# Patient Record
Sex: MALE | Race: WHITE | Employment: OTHER | URBAN - METROPOLITAN AREA
[De-identification: names, ages, dates, MRNs, and addresses within clinical notes are randomized per-mention and may not be internally consistent; named-entity substitution may affect disease eponyms.]

---

## 2017-03-30 ENCOUNTER — OFFICE VISIT (OUTPATIENT)
Dept: SLEEP MEDICINE | Age: 67
End: 2017-03-30

## 2017-03-30 VITALS
SYSTOLIC BLOOD PRESSURE: 147 MMHG | HEIGHT: 75 IN | WEIGHT: 221 LBS | BODY MASS INDEX: 27.48 KG/M2 | OXYGEN SATURATION: 97 % | HEART RATE: 56 BPM | DIASTOLIC BLOOD PRESSURE: 74 MMHG

## 2017-03-30 DIAGNOSIS — G47.33 OSA (OBSTRUCTIVE SLEEP APNEA): Primary | ICD-10-CM

## 2017-03-30 NOTE — PROGRESS NOTES
*Coby Guido is an 75 y.o. male who has been diagnosed as having obstructive sleep apnea. Initial Apnea/Hypopnea index was 13 which indicates mild apnea. He opted to have the disorder treated with an oral appliance. He was referred to Dr. Lenora Proctor for a mandibular advancement device which is stated as being used 100 percent of the time. He has failed CPAP and Bi-Level PAP in the past.    Polysomnogram was performed and the results of the study were explained to the patient. Please refer to interpretation report for further details. Apnea/Hypopnea index of 9 which indicates mild apnea. He has tried the slumber bump but does not use is as often, he has been advised to sleep on his back by his chiropractor. Recent oximetry indicative of desaturation >4 at 13 per hour. He has been advised by Dr. Justine Zamudio to get his deviated septum fixed. He denies severe snoring, snorting, periods of not breathing, tossing and turning or excessive daytime sleepiness. Coby Guido does not wake up frequently at night. He does not work shifts: Candance Huger Isidro Penna indicates he does not get too little sleep at night. Sleep quality has improved. Sleep duration has increased as has level of alertness during the day. [place] Sleepiness Score: 4 which reflects. Patient's usual sleep schedule is as follows:     Bedtime: 10 pm  Sleep Onset: 15-20 minutes  Frequency of nocturnal awakenings: 1-2 per night;   Duration: <5 minutes  Rise time: 6-7 am  Nappin    Sleep Review of Systems: notable for no difficulty falling asleep; infrequent awakenings at night;  regular dreaming noted; no nightmares ; no early morning headaches; no memory problems; no concentration issues; patient denies of being drowsy while driving.       Objective:     Visit Vitals    /74    Pulse (!) 56    Ht 6' 3\" (1.905 m)    Wt 221 lb (100.2 kg)    SpO2 97%    BMI 27.62 kg/m2         General:   Not in acute distress   Eyes: Anicteric sclerae, no obvious strabismus   Nose:  No Nasal septum deviation    Oropharynx:   Class 4 oropharyngeal outlet, thick tongue base, enlarged and boggy uvula, low-lying soft palate, narrow tonsilo-pharyngeal pilars   Tonsils:   tonsils are absent   Neck:    ; midline trachea   Chest/Lungs:  Equal lung expansion, clear on auscultation    CVS:  Normal rate, regular rhythm; no JVD   Skin:  Warm to touch; no obvious rashes   Neuro:  No focal deficits ; no obvious tremor    Psych:  Normal affect,  normal countenance;          Assessment:       ICD-10-CM ICD-9-CM    1. JOAN (obstructive sleep apnea) G47.33 327.23 SLEEP LAB (PAP TITRATION)         Plan:     . * He was provided information on sleep apnea including coresponding risk factors and the importance of proper treatment. * Treatment options if indicated were reviewed today. Patient agrees to a trial of PAP therapy if indicated. * Counseling was provided regarding proper sleep hygiene (including effect of light on sleep), paradoxical intention, stimulus control, sleep environment safety and safe driving. * Effect of sleep disturbance on weight was reviewed. We have recommended a dedicated weight loss through appropriate diet and an exercise regiment as significant weight reduction has been shown to reduce severity of obstructive sleep apnea. * Patient was asked to contact our office at any time for further questions regarding their sleep symptoms. Office visit exceeded 25 minutes with counseling and direction of care taking up more than 50% of the allotted time. Thank you for allowing to participate in your patient's medical care.

## 2018-04-13 ENCOUNTER — OFFICE VISIT (OUTPATIENT)
Dept: SLEEP MEDICINE | Age: 68
End: 2018-04-13

## 2018-04-13 VITALS
HEIGHT: 75 IN | HEART RATE: 57 BPM | BODY MASS INDEX: 26.36 KG/M2 | WEIGHT: 212 LBS | OXYGEN SATURATION: 97 % | SYSTOLIC BLOOD PRESSURE: 125 MMHG | DIASTOLIC BLOOD PRESSURE: 68 MMHG

## 2018-04-13 DIAGNOSIS — F39 MOOD DISORDER (HCC): ICD-10-CM

## 2018-04-13 DIAGNOSIS — E07.9 THYROID DISORDER: ICD-10-CM

## 2018-04-13 DIAGNOSIS — G47.33 OSA (OBSTRUCTIVE SLEEP APNEA): Primary | ICD-10-CM

## 2018-04-13 NOTE — PATIENT INSTRUCTIONS
[address] [place]. [place], [address]  Tel.  [phone]  Fax. [address] [facility] [address]  [place], [address] [facility]  Tel.  [phone]  Fax. [phone] [address] [facility] [address]  Tel.  [phone]  Fax. [phone]     Sleep Apnea: After Your Visit  Your Care Instructions  Sleep apnea occurs when you frequently stop breathing for 10 seconds or longer during sleep. It can be mild to severe, based on the number of times per hour that you stop breathing or have slowed breathing. Blocked or narrowed airways in your nose, mouth, or throat can cause sleep apnea. Your airway can become blocked when your throat muscles and tongue relax during sleep. Sleep apnea is common, occurring in 1 out of 20 individuals. Individuals having any of the following characteristics should be evaluated and treated right away due to high risk and detrimental consequences from untreated sleep apnea:  1. Obesity  2. Congestive Heart failure  3. Atrial Fibrillation  4. Uncontrolled Hypertension  5. Type II Diabetes  6. Night-time Arrhythmias  7. Stroke  8. Pulmonary Hypertension  9. High-risk Driving Populations (pilots, truck drivers, etc.)  10. Patients Considering Weight-loss Surgery    How do you know you have sleep apnea? You probably have sleep apnea if you answer 'yes' to 3 or more of the following questions:  S - Have you been told that you Snore? T - Are you often Tired during the day? O - Has anyone Observed you stop breathing while sleeping? P- Do you have (or are being treated for) high blood Pressure? B - Are you obese (Body Mass Index > 35)? A - Is your Age 48years old or older? N - Is your Neck size greater than 16 inches? G - Are you male Gender? A sleep physician can prescribe a breathing device that prevents tissues in the throat from blocking your airway.  Or your doctor may recommend using a dental device (oral breathing device) to help keep your airway open. In some cases, surgery may be needed to remove enlarged tissues in the throat. Follow-up care is a key part of your treatment and safety. Be sure to make and go to all appointments, and call your doctor if you are having problems. It's also a good idea to know your test results and keep a list of the medicines you take. How can you care for yourself at home? · Lose weight, if needed. It may reduce the number of times you stop breathing or have slowed breathing. · Go to bed at the same time every night. · Sleep on your side. It may stop mild apnea. If you tend to roll onto your back, sew a pocket in the back of your pajama top. Put a tennis ball into the pocket, and stitch the pocket shut. This will help keep you from sleeping on your back. · Avoid alcohol and medicines such as sleeping pills and sedatives before bed. · Do not smoke. Smoking can make sleep apnea worse. If you need help quitting, talk to your doctor about stop-smoking programs and medicines. These can increase your chances of quitting for good. · Prop up the head of your bed 4 to 6 inches by putting bricks under the legs of the bed. · Treat breathing problems, such as a stuffy nose, caused by a cold or allergies. · Use a continuous positive airway pressure (CPAP) breathing machine if lifestyle changes do not help your apnea and your doctor recommends it. The machine keeps your airway from closing when you sleep. · If CPAP does not help you, ask your doctor whether you should try other breathing machines. A bilevel positive airway pressure machine has two types of air pressureâone for breathing in and one for breathing out. Another device raises or lowers air pressure as needed while you breathe. · If your nose feels dry or bleeds when using one of these machines, talk with your doctor about increasing moisture in the air. A humidifier may help.   · If your nose is runny or stuffy from using a breathing machine, talk with your doctor about using decongestants or a corticosteroid nasal spray. When should you call for help? Watch closely for changes in your health, and be sure to contact your doctor if:  · You still have sleep apnea even though you have made lifestyle changes. · You are thinking of trying a device such as CPAP. · You are having problems using a CPAP or similar machine. Where can you learn more? Go to clickTRUE. Enter K717 in the search box to learn more about \"Sleep Apnea: After Your Visit. \"   © 2511-4286 Healthwise, Incorporated. Care instructions adapted under license by [employer] (which disclaims liability or warranty for this information). This care instruction is for use with your licensed healthcare professional. If you have questions about a medical condition or this instruction, always ask your healthcare professional. [facility] any warranty or liability for your use of this information. PROPER SLEEP HYGIENE    What to avoid  · Do not have drinks with caffeine, such as coffee or black tea, for 8 hours before bed. · Do not smoke or use other types of tobacco near bedtime. Nicotine is a stimulant and can keep you awake. · Avoid drinking alcohol late in the evening, because it can cause you to wake in the middle of the night. · Do not eat a big meal close to bedtime. If you are hungry, eat a light snack. · Do not drink a lot of water close to bedtime, because the need to urinate may wake you up during the night. · Do not read or watch TV in bed. Use the bed only for sleeping and sexual activity. What to try  · Go to bed at the same time every night, and wake up at the same time every morning. Do not take naps during the day. · Keep your bedroom quiet, dark, and cool. · Get regular exercise, but not within 3 to 4 hours of your bedtime. .  · Sleep on a comfortable pillow and mattress.   · If watching the clock makes you anxious, turn it facing away from you so you cannot see the time. · If you worry when you lie down, start a worry book. Well before bedtime, write down your worries, and then set the book and your concerns aside. · Try meditation or other relaxation techniques before you go to bed. · If you cannot fall asleep, get up and go to another room until you feel sleepy. Do something relaxing. Repeat your bedtime routine before you go to bed again. · Make your house quiet and calm about an hour before bedtime. Turn down the lights, turn off the TV, log off the computer, and turn down the volume on music. This can help you relax after a busy day. Drowsy Driving  The [address] [place] [address] Road Traffic Safety Administration cites drowsiness as a causing factor in more than 969,858 police reported crashes annually, resulting in 76,000 injuries and 1,500 deaths. Other surveys suggest 55% of people polled have driven while drowsy in the past year, 23% had fallen asleep but not crashed, 3% crashed, and 2% had and accident due to drowsy driving. Who is at risk? Young Drivers: One study of drowsy driving accidents states that 55% of the drivers were under 25 years. Of those, 75% were male. Shift Workers and Travelers: People who work overnight or travel across time zones frequently are at higher risk of experiencing Circadian Rhythm Disorders. They are trying to work and function when their body is programed to sleep. Sleep Deprived: Lack of sleep has a serious impact on your ability to pay attention or focus on a task. Consistently getting less than the average of 8 hours your body needs creates partial or cumulative sleep deprivation. Untreated Sleep Disorders: Sleep Apnea, Narcolepsy, R.L.S., and other sleep disorders (untreated) prevent a person from getting enough restful sleep. This leads to excessive daytime sleepiness and increases the risk for drowsy driving accidents by up to 7 times.   Medications / Alcohol: Even over the counter medications can cause drowsiness. Medications that impair a drivers attention should have a warning label. Alcohol naturally makes you sleepy and on its own can cause accidents. Combined with excessive drowsiness its effects are amplified. Signs of Drowsy Driving:   * You don't remember driving the last few miles   * You may drift out of your graham   * You are unable to focus and your thoughts wander   * You may yawn more often than normal   * You have difficulty keeping your eyes open / nodding off   * Missing traffic signs, speeding, or tailgating  Prevention-   Good sleep hygiene, lifestyle and behavioral choices have the most impact on drowsy driving. There is no substitute for sleep and the average person requires 8 hours nightly. If you find yourself driving drowsy, stop and sleep. Consider the sleep hygiene tips provided during your visit as well. Medication Refill Policy: Refills for all medications require 1 week advance notice. Please have your pharmacy fax a refill request. We are unable to fax, or call in \"controled substance\" medications and you will need to pick these prescriptions up from our office. PERORA Activation    Thank you for requesting access to PERORA. Please follow the instructions below to securely access and download your online medical record. PERORA allows you to send messages to your doctor, view your test results, renew your prescriptions, schedule appointments, and more. How Do I Sign Up? 1. In your internet browser, go to [email or web address]. 2. Click on the First Time User? Click Here link in the Sign In box. You will see the New Member Sign Up page. 3. Enter your PERORA Access Code exactly as it appears below. You will not need to use this code after youve completed the sign-up process. If you do not sign up before the expiration date, you must request a new code. PERORA Access Code:  Activation code not generated  Current PERORA Status: Active (This is the date your Quotefish access code will )    4. Enter the last four digits of your Social Security Number (xxxx) and Date of Birth (mm/dd/yyyy) as indicated and click Submit. You will be taken to the next sign-up page. 5. Create a Quotefish ID. This will be your Quotefish login ID and cannot be changed, so think of one that is secure and easy to remember. 6. Create a Quotefish password. You can change your password at any time. 7. Enter your Password Reset Question and Answer. This can be used at a later time if you forget your password. 8. Enter your e-mail address. You will receive e-mail notification when new information is available in [address] 9. Click Sign Up. You can now view and download portions of your medical record. 10. Click the Download Summary menu link to download a portable copy of your medical information. Additional Information    If you have questions, please call [phone]. Remember, Quotefish is NOT to be used for urgent needs. For medical emergencies, dial 911.

## 2018-04-13 NOTE — PROGRESS NOTES
Carter Alvarez is an 75 y.o. male who has been diagnosed as having obstructive sleep apnea. Initial Apnea/Hypopnea index was 13 which indicates mild apnea. He opted to have the disorder treated with an oral appliance. He was referred to Dr. Alonzo Landau for a mandibular advancement device which is stated as being used 100 percent of the time. He has failed CPAP and Bi-Level PAP in the past.     Polysomnogram was performed again with oral appliance per patient. Please refer to interpretation report for further details. Apnea/Hypopnea index of 9 which indicates mild apnea. He has tried the slumber bump but does not use is as often, he has been advised to sleep on his back by his chiropractor. Recent oximetry () indicative of desaturation >4 at 13 per hour. He was been advised to see Dr. Che Banda to get his deviated septum fixed but opted not to have the surgery.     He sleeps by himself and denies waking up snoring, snorting, periods of not breathing, tossing and turning or excessive daytime sleepiness. Carter Alvarez does not wake up frequently at night. He does not work shifts: Babak Cui indicates he does not get too little sleep at night. Sleep quality has improved. Sleep duration has increased as has level of alertness during the day. He denies snoring, snorting, choking, periods of not breathing, tossing and turning, excessive daytime sleepiness. Carter Alvarez does not wake up frequently at night. He does not work shifts: Babak Cui indicates he does not get too little sleep at night. Sleep quality has improved. Sleep duration has increased as has level of alertness during the day.       [place] Sleepiness Score: 3    Patient's usual sleep schedule is as follows:     Bedtime: ** pm  Sleep Onset: 15 minutes  Frequency of nocturnal awakenings: 0-1 per night;   Duration: 5 minutes  Rise time: 7 am  Napping: after lunch    Sleep Review of Systems: notable for no difficulty falling asleep; infrequent awakenings at night;  regular dreaming noted; no nightmares ; no early morning headaches; no memory problems; no concentration issues; patient denies of being drowsy while driving. Objective:     Visit Vitals    /68 (BP 1 Location: Left arm, BP Patient Position: Sitting)    Pulse (!) 57    Ht 6' 3\" (1.905 m)    Wt 212 lb (96.2 kg)    SpO2 97%    BMI 26.5 kg/m2         General:   Not in acute distress   Eyes:  Anicteric sclerae, no obvious strabismus   Nose:  No Nasal septum deviation    Oropharynx:   Class 4 oropharyngeal outlet, thick tongue base, enlarged and boggy uvula, low-lying soft palate, narrow tonsilo-pharyngeal pilars   Tonsils:   tonsils are absent   Neck:    midline trachea   Chest/Lungs:  Equal lung expansion, clear on auscultation    CVS:  Normal rate, regular rhythm; no JVD   Skin:  Warm to touch; no obvious rashes   Neuro:  No focal deficits ; no obvious tremor    Psych:  Normal affect,  normal countenance;          Assessment:       ICD-10-CM ICD-9-CM    1. JOAN (obstructive sleep apnea) G47.33 327.23    2. Mood disorder (HCC) F39 296.90    3. BMI 26.0-26.9,adult Z68.26 V85.22    4. Thyroid disorder E07.9 246.9          Plan:       * Home sleep testing was ordered today to objectively assess for sleep disordered breathing on current therapy. * It was explained to the patient that JOAN is an occult disorder it would be important to retest due to presence of residual apnea / hypoxemia on previous testing and to consider re-titration of oral appliance if apnea is present. * Counseling was provided regarding safe driving and proper sleep hygiene. * Counseling was provided regarding the importance of regular therapy and follow-up with dentist as per their recommendation and with us in 1 year or as needed. * Patient was asked to contact our office at any time for further questions regarding their sleep symptoms.     Office visit exceeded 25 minutes with counseling and direction of care taking up more than 50% of the allotted time. Thank you for allowing to participate in your patient's medical care. Frederick Palencia MD, FAASM  Electronically signed.  04/13/18

## 2018-05-22 ENCOUNTER — HOSPITAL ENCOUNTER (OUTPATIENT)
Age: 68
Setting detail: OUTPATIENT SURGERY
Discharge: HOME | End: 2018-05-22
Attending: INTERNAL MEDICINE | Admitting: INTERNAL MEDICINE
Payer: MEDICARE

## 2018-05-22 ENCOUNTER — ANESTHESIA (OUTPATIENT)
Dept: ENDOSCOPY | Age: 68
End: 2018-05-22
Payer: MEDICARE

## 2018-05-22 ENCOUNTER — ANESTHESIA EVENT (OUTPATIENT)
Dept: ENDOSCOPY | Age: 68
End: 2018-05-22
Payer: MEDICARE

## 2018-05-22 VITALS
OXYGEN SATURATION: 100 % | BODY MASS INDEX: 25.12 KG/M2 | HEIGHT: 75 IN | WEIGHT: 202 LBS | TEMPERATURE: 97.8 F | RESPIRATION RATE: 18 BRPM | DIASTOLIC BLOOD PRESSURE: 66 MMHG | SYSTOLIC BLOOD PRESSURE: 130 MMHG | HEART RATE: 48 BPM

## 2018-05-22 PROCEDURE — 77030027957 HC TBNG IRR ENDOGTR BUSS -B: Performed by: INTERNAL MEDICINE

## 2018-05-22 PROCEDURE — 74011250636 HC RX REV CODE- 250/636

## 2018-05-22 PROCEDURE — 77030013992 HC SNR POLYP ENDOSC BSC -B: Performed by: INTERNAL MEDICINE

## 2018-05-22 PROCEDURE — 76060000031 HC ANESTHESIA FIRST 0.5 HR: Performed by: INTERNAL MEDICINE

## 2018-05-22 PROCEDURE — 76040000019: Performed by: INTERNAL MEDICINE

## 2018-05-22 PROCEDURE — 77030009426 HC FCPS BIOP ENDOSC BSC -B: Performed by: INTERNAL MEDICINE

## 2018-05-22 PROCEDURE — 88304 TISSUE EXAM BY PATHOLOGIST: CPT | Performed by: INTERNAL MEDICINE

## 2018-05-22 RX ORDER — ATROPINE SULFATE 0.1 MG/ML
0.5 INJECTION INTRAVENOUS
Status: DISCONTINUED | OUTPATIENT
Start: 2018-05-22 | End: 2018-05-22 | Stop reason: HOSPADM

## 2018-05-22 RX ORDER — DEXTROMETHORPHAN/PSEUDOEPHED 2.5-7.5/.8
1.2 DROPS ORAL
Status: DISCONTINUED | OUTPATIENT
Start: 2018-05-22 | End: 2018-05-22 | Stop reason: HOSPADM

## 2018-05-22 RX ORDER — SODIUM CHLORIDE 0.9 % (FLUSH) 0.9 %
5-10 SYRINGE (ML) INJECTION AS NEEDED
Status: DISCONTINUED | OUTPATIENT
Start: 2018-05-22 | End: 2018-05-22 | Stop reason: HOSPADM

## 2018-05-22 RX ORDER — EPINEPHRINE 0.1 MG/ML
1 INJECTION INTRACARDIAC; INTRAVENOUS
Status: DISCONTINUED | OUTPATIENT
Start: 2018-05-22 | End: 2018-05-22 | Stop reason: HOSPADM

## 2018-05-22 RX ORDER — SODIUM CHLORIDE 9 MG/ML
150 INJECTION, SOLUTION INTRAVENOUS CONTINUOUS
Status: DISCONTINUED | OUTPATIENT
Start: 2018-05-22 | End: 2018-05-22 | Stop reason: HOSPADM

## 2018-05-22 RX ORDER — PROPOFOL 10 MG/ML
INJECTION, EMULSION INTRAVENOUS AS NEEDED
Status: DISCONTINUED | OUTPATIENT
Start: 2018-05-22 | End: 2018-05-22 | Stop reason: HOSPADM

## 2018-05-22 RX ORDER — SODIUM CHLORIDE 9 MG/ML
INJECTION, SOLUTION INTRAVENOUS
Status: DISCONTINUED | OUTPATIENT
Start: 2018-05-22 | End: 2018-05-22 | Stop reason: HOSPADM

## 2018-05-22 RX ORDER — MIDAZOLAM HYDROCHLORIDE 1 MG/ML
.25-5 INJECTION, SOLUTION INTRAMUSCULAR; INTRAVENOUS
Status: DISCONTINUED | OUTPATIENT
Start: 2018-05-22 | End: 2018-05-22 | Stop reason: HOSPADM

## 2018-05-22 RX ORDER — SODIUM CHLORIDE 0.9 % (FLUSH) 0.9 %
5-10 SYRINGE (ML) INJECTION EVERY 8 HOURS
Status: DISCONTINUED | OUTPATIENT
Start: 2018-05-22 | End: 2018-05-22 | Stop reason: HOSPADM

## 2018-05-22 RX ADMIN — PROPOFOL 50 MG: 10 INJECTION, EMULSION INTRAVENOUS at 11:03

## 2018-05-22 RX ADMIN — PROPOFOL 30 MG: 10 INJECTION, EMULSION INTRAVENOUS at 10:50

## 2018-05-22 RX ADMIN — SODIUM CHLORIDE: 9 INJECTION, SOLUTION INTRAVENOUS at 10:34

## 2018-05-22 RX ADMIN — PROPOFOL 30 MG: 10 INJECTION, EMULSION INTRAVENOUS at 10:51

## 2018-05-22 RX ADMIN — PROPOFOL 50 MG: 10 INJECTION, EMULSION INTRAVENOUS at 10:55

## 2018-05-22 RX ADMIN — PROPOFOL 50 MG: 10 INJECTION, EMULSION INTRAVENOUS at 11:00

## 2018-05-22 RX ADMIN — PROPOFOL 50 MG: 10 INJECTION, EMULSION INTRAVENOUS at 11:10

## 2018-05-22 RX ADMIN — PROPOFOL 50 MG: 10 INJECTION, EMULSION INTRAVENOUS at 11:08

## 2018-05-22 NOTE — PROCEDURES
Anne Martinez 91Christian Mckinney M.D.  [address] [facility], [address] [place] [address]  [phone]               Colonoscopy Procedure Note    NAME: Rebeka Willett  :  1950  MRN:  [id number]    Indications:   Personal history of colon polyps (screening only)     : Marianne Fields MD    Referring Provider:  Lonny Sullivan MD    Medicines:  MAC anesthesia      Procedure Details:  After informed consent was obtained with all risks and benefits of the procedure explained and preprocedure exam completed, the patient was placed in the left lateral decubitus position. Universal protocol for patient identification was performed and documented in the nursing notes. Throughout the procedure, the patient's blood pressure was monitored at least every five minutes; pulse, and oxygen saturations were monitored continuously. All vital signs were documented in the nursing notes. A digital rectal exam was performed and was normal.  The Olympus videocolonoscope  was inserted in the rectum and carefully advanced to the cecum, which was identified by the ileocecal valve and appendiceal orifice. The colonoscope was slowly withdrawn with careful evaluation between folds. Retroflexion in the rectum was performed; findings and interventions are described below. Procedure start time, extent reached time/cecum time, and procedure end time are documented in the nursing notes. The quality of preparation was good.        Findings:   Rectum: normal  Sigmoid:     - Diverticulosis  Descending Colon: 2  Sessile polyp(s), the largest 5 mm in size; s/p cold forceps polypectomy  Transverse Colon: 1  Sessile polyp(s), the largest 7 mm in size; s/p cold snare polypectomy  Ascending Colon: 1  Sessile polyp(s), the largest 3 mm in size; s/p cold forceps polypectomy  Cecum: 1  Sessile polyp(s), the largest 4 mm in size; s/p cold snare polypectomy    Interventions:    5 complete polypectomy were performed using cold snare /forceps and the polyps were  retrieved    Specimens:     ID Type Source Tests Collected by Time Destination   1 : Cecum Polyp- Cold Snare Technique Preservative   Sky Hebert MD 5/22/2018 1053 Pathology   2 : Ascending Colon Polyp- Cold Forceps Technique Preservative   Sky Hebert MD 5/22/2018 1055 Pathology   3 : Transverse Colon Polyp- Cold Snare Technique Preservative   Sky Hebert MD 5/22/2018 1057 Pathology   4 : Descending Colon Polyps- Biopsy Forceps Preservative   Sky Hebert MD 5/22/2018 1058 Pathology       EBL:  None. Complications:   No immediate complications     Impression:  -See post-procedure diagnoses. Recommendations:   -Repeat colonoscopy in 3 years. If < 10 years, reason:  above average risk patient    Resume normal medication(s). Signed by:  Sky Hebert MD          5/22/2018  11:09 AM

## 2018-05-22 NOTE — IP AVS SNAPSHOT
1111 [facility] [address] [place] [address] 
[phone] Patient: Roseline Nunez MRN: [id number] NOV:[id number] About your hospitalization You were admitted on:  May 22, 2018 You last received care in the:  [facility] ENDOSCOPY You were discharged on:  May 22, 2018 Why you were hospitalized Your primary diagnosis was:  Not on File Follow-up Information None Discharge Orders None A check yasmin indicates which time of day the medication should be taken. My Medications CONTINUE taking these medications Instructions Each Dose to Equal  
 Morning Noon Evening Bedtime  
 acetaminophen 500 mg tablet Commonly known as:  TYLENOL Your last dose was: Your next dose is: Take  by mouth every six (6) hours as needed for Pain. aspirin delayed-release 81 mg tablet Your last dose was: Your next dose is: Take 81 mg by mouth daily. 81 mg Cholecalciferol (Vitamin D3) 2,000 unit Cap capsule Commonly known as:  VITAMIN D3 Your last dose was: Your next dose is: Take 2,000 Units by mouth daily. 2000 Units  
    
   
   
   
  
 eszopiclone 1 mg tablet Commonly known as:  Luis Enrique Bless Your last dose was: Your next dose is: Take  by mouth nightly as needed for Sleep. FISH OIL 1,000 mg Cap Generic drug:  omega-3 fatty acids-vitamin e Your last dose was: Your next dose is: Take  by mouth. 1200mg twice daily. fluocinolone acetonide oil 0.01 % Drop Your last dose was: Your next dose is:    
   
   
 by Otic route. hydrocortisone 2.5 % topical cream  
Commonly known as:  HYTONE Your last dose was: Your next dose is: Apply  to affected area two (2) times a day. use thin layer  
     
   
   
   
  
 indomethacin 25 mg capsule Commonly known as:  INDOCIN Your last dose was: Your next dose is:    
   
   
 1-2 pills every 6 hours as needed for gout , limit 6 per day . LaMICtal 150 mg tablet Generic drug:  lamoTRIgine Your last dose was: Your next dose is: Take 150 mg by mouth two (2) times a day. 150 mg  
    
   
   
   
  
 levothyroxine 150 mcg tablet Commonly known as:  SYNTHROID Your last dose was: Your next dose is: TAKE 1 TABLET DAILY  
     
   
   
   
  
 lithium carbonate  mg CR tablet Commonly known as:  ESKALITH CR Your last dose was: Your next dose is: Take 450 mg by mouth two (2) times a day. 450 mg  
    
   
   
   
  
 * OTHER Your last dose was: Your next dose is:    
   
   
 Daytime liquid and night time liquid prn runny/stuffy nose * OTHER Your last dose was: Your next dose is: Alderil? One qhs prn  
     
   
   
   
  
 oxymetazoline 0.05 % Mist  
   
Your last dose was: Your next dose is:    
   
   
 1 spray by Both Nostrils route as needed. 1 Spray  
    
   
   
   
  
 raNITIdine 75 mg tablet Commonly known as:  ZANTAC Your last dose was: Your next dose is: Take 75 mg by mouth as needed. 75 mg SEROquel 100 mg tablet Generic drug:  QUEtiapine Your last dose was: Your next dose is: Take 200 mg by mouth nightly. 200 mg  
    
   
   
   
  
 sildenafil (antihypertensive) 20 mg tablet Commonly known as:  REVATIO Your last dose was: Your next dose is:    
   
   
 1 to 3 pills , 1 hour before relations . Use instead of Cialis  
     
   
   
   
  
 simvastatin 10 mg tablet Commonly known as:  ZOCOR Your last dose was: Your next dose is: Take 1 Tab by mouth nightly. 10 mg  
    
   
   
   
  
 * Notice: This list has 2 medication(s) that are the same as other medications prescribed for you. Read the directions carefully, and ask your doctor or other care provider to review them with you. Discharge Instructions [address]  Danyel Ramos. Kristopher Bernal M.D. 
1 [facility], [address] [place] [address] 
[phone] COLONOSCOPY DISCHARGE INSTRUCTIONS [facility] 
[id number] 
1950 DISCOMFORT: 
Redness at IV site- apply warm compress to area; if redness or soreness persist- contact your physician There may be a slight amount of blood passed from the rectum Gaseous discomfort- walking, belching will help relieve any discomfort You may not operate a vehicle for 12 hours You may not engage in an occupation involving machinery or appliances for the  rest of today You may not drink alcoholic beverages for at least 12 hours Avoid making any critical decisions for at least 24 hours DIET: 
 You may resume your normal diet, but some patients find that heavy or large  meals may lead to indigestion or vomiting. I suggest a light meal as first food  intake. I recommend a whole food, plant-based diet for your overall health. ACTIVITY: 
You may resume your normal daily activities. It is recommended that you spend the remainder of the day resting - avoid any strenuous activity. CALL DAPHNE Concepcion ANY SIGN OF: Increasing pain, nausea, vomiting Abdominal distension (swelling) Significant bleeding (oral or rectal) Fever Pain in chest area Shortness of breath Additional Instructions: 
 Call Dr. Kristopher Bernal if any questions or problems at [phone] You should receive the biopsy results by phone or mail within 3 weeks, if not, call  my office for the results Should have a repeat colonoscopy in 3 years. Colonoscopy showed 5 small polyps removed, diverticulosis. Introducing [facility]! Dear Conor Altamirano: 
Thank you for requesting a [employer] account. Our records indicate that you already have an active [employer] account. You can access your account anytime at [email or web address] Did you know that you can access your hospital and ER discharge instructions at any time in [employer]? You can also review all of your test results from your hospital stay or ER visit. Additional Information If you have questions, please visit the Frequently Asked Questions section of the [employer] website at [email or web address] Remember, [employer] is NOT to be used for urgent needs. For medical emergencies, dial 911. Now available from your iPhone and Android! Introducing Agustin Clement As a Ivelisse Joyce patient, I wanted to make you aware of our electronic visit tool called Agustin Johnnykenziemarguerite. Ivelisse Joyce 24/7 allows you to connect within minutes with a medical provider 24 hours a day, seven days a week via a mobile device or tablet or logging into a secure website from your computer. You can access Agustin Racquel from anywhere in the [place]. A virtual visit might be right for you when you have a simple condition and feel like you just dont want to get out of bed, or cant get away from work for an appointment, when your regular Humecarmina Joyce provider is not available (evenings, weekends or holidays), or when youre out of town and need minor care. Electronic visits cost only $49 and if the Hume Joyce 24/7 provider determines a prescription is needed to treat your condition, one can be electronically transmitted to a nearby pharmacy*. Please take a moment to enroll today if you have not already done so. The enrollment process is free and takes just a few minutes.   To enroll, please download the [employer] 24/7 jairo to your tablet or phone, or visit [email or web address] to enroll on your computer. And, as an [address] [place] [address] patient with a [employer] account, the results of your visits will be scanned into your electronic medical record and your primary care provider will be able to view the scanned results. We urge you to continue to see your regular Ari Mauricio provider for your ongoing medical care. And while your primary care provider may not be the one available when you seek a g4interactive virtual visit, the peace of mind you get from getting a real diagnosis real time can be priceless. For more information on g4interactive, view our Frequently Asked Questions (FAQs) at [email or web address]. Sincerely, 
 
Ranjana Bonilla MD 
Chief Medical Officer [address] *:  certain medications cannot be prescribed via [employer]interactive Providers Seen During Your Hospitalization Provider Specialty Primary office phone Callsanjana Contreras MD Gastroenterology [phone] Your Primary Care Physician (PCP) Primary Care Physician Office Phone Office Fax S-Kirill [address] [place] [address] [phone] You are allergic to the following No active allergies Recent Documentation Height Weight BMI Smoking Status 1.905 m 91.6 kg 25.25 kg/m2 Former Smoker Emergency Contacts Name Discharge Info Relation Home Work Mobile LettyrachelMary KateStephanie DISCHARGE CAREGIVER [3] Spouse [3] [phone] Patient Belongings The following personal items are in your possession at time of discharge: 
  Dental Appliances: None  Visual Aid: Glasses Please provide this summary of care documentation to your next provider. Signatures-by signing, you are acknowledging that this After Visit Summary has been reviewed with you and you have received a copy. Patient Signature:  ____________________________________________________________ Date:  ____________________________________________________________  
  
Fayrene Retort Provider Signature:  ____________________________________________________________ Date:  ____________________________________________________________

## 2018-05-22 NOTE — PROGRESS NOTES

## 2018-05-22 NOTE — ANESTHESIA PREPROCEDURE EVALUATION
Anesthetic History   No history of anesthetic complications            Review of Systems / Medical History  Patient summary reviewed, nursing notes reviewed and pertinent labs reviewed    Pulmonary        Sleep apnea           Neuro/Psych         Psychiatric history     Cardiovascular  Within defined limits                     GI/Hepatic/Renal     GERD           Endo/Other      Hypothyroidism  Arthritis     Other Findings              Physical Exam    Airway  Mallampati: II  TM Distance: > 6 cm  Neck ROM: normal range of motion   Mouth opening: Normal     Cardiovascular  Regular rate and rhythm,  S1 and S2 normal,  no murmur, click, rub, or gallop             Dental  No notable dental hx       Pulmonary  Breath sounds clear to auscultation               Abdominal  GI exam deferred       Other Findings            Anesthetic Plan    ASA: 3  Anesthesia type: MAC          Induction: Intravenous  Anesthetic plan and risks discussed with: Patient

## 2018-05-22 NOTE — ANESTHESIA POSTPROCEDURE EVALUATION
Post-Anesthesia Evaluation and Assessment    Patient: Joan Postal MRN: [id number]  SSN: [id number]    YOB: 1950  Age: 70 y.o. Sex: male       Cardiovascular Function/Vital Signs  Visit Vitals    BP 98/77    Pulse 82    Resp 14    Ht 6' 3\" (1.905 m)    Wt 91.6 kg (202 lb)    SpO2 99%    BMI 25.25 kg/m2       Patient is status post MAC anesthesia for Procedure(s):  COLONOSCOPY  ENDOSCOPIC POLYPECTOMY. Nausea/Vomiting: None    Postoperative hydration reviewed and adequate. Pain:  Pain Scale 1: Numeric (0 - 10) (05/22/18 1006)  Pain Intensity 1: 0 (05/22/18 1006)   Managed    Neurological Status: At baseline    Mental Status and Level of Consciousness: Arousable    Pulmonary Status:   O2 Device: Nasal cannula (05/22/18 1108)   Adequate oxygenation and airway patent    Complications related to anesthesia: None    Post-anesthesia assessment completed.  No concerns    Signed By: Katy Tavera MD     May 22, 2018

## 2018-05-22 NOTE — DISCHARGE INSTRUCTIONS
Anne Reyes 912 Fernando Wylie M.D.  [address] [place] [address], [address] [place] [address]  [phone]          COLONOSCOPY [address] [place] [address] [place]  [id number]  1950    DISCOMFORT:  Redness at IV site- apply warm compress to area; if redness or soreness persist- contact your physician  There may be a slight amount of blood passed from the rectum  Gaseous discomfort- walking, belching will help relieve any discomfort  You may not operate a vehicle for 12 hours  You may not engage in an occupation involving machinery or appliances for the  rest of today  You may not drink alcoholic beverages for at least 12 hours  Avoid making any critical decisions for at least 24 hours    DIET:   You may resume your normal diet, but some patients find that heavy or large  meals may lead to indigestion or vomiting. I suggest a light meal as first food  intake. I recommend a whole food, plant-based diet for your overall health. ACTIVITY:  You may resume your normal daily activities. It is recommended that you spend the remainder of the day resting - avoid any strenuous activity. CALL M.D. IF ANY SIGN OF:   Increasing pain, nausea, vomiting  Abdominal distension (swelling)  Significant bleeding (oral or rectal)  Fever   Pain in chest area  Shortness of breath    Additional Instructions:   Call Dr. Fernando Wylie if any questions or problems at [phone]   You should receive the biopsy results by phone or mail within 3 weeks, if not, call  my office for the results      Should have a repeat colonoscopy in 3 years. Colonoscopy showed 5 small polyps removed, diverticulosis.

## 2018-05-22 NOTE — H&P
[address], [address] [place] [address]          Pre-procedure History and Physical       NAME:  Romelia Ellison   :   1950   MRN:   [id number]     CHIEF COMPLAINT/HPI: See procedure note    PMH:  Past Medical History:   Diagnosis Date    Arthritis     Bipolar affective disorder ([address]     GERD (gastroesophageal reflux disease)     Gout     Hyperlipidemia 2011    Hypothyroidism     Inguinal hernia 2012    Psychiatric disorder     Bipolar Disorder    Rupture Achilles tendon     left    Sleep apnea 2011    does not use CPAP/uses dental appliance    Thyroid disease        PSH:  Past Surgical History:   Procedure Laterality Date    HX COLONOSCOPY  10/13/14    polypectomy, f/u 3 y . dr. Agnes Vargas HX ORTHOPAEDIC      tendon repair of thumb    HX OTHER SURGICAL      right inguinal hernia repair    HX OTHER SURGICAL      colonoscopy - hemorrhoids    HX TONSILLECTOMY         Allergies:  No Known Allergies    Home Medications:  Prior to Admission Medications   Prescriptions Last Dose Informant Patient Reported? Taking? Cholecalciferol, Vitamin D3, (VITAMIN D3) 2,000 unit cap capsule 2018 at Unknown time  No Yes   Sig: Take 2,000 Units by mouth daily. OTHER 5/15/2018 at Unknown time  Yes Yes   Sig: Daytime liquid and night time liquid prn runny/stuffy nose   OTHER Unknown at Unknown time  Yes No   Sig: Alderil? One qhs prn   acetaminophen (TYLENOL) 500 mg tablet 5/15/2018 at Unknown time  Yes Yes   Sig: Take  by mouth every six (6) hours as needed for Pain. aspirin delayed-release 81 mg tablet 5/15/2018 at Unknown time  Yes Yes   Sig: Take 81 mg by mouth daily. eszopiclone (LUNESTA) 1 mg tablet Unknown at Unknown time  Yes No   Sig: Take  by mouth nightly as needed for Sleep. fluocinolone acetonide oil 0.01 % drop 2018 at Unknown time  Yes Yes   Sig: by Otic route.    hydrocortisone (HYTONE) 2.5 % topical cream 2018 at Unknown time  Yes Yes   Sig: Apply  to affected area two (2) times a day. use thin layer   indomethacin (INDOCIN) 25 mg capsule Unknown at Unknown time  No No   Si-2 pills every 6 hours as needed for gout , limit 6 per day . lamotrigine (LAMICTAL) 150 mg tablet 2018 at Unknown time  Yes Yes   Sig: Take 150 mg by mouth two (2) times a day. levothyroxine (SYNTHROID) 150 mcg tablet 2018 at Unknown time  No Yes   Sig: TAKE 1 TABLET DAILY   lithium CR (ESKALITH CR) 450 mg CR tablet 2018 at Unknown time  Yes Yes   Sig: Take 450 mg by mouth two (2) times a day. omega-3 fatty acids-vitamin e (FISH OIL) 1,000 mg cap 2018 at Unknown time  Yes Yes   Sig: Take  by mouth. 1200mg twice daily. oxymetazoline 0.05 % mist Unknown at Unknown time  Yes No   Si spray by Both Nostrils route as needed. quetiapine (SEROQUEL) 100 mg tablet 2018 at Unknown time  Yes Yes   Sig: Take 200 mg by mouth nightly. ranitidine (ZANTAC) 75 mg tablet Unknown at Unknown time  Yes No   Sig: Take 75 mg by mouth as needed. sildenafil, antihypertensive, (REVATIO) 20 mg tablet 5/15/2018 at Unknown time  No Yes   Si to 3 pills , 1 hour before relations . Use instead of Cialis   simvastatin (ZOCOR) 10 mg tablet 2018 at Unknown time  No Yes   Sig: Take 1 Tab by mouth nightly.       Facility-Administered Medications: None       Hospital Medications:  Current Facility-Administered Medications   Medication Dose Route Frequency    0.9% sodium chloride infusion  150 mL/hr IntraVENous CONTINUOUS    sodium chloride (NS) flush 5-10 mL  5-10 mL IntraVENous Q8H    sodium chloride (NS) flush 5-10 mL  5-10 mL IntraVENous PRN    midazolam (VERSED) injection 0.25-5 mg  0.25-5 mg IntraVENous Multiple    simethicone (MYLICON) 07ZR/2.1SN oral drops 80 mg  1.2 mL Oral Multiple    atropine injection 0.5 mg  0.5 mg IntraVENous ONCE PRN    EPINEPHrine (ADRENALIN) 0.1 mg/mL syringe 1 mg  1 mg Endoscopically ONCE PRN Family History:  Family History   Problem Relation Age of Onset    Other Father      Snoring, insomnia    Suicide Father     Heart Attack Mother 79       Social History:  Social History   Substance Use Topics    Smoking status: Former Smoker    Smokeless tobacco: Never Used      Comment: quit smoking cigarettes/pipe 40 yrs ago    Alcohol use Yes      Comment:  1-2  drinks per week          PHYSICAL EXAM PRIOR TO SEDATION:  General: Alert, in no acute distress    Lungs:            CTA bilaterally  Heart:  Normal S1, S2    Abdomen: Soft, Non distended, Non tender. Normoactive bowel sounds. Assessment:   Stable for sedation administration.   Date of last colonoscopy: 3 yrs, Polyps  Yes    Plan:   · Endoscopic procedure with sedation     Signed By: Ken Forbes MD     5/22/2018  10:46 AM

## 2018-05-22 NOTE — ROUTINE PROCESS
Romelia Ellison  1950  [id number]    Situation:  Verbal report received from: Ganesh Obregon RN  Procedure: Procedure(s):  COLONOSCOPY  ENDOSCOPIC POLYPECTOMY    Background:    Preoperative diagnosis: COLON POLYPS  Postoperative diagnosis: 1.- Diverticulosis  2.- Colonic Polyps    :  Dr. Tash Malik  Assistant(s): Endoscopy Technician-1: Misty Jules  Endoscopy RN-1: Juan Bella RN    Specimens:   ID Type Source Tests Collected by Time Destination   1 : Cecum Polyp- Cold Snare Technique Preservative   Sergei Presley MD 5/22/2018 1053 Pathology   2 : Ascending Colon Polyp- Cold Forceps Technique Preservative   Sergei Presley MD 5/22/2018 1055 Pathology   3 : Transverse Colon Polyp- Cold Snare Technique Preservative   Sergei Presley MD 5/22/2018 1057 Pathology   4 : Descending Colon Polyps- Biopsy Forceps Preservative   Sergei Presley MD 5/22/2018 1058 Pathology     H. Pylori  no    Assessment:  Intra-procedure medications     Anesthesia gave intra-procedure sedation and medications, see anesthesia flow sheet yes    Intravenous fluids: NS@ KVO     Vital signs stable     Abdominal assessment: round and soft     Recommendation:  Discharge patient per MD order.     Family or Friend   Permission to share finding with family or friend yes

## 2018-09-27 ENCOUNTER — HOSPITAL ENCOUNTER (OUTPATIENT)
Dept: NUTRITION | Age: 68
Discharge: HOME | End: 2018-09-27
Payer: MEDICARE

## 2018-09-27 PROCEDURE — 97802 MEDICAL NUTRITION INDIV IN: CPT | Performed by: DIETITIAN, REGISTERED

## 2018-09-27 NOTE — PROGRESS NOTES
[address], [place], [place], [place]  Phone: [phone] Fax: [phone]   Nutrition Assessment - Medical Nutrition Therapy   Outpatient Initial Evaluation         Patient Name: Jaden Johns : 1950   Treatment Diagnosis: Gout, High Cholesterol   Referral Source: [facility] [place] 2018     Gender: male Age: 70 y.o. Ht: 75 in Wt: 197.2  lb  kg   BMI: 24.6 BMR   Male 200 BMR Female    Anthropometrics Assessment: Per BMI, pt is considered modestly overweight. Pt was weighed today with shoes on. . Mild abdominal adiposity is evident based on visual observation     Past Medical History includes: Gout, hypothyroidism, Depression, Sleep Apnea, Bipolar Disorder, High Cholesterol     Pertinent Medications:   Vitamin D3, Fish oil, indomethacin, lamictal, levothyroxine, lithium carbonate CR, seroquel   Biochemical Data:   No results found for: HBA1C, HGBE8, TUY9QLSB, CBV4FNCB  Lab Results   Component Value Date/Time    Cholesterol (POC) 109 2018 03:12 PM    HDL Cholesterol (POC) 36 2018 03:12 PM    LDL Cholesterol (POC) 60 2018 03:12 PM    Triglycerides (POC) 67 2018 03:12 PM     Lab Results   Component Value Date/Time    ALT (SGPT) 33 2017 03:18 PM    AST (SGOT) 16 2018 04:06 PM    Alk. phosphatase 56 2017 03:18 PM    Bilirubin, total 0.4 2017 03:18 PM   24.6  Lab Results   Component Value Date/Time    Creatinine 1.18 2017 03:18 PM     Lab Results   Component Value Date/Time    BUN 16 2017 03:18 PM     No results found for: MCACR, MCA1, MCA2, MCA3, MCAU, MCAU2, MCALPOCT     Subjective/Assessment:   Pt is a 68yo male here today for diety help with gout. He has had 3 flair ups since first diagnosis and has never spoken with anyone about dietary management before.  He also would like to talk about general anti-inflammatory eating to aid with arthritis. He has lost 20# on his own in the past year by increasing physical activity and cutting out most of the added sugars in his diet from sodas. Currently exercises 7k-10k steps per day and hikes outside. He is also in PT for his knee. Current Eating Patterns: Made changes to diet based on information on inflammation found online. Cut out sodas, increased water intake, increased vegetable intake. Highest sources of purines currently in diet are oatmeal, meat (4-6oz typically), and seafood. Estimate Needs   Calories: 2100 Protein: 105 Carbs: 210 Fat: 93   Kcal/day  g/day  g/day  g/day        percent: 25  45  30               Education & Recommendations provided: Educated pt on sources of purines and their effect on gout. Worked with pt to identify current sources of purines in diet and make list of safe foods. Provided information on general anti-inflammatory foods as requested for help with arthritis.     Handouts Provided: []  Carbohydrates  []  Protein  []  Fiber  []  Serving Sizes  []  Meal and Snack Ideas  []  Food Journals []  Diabetes  []  Cholesterol  []  Sodium  []  Gen Nutr Guidelines  []  SBGM Guidelines  [x]  Others: Low Purine diet, Inflammatory foods list   Information Reviewed with: Pt    Readiness to Change Stage: []  Pre-contemplative    []  Contemplative  [x]  Preparation               []  Action                  []  Maintenance   Potential Barriers to Learning: []  Decline in memory    []  Language barrier   []  Other:  []  Emotional                  []  Limited mobility  []  Lack of motivation     [] Vision, hearing or cognitive impairment   Expected Compliance: Good /     Nutritional Goal - To promote lifestyle changes to result in:    []  Weight loss  []  Improved diabetic control  []  Decreased cholesterol levels  []  Decreased blood pressure  []  Weight maintenance []  Preventing any interactions associated with food allergies  []  Adequate weight gain toward goal weight  [x]  Other: decrease purine intake to mediate gout       Patient Goals:  SMART goals - use list of high/medium/low purine foods and balanced plate sheet provided to create meals each day to decrease purine intake and prevent gout flair-up  - Pt will record food and beverage intake to aid with compliance and awareness of food choices high in purines     Dietitian Signature: Derrick Gross MS RD Date: 9/27/2018   Follow-up: PRN  Time: 4:41 PM

## 2019-04-05 ENCOUNTER — OFFICE VISIT (OUTPATIENT)
Dept: SLEEP MEDICINE | Age: 69
End: 2019-04-05

## 2019-04-05 VITALS
WEIGHT: 196 LBS | HEIGHT: 75 IN | OXYGEN SATURATION: 97 % | BODY MASS INDEX: 24.37 KG/M2 | HEART RATE: 64 BPM | DIASTOLIC BLOOD PRESSURE: 70 MMHG | SYSTOLIC BLOOD PRESSURE: 144 MMHG

## 2019-04-05 DIAGNOSIS — G47.33 OSA (OBSTRUCTIVE SLEEP APNEA): Primary | ICD-10-CM

## 2019-04-05 DIAGNOSIS — E07.9 THYROID DISEASE: ICD-10-CM

## 2019-04-05 DIAGNOSIS — R45.86 MOOD DISTURBANCE: ICD-10-CM

## 2019-04-05 NOTE — PROGRESS NOTES
Theodore Palma is an 75 y. o. male who has been diagnosed as having obstructive sleep apnea.  Initial Apnea/Hypopnea index was 13 which indicates mild apnea.  He opted to have the disorder treated with an oral appliance.  He was referred to Dr. Haja Hull a mandibular advancement device which is stated as being used 100 percent of the time. He has failed CPAP and Bi-Level PAP in the past.      Polysomnogram () was performed again with oral appliance per patient. Please refer to interpretation report for further details.  Apnea/Hypopnea index of 9 which indicates mild apnea. He has tried the slumber bump but does not use is as often, he has been advised to sleep on his back by his chiropractor. Recent oximetry () indicative of desaturation >4 at 13 per hour. He was been advised to see Dr. Johnathan Goodell to get his deviated septum fixed but opted not to have the surgery.     He reports of mild snoring with the current oral appliance which tends to slip through the night and he has been back in to see Dr. Clemencia Licea who has requested a sleep.       [place] Sleepiness Score: 3    Visit Vitals  /70 (BP 1 Location: Right arm, BP Patient Position: Sitting)   Pulse 64   Ht 6' 3\" (1.905 m)   Wt 196 lb (88.9 kg)   SpO2 97%   BMI 24.50 kg/m²           General:   Alert, oriented, not in distress   Neck:   No JVD    Chest/Lungs:  symetrical lung expansion , no accessory muscle use    Extremities:  no obvious rashes , negative edema    Neuro:  No focal deficits ; No obvious tremor    Psych:  Normal affect ,  Normal countenance ;       Encounter Diagnoses   Name Primary?     JOAN (obstructive sleep apnea) Yes    BMI 24.0-24.9, adult     Mood disturbance     Thyroid disease      Orders Placed This Encounter    POLYSOMNOGRAPHY 1 NIGHT     Standing Status:   Future     Standing Expiration Date:   10/5/2019     Order Specific Question:   Reason for Exam     Answer:   JOAN     * The patient currently has a Moderate Risk for having sleep apnea. STOP-BANG score 4.  * Sleep testing was ordered for initial evaluation. * He was provided information on sleep apnea including coresponding risk factors and the importance of proper treatment. * Treatment options if indicated were reviewed today. Patient agrees to a trial of OAT therapy if indicated Jorge Gayle DDS). * Counseling was provided regarding proper sleep hygiene (including effect of light on sleep), paradoxical intention, stimulus control, sleep environment safety and safe driving. * Effect of sleep disturbance on weight was reviewed. We have recommended a dedicated weight loss through appropriate diet and an exercise regiment as significant weight reduction has been shown to reduce severity of obstructive sleep apnea. * Patient agrees to telephone [phone]  follow-up by myself or lead sleep technologist shortly after sleep study to review results and plan final management.     (patient has given permission for a message to be left regarding test results and further management if patient cannot be cannot be reached directly). Thank you for allowing us to participate in your patient's medical care. We'll keep you updated on these investigations. Office visit exceeded 25 minutes with counseling and direction of care taking up more than 50% of the allotted time. Sanket Montes De Oca MD, FAASM  Electronically signed.  04/05/19

## 2019-04-05 NOTE — PATIENT INSTRUCTIONS
[address] [facility] [place]. [place], [address]  Tel.  [phone]  Fax. [address] [facility] [address]  [place], [address] [facility]  Tel.  [phone]  Fax. [phone] [address]  Tel.  [phone]  Fax. [phone]     Sleep Apnea: After Your Visit  Your Care Instructions  Sleep apnea occurs when you frequently stop breathing for 10 seconds or longer during sleep. It can be mild to severe, based on the number of times per hour that you stop breathing or have slowed breathing. Blocked or narrowed airways in your nose, mouth, or throat can cause sleep apnea. Your airway can become blocked when your throat muscles and tongue relax during sleep. Sleep apnea is common, occurring in 1 out of 20 individuals. Individuals having any of the following characteristics should be evaluated and treated right away due to high risk and detrimental consequences from untreated sleep apnea:  1. Obesity  2. Congestive Heart failure  3. Atrial Fibrillation  4. Uncontrolled Hypertension  5. Type II Diabetes  6. Night-time Arrhythmias  7. Stroke  8. Pulmonary Hypertension  9. High-risk Driving Populations (pilots, truck drivers, etc.)  10. Patients Considering Weight-loss Surgery    How do you know you have sleep apnea? You probably have sleep apnea if you answer 'yes' to 3 or more of the following questions:  S - Have you been told that you Snore? T - Are you often Tired during the day? O - Has anyone Observed you stop breathing while sleeping? P- Do you have (or are being treated for) high blood Pressure? B - Are you obese (Body Mass Index > 35)? A - Is your Age 48years old or older? N - Is your Neck size greater than 16 inches? G - Are you male Gender? A sleep physician can prescribe a breathing device that prevents tissues in the throat from blocking your airway.  Or your doctor may recommend using a dental device (oral breathing device) to help keep your airway open. In some cases, surgery may be needed to remove enlarged tissues in the throat. Follow-up care is a key part of your treatment and safety. Be sure to make and go to all appointments, and call your doctor if you are having problems. It's also a good idea to know your test results and keep a list of the medicines you take. How can you care for yourself at home? · Lose weight, if needed. It may reduce the number of times you stop breathing or have slowed breathing. · Go to bed at the same time every night. · Sleep on your side. It may stop mild apnea. If you tend to roll onto your back, sew a pocket in the back of your pajama top. Put a tennis ball into the pocket, and stitch the pocket shut. This will help keep you from sleeping on your back. · Avoid alcohol and medicines such as sleeping pills and sedatives before bed. · Do not smoke. Smoking can make sleep apnea worse. If you need help quitting, talk to your doctor about stop-smoking programs and medicines. These can increase your chances of quitting for good. · Prop up the head of your bed 4 to 6 inches by putting bricks under the legs of the bed. · Treat breathing problems, such as a stuffy nose, caused by a cold or allergies. · Use a continuous positive airway pressure (CPAP) breathing machine if lifestyle changes do not help your apnea and your doctor recommends it. The machine keeps your airway from closing when you sleep. · If CPAP does not help you, ask your doctor whether you should try other breathing machines. A bilevel positive airway pressure machine has two types of air pressureâone for breathing in and one for breathing out. Another device raises or lowers air pressure as needed while you breathe. · If your nose feels dry or bleeds when using one of these machines, talk with your doctor about increasing moisture in the air. A humidifier may help.   · If your nose is runny or stuffy from using a breathing machine, talk with your doctor about using decongestants or a corticosteroid nasal spray. When should you call for help? Watch closely for changes in your health, and be sure to contact your doctor if:  · You still have sleep apnea even though you have made lifestyle changes. · You are thinking of trying a device such as CPAP. · You are having problems using a CPAP or similar machine. Where can you learn more? Go to Looker. Enter M976 in the search box to learn more about \"Sleep Apnea: After Your Visit. \"   © 6417-3202 Healthwise, Incorporated. Care instructions adapted under license by [facility] [employer] (which disclaims liability or warranty for this information). This care instruction is for use with your licensed healthcare professional. If you have questions about a medical condition or this instruction, always ask your healthcare professional. Izzy Mario any warranty or liability for your use of this information. PROPER SLEEP HYGIENE    What to avoid  · Do not have drinks with caffeine, such as coffee or black tea, for 8 hours before bed. · Do not smoke or use other types of tobacco near bedtime. Nicotine is a stimulant and can keep you awake. · Avoid drinking alcohol late in the evening, because it can cause you to wake in the middle of the night. · Do not eat a big meal close to bedtime. If you are hungry, eat a light snack. · Do not drink a lot of water close to bedtime, because the need to urinate may wake you up during the night. · Do not read or watch TV in bed. Use the bed only for sleeping and sexual activity. What to try  · Go to bed at the same time every night, and wake up at the same time every morning. Do not take naps during the day. · Keep your bedroom quiet, dark, and cool. · Get regular exercise, but not within 3 to 4 hours of your bedtime. .  · Sleep on a comfortable pillow and mattress.   · If watching the clock makes you anxious, turn it facing away from you so you cannot see the time. · If you worry when you lie down, start a worry book. Well before bedtime, write down your worries, and then set the book and your concerns aside. · Try meditation or other relaxation techniques before you go to bed. · If you cannot fall asleep, get up and go to another room until you feel sleepy. Do something relaxing. Repeat your bedtime routine before you go to bed again. · Make your house quiet and calm about an hour before bedtime. Turn down the lights, turn off the TV, log off the computer, and turn down the volume on music. This can help you relax after a busy day. Drowsy Driving  The [address] [place] [address] Road Traffic Safety Administration cites drowsiness as a causing factor in more than 377,441 police reported crashes annually, resulting in 76,000 injuries and 1,500 deaths. Other surveys suggest 55% of people polled have driven while drowsy in the past year, 23% had fallen asleep but not crashed, 3% crashed, and 2% had and accident due to drowsy driving. Who is at risk? Young Drivers: One study of drowsy driving accidents states that 55% of the drivers were under 25 years. Of those, 75% were male. Shift Workers and Travelers: People who work overnight or travel across time zones frequently are at higher risk of experiencing Circadian Rhythm Disorders. They are trying to work and function when their body is programed to sleep. Sleep Deprived: Lack of sleep has a serious impact on your ability to pay attention or focus on a task. Consistently getting less than the average of 8 hours your body needs creates partial or cumulative sleep deprivation. Untreated Sleep Disorders: Sleep Apnea, Narcolepsy, R.L.S., and other sleep disorders (untreated) prevent a person from getting enough restful sleep. This leads to excessive daytime sleepiness and increases the risk for drowsy driving accidents by up to 7 times.   Medications / Alcohol: Even over the counter medications can cause drowsiness. Medications that impair a drivers attention should have a warning label. Alcohol naturally makes you sleepy and on its own can cause accidents. Combined with excessive drowsiness its effects are amplified. Signs of Drowsy Driving:   * You don't remember driving the last few miles   * You may drift out of your graham   * You are unable to focus and your thoughts wander   * You may yawn more often than normal   * You have difficulty keeping your eyes open / nodding off   * Missing traffic signs, speeding, or tailgating  Prevention-   Good sleep hygiene, lifestyle and behavioral choices have the most impact on drowsy driving. There is no substitute for sleep and the average person requires 8 hours nightly. If you find yourself driving drowsy, stop and sleep. Consider the sleep hygiene tips provided during your visit as well. Medication Refill Policy: Refills for all medications require 1 week advance notice. Please have your pharmacy fax a refill request. We are unable to fax, or call in \"controled substance\" medications and you will need to pick these prescriptions up from our office. MOOI Activation    Thank you for requesting access to MOOI. Please follow the instructions below to securely access and download your online medical record. MOOI allows you to send messages to your doctor, view your test results, renew your prescriptions, schedule appointments, and more. How Do I Sign Up? 1. In your internet browser, go to [email or web address]. 2. Click on the First Time User? Click Here link in the Sign In box. You will see the New Member Sign Up page. 3. Enter your MOOI Access Code exactly as it appears below. You will not need to use this code after youve completed the sign-up process. If you do not sign up before the expiration date, you must request a new code. MOOI Access Code:  Activation code not generated  Current MOOI Status: Active (This is the date your [employer] access code will )    4. Enter the last four digits of your Social Security Number (xxxx) and Date of Birth (mm/dd/yyyy) as indicated and click Submit. You will be taken to the next sign-up page. 5. Create a Unw[employer] Nationt ID. This will be your [employer] login ID and cannot be changed, so think of one that is secure and easy to remember. 6. Create a [employer] password. You can change your password at any time. 7. Enter your Password Reset Question and Answer. This can be used at a later time if you forget your password. 8. Enter your e-mail address. You will receive e-mail notification when new information is available in [address] 9. Click Sign Up. You can now view and download portions of your medical record. 10. Click the Download Summary menu link to download a portable copy of your medical information. Additional Information    If you have questions, please call [phone]. Remember, [employer] is NOT to be used for urgent needs. For medical emergencies, dial 911.

## 2019-06-30 ENCOUNTER — HOSPITAL ENCOUNTER (OUTPATIENT)
Dept: SLEEP MEDICINE | Age: 69
Discharge: HOME | End: 2019-06-30
Payer: MEDICARE

## 2019-06-30 VITALS
OXYGEN SATURATION: 97 % | BODY MASS INDEX: 23.85 KG/M2 | DIASTOLIC BLOOD PRESSURE: 64 MMHG | SYSTOLIC BLOOD PRESSURE: 131 MMHG | HEIGHT: 75 IN | HEART RATE: 60 BPM | TEMPERATURE: 98.1 F | WEIGHT: 191.8 LBS

## 2019-06-30 DIAGNOSIS — G47.33 OSA (OBSTRUCTIVE SLEEP APNEA): ICD-10-CM

## 2019-06-30 PROCEDURE — 95810 POLYSOM 6/> YRS 4/> PARAM: CPT

## 2019-07-02 ENCOUNTER — TELEPHONE (OUTPATIENT)
Dept: SLEEP MEDICINE | Age: 69
End: 2019-07-02

## 2019-07-02 DIAGNOSIS — G47.33 OSA (OBSTRUCTIVE SLEEP APNEA): Primary | ICD-10-CM

## 2019-07-02 NOTE — TELEPHONE ENCOUNTER
Negrita Banegas is to be contacted by lead sleep technologist regarding results of Sleep Testing which was indicative of an average AHI of 8.1 per hour with an SpO2 hiral of 87% and SpO2 of < 88% being 4.9 minutes. Events occurred primarily in supine position. Severe snoring was present - use of an oral device was reported to occur during the testing period. * We have referred the patient back to Dentistry for oral appliance evaluation / re-titration. Patient should limit sleeping in supine position. *Follow-up office visit and re-testing to be done in 3-4 months after adjustment oral appliance to assess efficacy of therapy. Encounter Diagnosis   Name Primary?     JOAN (obstructive sleep apnea) Yes       Orders Placed This Encounter    REFERRAL TO DENTISTRY     Referral Priority:   Routine     Referral Type:   Consultation     Referral Reason:   Specialty Services Required     Referred to Provider:   Suman Alcala DDS     Number of Visits Requested:   1

## 2019-07-15 ENCOUNTER — DOCUMENTATION ONLY (OUTPATIENT)
Dept: SLEEP MEDICINE | Age: 69
End: 2019-07-15

## 2019-07-15 NOTE — TELEPHONE ENCOUNTER
Reviewed sleep study results with patient. He expressed understanding and is willing to proceed with having adjustments of his oral appliance therapy. He was advised to contact Dr. Mari Sanches. Please fax dental referral and schedule follow-up visit in 3-4 months.       Thanks     Bertha Byrd

## 2019-11-07 ENCOUNTER — DOCUMENTATION ONLY (OUTPATIENT)
Dept: SLEEP MEDICINE | Age: 69
End: 2019-11-07

## 2019-11-07 NOTE — PROGRESS NOTES
LVM for patient to call office to schedule follow up OAT for efficacy per letter from Dr. Montano Pun

## 2019-12-14 PROBLEM — J34.2 DEVIATED NASAL SEPTUM: Status: ACTIVE | Noted: 2019-12-14

## 2020-01-09 ENCOUNTER — OFFICE VISIT (OUTPATIENT)
Dept: SLEEP MEDICINE | Age: 70
End: 2020-01-09

## 2020-01-09 VITALS
WEIGHT: 200 LBS | HEIGHT: 74 IN | HEART RATE: 56 BPM | DIASTOLIC BLOOD PRESSURE: 64 MMHG | OXYGEN SATURATION: 99 % | SYSTOLIC BLOOD PRESSURE: 125 MMHG | TEMPERATURE: 97.2 F | BODY MASS INDEX: 25.67 KG/M2 | RESPIRATION RATE: 18 BRPM

## 2020-01-09 DIAGNOSIS — E07.9 THYROID DISORDER: ICD-10-CM

## 2020-01-09 DIAGNOSIS — R45.86 MOOD DISTURBANCE: ICD-10-CM

## 2020-01-09 DIAGNOSIS — G47.33 OSA (OBSTRUCTIVE SLEEP APNEA): Primary | ICD-10-CM

## 2020-01-09 DIAGNOSIS — I10 ESSENTIAL HYPERTENSION: ICD-10-CM

## 2020-01-09 RX ORDER — MOMETASONE FUROATE 50 UG/1
1 SPRAY, METERED NASAL
COMMUNITY
Start: 2019-11-16

## 2020-01-09 RX ORDER — IPRATROPIUM BROMIDE 42 UG/1
SPRAY, METERED NASAL
Refills: 9 | COMMUNITY
Start: 2019-10-10 | End: 2021-01-05

## 2020-01-09 NOTE — PROGRESS NOTES
Theodore Palma is an 78 y. o. male who has been diagnosed as having obstructive sleep apnea.  Initial Apnea/Hypopnea index was 13 which indicates mild apnea. He has failed CPAP and Bi-Level PAP in the past.  He opted to have the disorder treated with an oral appliance and was referred to Dr. Debra Olson a mandibular advancement device which is stated as being used 100 percent of the time.       Polysomnogram () was performed again with oral appliance per patient. Please refer to interpretation report for further details.  Apnea/Hypopnea index was 9 on oral appliance therapy - indicating mild residual apnea. He has tried the slumber bump but does not use is as often, he has been advised to sleep on his back by his chiropractor. He was been advised to see Dr. Laith Leung to get his deviated septum fixed but opted not to have the surgery.     He went back in to see Dr. Stephanie Barragan who has requested a sleep to assess efficacy of therapy. Sleep Testing (07-)  was indicative of an average AHI of 8.1 per hour with an SpO2 hiral of 87% and SpO2 of < 88% being 4.9 minutes. Events occurred primarily in supine position. Severe snoring was present - use of an oral device was reported to occur during the testing period. At today's visit he denies of significant snoring, snorting, periods of not breathing. Dewey Peres does not wake up frequently at night. He does not work shifts: Willem Santos indicates he does not get too little sleep at night. Sleep quality has improved. Sleep duration has increased as has level of alertness during the day. His mood has been stable with medication and meditation. Thyroid levels have been stable as well. He was recently diagnosed with HTN and started on medications.       [place] Sleepiness Score: 4       Sleep Review of Systems: notable for no difficulty falling asleep; infrequent awakenings at night;  regular dreaming noted; no nightmares ; no early morning headaches; no memory problems; no concentration issues; patient denies of being drowsy while driving. Objective:     Visit Vitals  /64 (BP 1 Location: Left arm, BP Patient Position: Sitting)   Pulse (!) 56   Temp 97.2 °F (36.2 °C) (Oral)   Resp 18   Ht 6' 2\" (1.88 m)   Wt 200 lb (90.7 kg)   SpO2 99%   BMI 25.68 kg/m²         General:   Not in acute distress   Eyes:  Anicteric sclerae, no obvious strabismus   Nose:  No Nasal septum deviation    Oropharynx:   Class 4 oropharyngeal outlet, thick tongue base, enlarged and boggy uvula, low-lying soft palate, narrow tonsilo-pharyngeal pilars   Tonsils:   tonsils are absent   Neck:    midline trachea   Chest/Lungs:  Equal lung expansion, clear on auscultation    CVS:  Normal rate, regular rhythm; no JVD   Skin:  Warm to touch; no obvious rashes   Neuro:  No focal deficits ; no obvious tremor    Psych:  Normal affect,  normal countenance;          Assessment:       ICD-10-CM ICD-9-CM    1. JOAN (obstructive sleep apnea) G47.33 327.23 POLYSOMNOGRAPHY 1 NIGHT   2. Mood disturbance R45.86 296.90    3. Thyroid disorder E07.9 246.9    4. BMI 25.0-25.9,adult Z68.25 V85.21    5. Essential hypertension I10 401.9          Plan:       * Sleep testing was ordered today to objectively assess for sleep disordered breathing on current therapy. Orders Placed This Encounter    POLYSOMNOGRAPHY 1 NIGHT     Standing Status:   Future     Standing Expiration Date:   7/9/2020     Order Specific Question:   Reason for Exam     Answer:   Assessment of efficacy of Oral Appliance Therapy     * Telephone [phone]  follow-up shortly after sleep study to review results.   (patient has given permission for a message to be left regarding test results and further management if patient cannot be cannot be reached directly). * Counseling was provided regarding safe driving and proper sleep hygiene.   * Counseling was provided regarding the importance of regular therapy and follow-up with dentist as per their recommendation and with us in 1 year or as needed. * Patient was asked to contact our office at any time for further questions regarding their sleep symptoms. Office visit exceeded 25 minutes with counseling and direction of care taking up more than 50% of the allotted time. Thank you for allowing to participate in your patient's medical care. Josiah Bejarano MD, FAASM  Electronically signed.  January 9, 2020

## 2020-01-09 NOTE — PATIENT INSTRUCTIONS
[address] [facility], [address]  Tel.  [phone]  Fax. [address] [facility]  [place], [address] [facility]  Tel.  [phone]  Fax. [phone] [address]  Tel.  [phone]  Fax. [phone]     Sleep Apnea: After Your Visit  Your Care Instructions  Sleep apnea occurs when you frequently stop breathing for 10 seconds or longer during sleep. It can be mild to severe, based on the number of times per hour that you stop breathing or have slowed breathing. Blocked or narrowed airways in your nose, mouth, or throat can cause sleep apnea. Your airway can become blocked when your throat muscles and tongue relax during sleep. Sleep apnea is common, occurring in 1 out of 20 individuals. Individuals having any of the following characteristics should be evaluated and treated right away due to high risk and detrimental consequences from untreated sleep apnea:  1. Obesity  2. Congestive Heart failure  3. Atrial Fibrillation  4. Uncontrolled Hypertension  5. Type II Diabetes  6. Night-time Arrhythmias  7. Stroke  8. Pulmonary Hypertension  9. High-risk Driving Populations (pilots, truck drivers, etc.)  10. Patients Considering Weight-loss Surgery    How do you know you have sleep apnea? You probably have sleep apnea if you answer 'yes' to 3 or more of the following questions:  S - Have you been told that you Snore? T - Are you often Tired during the day? O - Has anyone Observed you stop breathing while sleeping? P- Do you have (or are being treated for) high blood Pressure? B - Are you obese (Body Mass Index > 35)? A - Is your Age 48years old or older? N - Is your Neck size greater than 16 inches? G - Are you male Gender? A sleep physician can prescribe a breathing device that prevents tissues in the throat from blocking your airway.  Or your doctor may recommend using a dental device (oral breathing device) to help keep your airway open. In some cases, surgery may be needed to remove enlarged tissues in the throat. Follow-up care is a key part of your treatment and safety. Be sure to make and go to all appointments, and call your doctor if you are having problems. It's also a good idea to know your test results and keep a list of the medicines you take. How can you care for yourself at home? · Lose weight, if needed. It may reduce the number of times you stop breathing or have slowed breathing. · Go to bed at the same time every night. · Sleep on your side. It may stop mild apnea. If you tend to roll onto your back, sew a pocket in the back of your pajama top. Put a tennis ball into the pocket, and stitch the pocket shut. This will help keep you from sleeping on your back. · Avoid alcohol and medicines such as sleeping pills and sedatives before bed. · Do not smoke. Smoking can make sleep apnea worse. If you need help quitting, talk to your doctor about stop-smoking programs and medicines. These can increase your chances of quitting for good. · Prop up the head of your bed 4 to 6 inches by putting bricks under the legs of the bed. · Treat breathing problems, such as a stuffy nose, caused by a cold or allergies. · Use a continuous positive airway pressure (CPAP) breathing machine if lifestyle changes do not help your apnea and your doctor recommends it. The machine keeps your airway from closing when you sleep. · If CPAP does not help you, ask your doctor whether you should try other breathing machines. A bilevel positive airway pressure machine has two types of air pressureâone for breathing in and one for breathing out. Another device raises or lowers air pressure as needed while you breathe. · If your nose feels dry or bleeds when using one of these machines, talk with your doctor about increasing moisture in the air. A humidifier may help.   · If your nose is runny or stuffy from using a breathing machine, talk with your doctor about using decongestants or a corticosteroid nasal spray. When should you call for help? Watch closely for changes in your health, and be sure to contact your doctor if:  · You still have sleep apnea even though you have made lifestyle changes. · You are thinking of trying a device such as CPAP. · You are having problems using a CPAP or similar machine. Where can you learn more? Go to [employer]. Enter S126 in the search box to learn more about \"Sleep Apnea: After Your Visit. \"   © 0503-1230 Healthwise, [employer]orporated. Care instructions adapted under license by [employer] (which disclaims liability or warranty for this information). This care instruction is for use with your licensed healthcare professional. If you have questions about a medical condition or this instruction, always ask your healthcare professional. Lobito Pulido any warranty or liability for your use of this information. PROPER SLEEP HYGIENE    What to avoid  · Do not have drinks with caffeine, such as coffee or black tea, for 8 hours before bed. · Do not smoke or use other types of tobacco near bedtime. Nicotine is a stimulant and can keep you awake. · Avoid drinking alcohol late in the evening, because it can cause you to wake in the middle of the night. · Do not eat a big meal close to bedtime. If you are hungry, eat a light snack. · Do not drink a lot of water close to bedtime, because the need to urinate may wake you up during the night. · Do not read or watch TV in bed. Use the bed only for sleeping and sexual activity. What to try  · Go to bed at the same time every night, and wake up at the same time every morning. Do not take naps during the day. · Keep your bedroom quiet, dark, and cool. · Get regular exercise, but not within 3 to 4 hours of your bedtime. .  · Sleep on a comfortable pillow and mattress.   · If watching the clock makes you anxious, turn it facing away from you so you cannot see the time. · If you worry when you lie down, start a worry book. Well before bedtime, write down your worries, and then set the book and your concerns aside. · Try meditation or other relaxation techniques before you go to bed. · If you cannot fall asleep, get up and go to another room until you feel sleepy. Do something relaxing. Repeat your bedtime routine before you go to bed again. · Make your house quiet and calm about an hour before bedtime. Turn down the lights, turn off the TV, log off the computer, and turn down the volume on music. This can help you relax after a busy day. Drowsy Driving  The [address] [place] [address] Road Traffic Safety Administration cites drowsiness as a causing factor in more than 108,315 police reported crashes annually, resulting in 76,000 injuries and 1,500 deaths. Other surveys suggest 55% of people polled have driven while drowsy in the past year, 23% had fallen asleep but not crashed, 3% crashed, and 2% had and accident due to drowsy driving. Who is at risk? Young Drivers: One study of drowsy driving accidents states that 55% of the drivers were under 25 years. Of those, 75% were male. Shift Workers and Travelers: People who work overnight or travel across time zones frequently are at higher risk of experiencing Circadian Rhythm Disorders. They are trying to work and function when their body is programed to sleep. Sleep Deprived: Lack of sleep has a serious impact on your ability to pay attention or focus on a task. Consistently getting less than the average of 8 hours your body needs creates partial or cumulative sleep deprivation. Untreated Sleep Disorders: Sleep Apnea, Narcolepsy, R.L.S., and other sleep disorders (untreated) prevent a person from getting enough restful sleep. This leads to excessive daytime sleepiness and increases the risk for drowsy driving accidents by up to 7 times.   Medications / Alcohol: Even over the counter medications can cause drowsiness. Medications that impair a drivers attention should have a warning label. Alcohol naturally makes you sleepy and on its own can cause accidents. Combined with excessive drowsiness its effects are amplified. Signs of Drowsy Driving:   * You don't remember driving the last few miles   * You may drift out of your graham   * You are unable to focus and your thoughts wander   * You may yawn more often than normal   * You have difficulty keeping your eyes open / nodding off   * Missing traffic signs, speeding, or tailgating  Prevention-   Good sleep hygiene, lifestyle and behavioral choices have the most impact on drowsy driving. There is no substitute for sleep and the average person requires 8 hours nightly. If you find yourself driving drowsy, stop and sleep. Consider the sleep hygiene tips provided during your visit as well. Medication Refill Policy: Refills for all medications require 1 week advance notice. Please have your pharmacy fax a refill request. We are unable to fax, or call in \"controled substance\" medications and you will need to pick these prescriptions up from our office. [facility] Activation    Thank you for requesting access to [facility]. Please follow the instructions below to securely access and download your online medical record. [facility] allows you to send messages to your doctor, view your test results, renew your prescriptions, schedule appointments, and more. How Do I Sign Up? 1. In your internet browser, go to [email or web address]. 2. Click on the First Time User? Click Here link in the Sign In box. You will see the New Member Sign Up page. 3. Enter your [facility] Access Code exactly as it appears below. You will not need to use this code after youve completed the sign-up process. If you do not sign up before the expiration date, you must request a new code. [facility] Access Code:  Activation code not generated  Current [facility] Status: Active (This is the date your [facility] access code will )    4. Enter the last four digits of your Social Security Number (xxxx) and Date of Birth (mm/dd/yyyy) as indicated and click Submit. You will be taken to the next sign-up page. 5. Create a [facility] ID. This will be your [facility] login ID and cannot be changed, so think of one that is secure and easy to remember. 6. Create a [facility] password. You can change your password at any time. 7. Enter your Password Reset Question and Answer. This can be used at a later time if you forget your password. 8. Enter your e-mail address. You will receive e-mail notification when new information is available in [address] 9. Click Sign Up. You can now view and download portions of your medical record. 10. Click the Download Summary menu link to download a portable copy of your medical information. Additional Information    If you have questions, please call [phone]. Remember, [facility] is NOT to be used for urgent needs. For medical emergencies, dial 911.

## 2020-03-11 ENCOUNTER — HOSPITAL ENCOUNTER (OUTPATIENT)
Dept: SLEEP MEDICINE | Age: 70
Discharge: HOME | End: 2020-03-11
Payer: MEDICARE

## 2020-03-11 DIAGNOSIS — G47.33 OSA (OBSTRUCTIVE SLEEP APNEA): ICD-10-CM

## 2020-03-11 PROCEDURE — 95810 POLYSOM 6/> YRS 4/> PARAM: CPT | Performed by: INTERNAL MEDICINE

## 2020-03-12 VITALS
BODY MASS INDEX: 26.12 KG/M2 | DIASTOLIC BLOOD PRESSURE: 64 MMHG | HEIGHT: 74 IN | HEART RATE: 57 BPM | TEMPERATURE: 98.2 F | SYSTOLIC BLOOD PRESSURE: 135 MMHG | WEIGHT: 203.5 LBS | OXYGEN SATURATION: 96 %

## 2020-03-17 ENCOUNTER — TELEPHONE (OUTPATIENT)
Dept: SLEEP MEDICINE | Age: 70
End: 2020-03-17

## 2020-04-06 ENCOUNTER — VIRTUAL VISIT (OUTPATIENT)
Dept: SLEEP MEDICINE | Age: 70
End: 2020-04-06

## 2020-04-06 ENCOUNTER — DOCUMENTATION ONLY (OUTPATIENT)
Dept: SLEEP MEDICINE | Age: 70
End: 2020-04-06

## 2020-04-06 VITALS
WEIGHT: 203 LBS | DIASTOLIC BLOOD PRESSURE: 70 MMHG | SYSTOLIC BLOOD PRESSURE: 135 MMHG | BODY MASS INDEX: 26.05 KG/M2 | HEIGHT: 74 IN

## 2020-04-06 DIAGNOSIS — R45.86 MOOD DISTURBANCE: ICD-10-CM

## 2020-04-06 DIAGNOSIS — G47.33 OSA (OBSTRUCTIVE SLEEP APNEA): Primary | ICD-10-CM

## 2020-04-06 DIAGNOSIS — E07.9 THYROID DISEASE: ICD-10-CM

## 2020-04-06 NOTE — PROGRESS NOTES
[address] [facility] [address] [place]. [place], [address]  Tel.  [phone]  Fax. [address] [facility]  [place], [address]  Tel.  [phone]  Fax. [phone] [address]  Tel.  [phone]  Fax. [phone]       S>Theodore Harrison is a 69 y.o. male who was seen by synchronous (real-time) audio-video technology on 4/6/2020 after verifying identity using drivers license. Polysomnogram was performed and the results of the study were explained to the patient. Please refer to interpretation report for further details. Apnea/Hypopnea index of 4.9 which indicates mild positional apnea, testing was performed with Oral Appliance in use. He reports of having mild snoring but denies of having any significant sleep issues. He is currently using his Oral Appliance regularly while sleeping on his side and benefiting from therapy. No Known Allergies    He has a current medication list which includes the following prescription(s): azelastine, mometasone, ipratropium, amlodipine, OTHER, montelukast, levothyroxine, indomethacin, sildenafil (pulm.hypertension), cholecalciferol, eszopiclone, hydrocortisone, fluocinolone acetonide oil, acetaminophen, OTHER, OTHER, oxymetazoline, lithium carbonate cr, ranitidine, omega-3 fatty acids-vitamin e, lamotrigine, aspirin delayed-release, and quetiapine. .      He  has a past medical history of Arthritis, Bipolar affective disorder ([facility] [address] Deviated nasal septum (12/14/2019), GERD (gastroesophageal reflux disease), Gout, Hyperlipidemia (8/5/2011), Hypothyroidism, Inguinal hernia (11/27/2012), Psychiatric disorder, Rupture Achilles tendon, Sleep apnea (8/31/2011), and Thyroid disease. He also has no past medical history of Unspecified adverse effect of anesthesia.       O>      Visit Vitals  /70   Ht 6' 2\" (1.88 m)   Wt 203 lb (92.1 kg)   BMI 26.06 kg/m²           General:   Alert, oriented, not in distress Respiratory  Appeared to be breathing comfortably   Neuro:  Fluent Speech; No obvious facial asymmetry    Psych:  Normal affect ,  Normal countenance ;             A>    ICD-10-CM ICD-9-CM    1. JOAN (obstructive sleep apnea) G47.33 327.23    2. Mood disturbance R45.86 296.90    3. BMI 26.0-26.9,adult Z68.26 V85.22    4. Thyroid disease E07.9 246.9        P>    * Counseling was provided regarding the importance of regular therapy Oral Appliance / Positional Therapy and follow-up with dentist as per their recommendation and with us in 1 year or as needed. * Patient was asked to contact our office at any time for further questions regarding their sleep symptoms. Office visit exceeded 10 minutes with counseling and direction of care taking up more than 50% of the allotted time. Thank you for allowing us to participate in your patient's medical care. Pursuant to the emergency declaration under the [facility] [place]note 2[facility] waiver authority and the [employer] Act, this Virtual  Visit was conducted, with patient's consent, to reduce the patient's risk of exposure to COVID-19 and provide continuity of care for an established patient. Services were provided through a video synchronous discussion virtually to substitute for in-person clinic visit. Henri Jaeger MD, FAASM  Electronically signed.  04/06/20

## 2020-04-06 NOTE — PATIENT INSTRUCTIONS
[address] [place]. 1668 [facility], [address] Tel.  [phone] Fax. 100 [facility] 60 1001 [facility] [address], [address] Tel.  [phone] Fax. [phone] [address] Tel.  [phone] Fax. [phone] Sleep Apnea: After Your Visit Your Care Instructions Sleep apnea occurs when you frequently stop breathing for 10 seconds or longer during sleep. It can be mild to severe, based on the number of times per hour that you stop breathing or have slowed breathing. Blocked or narrowed airways in your nose, mouth, or throat can cause sleep apnea. Your airway can become blocked when your throat muscles and tongue relax during sleep. Sleep apnea is common, occurring in 1 out of 20 individuals. Individuals having any of the following characteristics should be evaluated and treated right away due to high risk and detrimental consequences from untreated sleep apnea: 
1. Obesity 2. Congestive Heart failure 3. Atrial Fibrillation 4. Uncontrolled Hypertension 5. Type II Diabetes 6. Night-time Arrhythmias 7. Stroke 8. Pulmonary Hypertension 9. High-risk Driving Populations (pilots, truck drivers, etc.) 10. Patients Considering Weight-loss Surgery How do you know you have sleep apnea? You probably have sleep apnea if you answer 'yes' to 3 or more of the following questions: S - Have you been told that you Snore? T - Are you often Tired during the day? O - Has anyone Observed you stop breathing while sleeping? P- Do you have (or are being treated for) high blood Pressure? B - Are you obese (Body Mass Index > 35)? A - Is your Age 48years old or older? N - Is your Neck size greater than 16 inches? G - Are you male Gender? A sleep physician can prescribe a breathing device that prevents tissues in the throat from blocking your airway.  Or your doctor may recommend using a dental device (oral breathing device) to help keep your airway open. In some cases, surgery may be needed to remove enlarged tissues in the throat. Follow-up care is a key part of your treatment and safety. Be sure to make and go to all appointments, and call your doctor if you are having problems. It's also a good idea to know your test results and keep a list of the medicines you take. How can you care for yourself at home? · Lose weight, if needed. It may reduce the number of times you stop breathing or have slowed breathing. · Go to bed at the same time every night. · Sleep on your side. It may stop mild apnea. If you tend to roll onto your back, sew a pocket in the back of your pajama top. Put a tennis ball into the pocket, and stitch the pocket shut. This will help keep you from sleeping on your back. · Avoid alcohol and medicines such as sleeping pills and sedatives before bed. · Do not smoke. Smoking can make sleep apnea worse. If you need help quitting, talk to your doctor about stop-smoking programs and medicines. These can increase your chances of quitting for good. · Prop up the head of your bed 4 to 6 inches by putting bricks under the legs of the bed. · Treat breathing problems, such as a stuffy nose, caused by a cold or allergies. · Use a continuous positive airway pressure (CPAP) breathing machine if lifestyle changes do not help your apnea and your doctor recommends it. The machine keeps your airway from closing when you sleep. · If CPAP does not help you, ask your doctor whether you should try other breathing machines. A bilevel positive airway pressure machine has two types of air pressureâone for breathing in and one for breathing out. Another device raises or lowers air pressure as needed while you breathe. · If your nose feels dry or bleeds when using one of these machines, talk with your doctor about increasing moisture in the air. A humidifier may help.  
· If your nose is runny or stuffy from using a breathing machine, talk with your doctor about using decongestants or a corticosteroid nasal spray. When should you call for help? Watch closely for changes in your health, and be sure to contact your doctor if: 
· You still have sleep apnea even though you have made lifestyle changes. · You are thinking of trying a device such as CPAP. · You are having problems using a CPAP or similar machine. Where can you learn more? Go to t[employer]n Mag[employer]in. Enter M991 in the search box to learn more about \"Sleep Apnea: After Your Visit. \"  
© 8821-6015 Healthwise, Incorporated. Care instructions adapted under license by [facility] (which disclaims liability or warranty for this information). This care instruction is for use with your licensed healthcare professional. If you have questions about a medical condition or this instruction, always ask your healthcare professional. Bowman Marker any warranty or liability for your use of this information. PROPER SLEEP HYGIENE What to avoid · Do not have drinks with caffeine, such as coffee or black tea, for 8 hours before bed. · Do not smoke or use other types of tobacco near bedtime. Nicotine is a stimulant and can keep you awake. · Avoid drinking alcohol late in the evening, because it can cause you to wake in the middle of the night. · Do not eat a big meal close to bedtime. If you are hungry, eat a light snack. · Do not drink a lot of water close to bedtime, because the need to urinate may wake you up during the night. · Do not read or watch TV in bed. Use the bed only for sleeping and sexual activity. What to try · Go to bed at the same time every night, and wake up at the same time every morning. Do not take naps during the day. · Keep your bedroom quiet, dark, and cool. · Get regular exercise, but not within 3 to 4 hours of your bedtime. Ellen Story · Sleep on a comfortable pillow and mattress. · If watching the clock makes you anxious, turn it facing away from you so you cannot see the time. · If you worry when you lie down, start a worry book. Well before bedtime, write down your worries, and then set the book and your concerns aside. · Try meditation or other relaxation techniques before you go to bed. · If you cannot fall asleep, get up and go to another room until you feel sleepy. Do something relaxing. Repeat your bedtime routine before you go to bed again. · Make your house quiet and calm about an hour before bedtime. Turn down the lights, turn off the TV, log off the computer, and turn down the volume on music. This can help you relax after a busy day. Drowsy Driving The [employer] cites drowsiness as a causing factor in more than 002,911 police reported crashes annually, resulting in 76,000 injuries and 1,500 deaths. Other surveys suggest 55% of people polled have driven while drowsy in the past year, 23% had fallen asleep but not crashed, 3% crashed, and 2% had and accident due to drowsy driving. Who is at risk? Young Drivers: One study of drowsy driving accidents states that 55% of the drivers were under 25 years. Of those, 75% were male. Shift Workers and Travelers: People who work overnight or travel across time zones frequently are at higher risk of experiencing Circadian Rhythm Disorders. They are trying to work and function when their body is programed to sleep. Sleep Deprived: Lack of sleep has a serious impact on your ability to pay attention or focus on a task. Consistently getting less than the average of 8 hours your body needs creates partial or cumulative sleep deprivation. Untreated Sleep Disorders: Sleep Apnea, Narcolepsy, R.L.S., and other sleep disorders (untreated) prevent a person from getting enough restful sleep.  This leads to excessive daytime sleepiness and increases the risk for drowsy driving accidents by up to 7 times. Medications / Alcohol: Even over the counter medications can cause drowsiness. Medications that impair a drivers attention should have a warning label. Alcohol naturally makes you sleepy and on its own can cause accidents. Combined with excessive drowsiness its effects are amplified. Signs of Drowsy Driving: * You don't remember driving the last few miles * You may drift out of your graham * You are unable to focus and your thoughts wander * You may yawn more often than normal 
 * You have difficulty keeping your eyes open / nodding off * Missing traffic signs, speeding, or tailgating Prevention-  
Good sleep hygiene, lifestyle and behavioral choices have the most impact on drowsy driving. There is no substitute for sleep and the average person requires 8 hours nightly. If you find yourself driving drowsy, stop and sleep. Consider the sleep hygiene tips provided during your visit as well. Medication Refill Policy: Refills for all medications require 1 week advance notice. Please have your pharmacy fax a refill request. We are unable to fax, or call in \"controled substance\" medications and you will need to pick these prescriptions up from our office. WhoKnows Activation Thank you for requesting access to WhoKnows. Please follow the instructions below to securely access and download your online medical record. WhoKnows allows you to send messages to your doctor, view your test results, renew your prescriptions, schedule appointments, and more. How Do I Sign Up? 1. In your internet browser, go to [email or web address] 2. Click on the First Time User? Click Here link in the Sign In box. You will see the New Member Sign Up page. 3. Enter your WhoKnows Access Code exactly as it appears below. You will not need to use this code after youve completed the sign-up process.  If you do not sign up before the expiration date, you must request a new code. Bitvore Access Code: Activation code not generated Current Bitvore Status: Active (This is the date your Bitvore access code will ) 4. Enter the last four digits of your Social Security Number (xxxx) and Date of Birth (mm/dd/yyyy) as indicated and click Submit. You will be taken to the next sign-up page. 5. Create a CYPHERt ID. This will be your Bitvore login ID and cannot be changed, so think of one that is secure and easy to remember. 6. Create a Bitvore password. You can change your password at any time. 7. Enter your Password Reset Question and Answer. This can be used at a later time if you forget your password. 8. Enter your e-mail address. You will receive e-mail notification when new information is available in [address] 9. Click Sign Up. You can now view and download portions of your medical record. 10. Click the Download Summary menu link to download a portable copy of your medical information. Additional Information If you have questions, please call [phone]. Remember, Bitvore is NOT to be used for urgent needs. For medical emergencies, dial 911.

## 2020-10-29 ENCOUNTER — TRANSCRIBE ORDER (OUTPATIENT)
Dept: SCHEDULING | Age: 70
End: 2020-10-29

## 2020-10-29 DIAGNOSIS — M25.852 LEFT HIP IMPINGEMENT SYNDROME: ICD-10-CM

## 2020-10-29 DIAGNOSIS — M16.12 PRIMARY OSTEOARTHRITIS OF LEFT HIP: ICD-10-CM

## 2020-10-29 DIAGNOSIS — M25.552 LEFT HIP PAIN: Primary | ICD-10-CM

## 2020-11-05 ENCOUNTER — HOSPITAL ENCOUNTER (OUTPATIENT)
Dept: GENERAL RADIOLOGY | Age: 70
Discharge: HOME | End: 2020-11-05
Attending: ORTHOPAEDIC SURGERY
Payer: MEDICARE

## 2020-11-05 DIAGNOSIS — M25.852 LEFT HIP IMPINGEMENT SYNDROME: ICD-10-CM

## 2020-11-05 DIAGNOSIS — M16.12 PRIMARY OSTEOARTHRITIS OF LEFT HIP: ICD-10-CM

## 2020-11-05 DIAGNOSIS — M25.552 LEFT HIP PAIN: ICD-10-CM

## 2020-11-05 PROCEDURE — 74011250636 HC RX REV CODE- 250/636: Performed by: RADIOLOGY

## 2020-11-05 PROCEDURE — 20610 DRAIN/INJ JOINT/BURSA W/O US: CPT

## 2020-11-05 PROCEDURE — 74011000636 HC RX REV CODE- 636: Performed by: RADIOLOGY

## 2020-11-05 PROCEDURE — 74011000250 HC RX REV CODE- 250: Performed by: RADIOLOGY

## 2020-11-05 RX ORDER — SODIUM BICARBONATE 42 MG/ML
2 INJECTION, SOLUTION INTRAVENOUS
Status: DISCONTINUED | OUTPATIENT
Start: 2020-11-05 | End: 2020-11-06 | Stop reason: HOSPADM

## 2020-11-05 RX ORDER — LIDOCAINE HYDROCHLORIDE 10 MG/ML
5 INJECTION, SOLUTION EPIDURAL; INFILTRATION; INTRACAUDAL; PERINEURAL
Status: COMPLETED | OUTPATIENT
Start: 2020-11-05 | End: 2020-11-05

## 2020-11-05 RX ORDER — BUPIVACAINE HYDROCHLORIDE 5 MG/ML
5 INJECTION, SOLUTION EPIDURAL; INTRACAUDAL
Status: COMPLETED | OUTPATIENT
Start: 2020-11-05 | End: 2020-11-05

## 2020-11-05 RX ORDER — TRIAMCINOLONE ACETONIDE 40 MG/ML
40 INJECTION, SUSPENSION INTRA-ARTICULAR; INTRAMUSCULAR
Status: COMPLETED | OUTPATIENT
Start: 2020-11-05 | End: 2020-11-05

## 2020-11-05 RX ADMIN — IOHEXOL 20 ML: 180 INJECTION INTRAVENOUS at 15:29

## 2020-11-05 RX ADMIN — BUPIVACAINE HYDROCHLORIDE 25 MG: 5 INJECTION, SOLUTION EPIDURAL; INTRACAUDAL; PERINEURAL at 15:29

## 2020-11-05 RX ADMIN — TRIAMCINOLONE ACETONIDE 40 MG: 40 INJECTION, SUSPENSION INTRA-ARTICULAR; INTRAMUSCULAR at 15:31

## 2020-11-05 RX ADMIN — LIDOCAINE HYDROCHLORIDE 5 ML: 10 INJECTION, SOLUTION EPIDURAL; INFILTRATION; INTRACAUDAL; PERINEURAL at 15:31

## 2020-11-18 ENCOUNTER — TELEPHONE (OUTPATIENT)
Dept: SLEEP MEDICINE | Age: 70
End: 2020-11-18

## 2021-01-05 ENCOUNTER — OFFICE VISIT (OUTPATIENT)
Dept: NEUROLOGY | Age: 71
End: 2021-01-05
Payer: MEDICARE

## 2021-01-05 VITALS
OXYGEN SATURATION: 98 % | HEART RATE: 59 BPM | SYSTOLIC BLOOD PRESSURE: 134 MMHG | RESPIRATION RATE: 18 BRPM | DIASTOLIC BLOOD PRESSURE: 78 MMHG

## 2021-01-05 DIAGNOSIS — R25.1 TREMOR OF LEFT HAND: ICD-10-CM

## 2021-01-05 DIAGNOSIS — R41.3 COMPLAINTS OF MEMORY DISTURBANCE: Primary | ICD-10-CM

## 2021-01-05 PROCEDURE — G8420 CALC BMI NORM PARAMETERS: HCPCS | Performed by: PSYCHIATRY & NEUROLOGY

## 2021-01-05 PROCEDURE — G8536 NO DOC ELDER MAL SCRN: HCPCS | Performed by: PSYCHIATRY & NEUROLOGY

## 2021-01-05 PROCEDURE — G8427 DOCREV CUR MEDS BY ELIG CLIN: HCPCS | Performed by: PSYCHIATRY & NEUROLOGY

## 2021-01-05 PROCEDURE — 99204 OFFICE O/P NEW MOD 45 MIN: CPT | Performed by: PSYCHIATRY & NEUROLOGY

## 2021-01-05 PROCEDURE — G9717 DOC PT DX DEP/BP F/U NT REQ: HCPCS | Performed by: PSYCHIATRY & NEUROLOGY

## 2021-01-05 PROCEDURE — 3017F COLORECTAL CA SCREEN DOC REV: CPT | Performed by: PSYCHIATRY & NEUROLOGY

## 2021-01-05 PROCEDURE — 1101F PT FALLS ASSESS-DOCD LE1/YR: CPT | Performed by: PSYCHIATRY & NEUROLOGY

## 2021-01-05 RX ORDER — MELOXICAM 15 MG/1
15 TABLET ORAL DAILY
COMMUNITY
End: 2021-05-05

## 2021-01-05 RX ORDER — LANOLIN ALCOHOL/MO/W.PET/CERES
1000 CREAM (GRAM) TOPICAL DAILY
COMMUNITY
End: 2021-05-03 | Stop reason: ALTCHOICE

## 2021-01-05 NOTE — PROGRESS NOTES
NEUROLOGY  NEW PATIENT EVALUATION/CONSULTATION       PATIENT NAME: Porter Hagen    MRN: [id number]    REASON FOR CONSULTATION: Memory impairment    01/05/21      Previous records (physician notes, laboratory reports, and radiology reports) and imaging studies were reviewed and summarized. My recommendations will be communicated back to the patient's physician(s) via electronic medical record and/or by [address] [facility] mail. HISTORY OF PRESENT ILLNESS:  Porter Hagen is a 69 y.o. left handed male presenting for evaluation of memory impairment. Onset and progression: 1 year short term memory deficits. Neuropsychiatric symptoms      Problems with judgment:No   Reduced interest in hobbies/activities: No   Repeats questions, stories, or statements: Yes    Trouble recalling people's names: Yes   Trouble learning how to use a tool or appliance: No   Forgetting the correct month or year: No   Difficulty handling financial affairs (bill-paying, taxes): No   Difficulty remembering appointments:No    Memory:Short term recall  Language: No word finding difficulty   Change in personality: None  Socially inappropriate behavior: None  Change in eating habits:None  Physical changes: LUE tremor x 1 year, denies bradykinesia, +shuffling gait reports 2/2 pain/instability into the LLE/LBP. Lithium levels recently checked and within normal range per pt.    Depressive symptoms: Yes, on medication  Hallucinations/Delusions: None    Ability to function:  Driving: Yes, no issues with navigation/MVAs  Finances: Handles on his own, no issues  Cooking: No  Manages own medication: Yes, no issues reported  Residing: at home with his wife    Prior work-up: None    Prior treatments: None      PAST MEDICAL HISTORY:  Past Medical History:   Diagnosis Date    Arthritis     Bipolar affective disorder ([facility] [address]     Deviated nasal septum 12/14/2019    ED (erectile dysfunction)     GERD (gastroesophageal reflux disease)     Gout     Hyperlipidemia 8/5/2011    Hypothyroidism     Inguinal hernia 11/27/2012    Psychiatric disorder     Bipolar Disorder    Rupture Achilles tendon     left    Sleep apnea 8/31/2011    does not use CPAP/uses dental appliance    Thyroid disease        PAST SURGICAL HISTORY:  Past Surgical History:   Procedure Laterality Date    COLONOSCOPY N/A 5/22/2018    COLONOSCOPY performed by Darrell Favre[facility] at [facility] ENDOSCOPY    HX COLONOSCOPY  10/13/14    polypectomy, f/u 3 y . dr. Magan Fernando HX ORTHOPAEDIC      tendon repair of thumb    HX OTHER SURGICAL      right inguinal hernia repair    HX OTHER SURGICAL      colonoscopy - hemorrhoids    HX TONSILLECTOMY         FAMILY HISTORY:   Family History   Problem Relation Age of Onset    Other Father         Snoring, insomnia    Suicide Father     Heart Attack Mother 79   Maternal GF-dementia      SOCIAL HISTORY:  Social History     Socioeconomic History    Marital status:      Spouse name: Not on file    Number of children: Not on file    Years of education: Not on file    Highest education level: Not on file   Tobacco Use    Smoking status: Former Smoker    Smokeless tobacco: Never Used    Tobacco comment: quit smoking cigarettes/pipe 40 yrs ago   Substance and Sexual Activity    Alcohol use: Yes     Comment:  2-3   drinks per week     Drug use: Never    Sexual activity: Not Currently   Other Topics Concern   Social History Narrative    ** Merged History Encounter **              MEDICATIONS:   Current Outpatient Medications   Medication Sig Dispense Refill    cyanocobalamin 1,000 mcg tablet Take 1,000 mcg by mouth daily.  meloxicam (MOBIC) 15 mg tablet Take 15 mg by mouth daily.  diclofenac (Voltaren Arthritis Pain) 1 % gel Apply  to affected area four (4) times daily. Apply 4 inches 4 times a day to affected area prn 350 g 3    amLODIPine (NORVASC) 5 mg tablet Take 1 Tab by mouth daily.  90 Tab 3    sildenafiL, pulmonary hypertension, (Revatio) 20 mg tablet 1 to 3 pills , 1 hour before relations . Use instead of Cialis 30 Tab 11    levothyroxine (SYNTHROID) 150 mcg tablet TAKE 1 TABLET DAILY 90 Tab 3    mometasone (NASONEX) 50 mcg/actuation nasal spray       montelukast (SINGULAIR) 10 mg tablet Take 10 mg by mouth daily.  Cholecalciferol, Vitamin D3, (VITAMIN D3) 2,000 unit cap capsule Take 2,000 Units by mouth daily. 30 Cap 11    hydrocortisone (HYTONE) 2.5 % topical cream Apply  to affected area two (2) times a day. use thin layer      acetaminophen (TYLENOL) 500 mg tablet Take  by mouth every six (6) hours as needed for Pain.  lithium CR (ESKALITH CR) 450 mg CR tablet Take 450 mg by mouth two (2) times a day.  lamotrigine (LAMICTAL) 150 mg tablet Take 150 mg by mouth two (2) times a day.  quetiapine (SEROQUEL) 100 mg tablet Take 200 mg by mouth nightly. ALLERGIES:  No Known Allergies      REVIEW OF SYSTEMS:  10 point ROS reviewed with patient. Please see scanned document under media. PHYSICAL EXAM:  Vital Signs:   Visit Vitals  /78   Pulse (!) 59   Resp 18   SpO2 98%        General Medical Exam:  General:  Well appearing, comfortable, in no apparent distress. Eyes/ENT: see cranial nerve examination. Neck: No masses appreciated. Full range of motion without tenderness. Respiratory:  Clear to auscultation, good air entry bilaterally. Cardiac:  Regular rate and rhythm, no murmur. GI:  Soft, non-tender, non-distended abdomen. Bowel sounds normal. No masses, organomegaly. Extremities:  No deformities, edema, or skin discoloration. Skin:  No rashes or lesions. Neurological:  · Mental Status:  Alert and oriented to person, place, and time with fluent speech. · MOCA: 26/30 (see scanned media)  · Cranial Nerves:   CNII/III/IV/VI: visual fields full to confrontation, EOMI, PERRL, no ptosis or nystagmus.    CN V: Facial sensation intact bilaterally, masseter 5/5   CN VII: Facial muscles symmetric and strong   CN VIII: Hears finger rub well bilaterally, intact vestibular function   CN IX/X: Normal palatal movement   CN XI: Full strength shoulder shrug bilaterally   CN XII: Tongue protrusion full and midline without fasciculation or atrophy  · Motor: Normal tone and muscle bulk with no pronator drift. No atrophy or fasciculations present on examination. Individual muscle group testing:  Shoulder abduction:   Left:5/5   Right : 5/5    Shoulder adduction:   Left:5/5   Right : 5/5    Elbow flexion:      Left:5/5   Right : 5/5  Elbow extension:    Left:5/5   Right : 5/5   Wrist flexion:    Left:5/5   Right : 5/5  Wrist extension:    Left:5/5   Right : 5/5  Arm pronation:   Left:5/5   Right : 5/5  Arm supination:   Left:5/5   Right : 5/5    Finger flexion:    Left:5/5   Right : 5/5    Finger extension:   Left:5/5   Right : 5/5   Finger abduction:  Left:5/5   Right : 5/5   Finger adduction:   Left:5/5   Right : 5/5  Hip flexion:     Left:5/5   Right : 5/5         Hip extension:   Left:5/5   Right : 5/5    Knee flexion:    Left:5/5   Right : 5/5    Knee extension:   Left:5/5   Right : 5/5    Dorsiflexion:     Left:5/5   Right : 5/5  Plantar flexion:    Left:5/5   Right : 5/5      · MSRs: No crossed adductors or clonus. RIGHT  LEFT   Brachioradialis 3+ 3+   Biceps 3+ 3+   Triceps 2+ 2+   Knee 2+ 2+   Achilles 2+ 2+        Plantar response Downward Downward          · Sensation: Decreased vibratory sensation to knees b/l, otherwise normal and symmetric perception of pinprick, temperature, light touch, proprioception;   · Coordination: No dysmetria. Normal rapid alternating movements; finger-to-nose and heel-to- shin testing are within normal limits. · Gait: Normal native gait, decreased arm swing RUE    PERTINENT DATA:  OUTSIDE RECORDS:  The patient provided outside medical records which were reviewed during the course of the visit. The relevant detail are summarized above. ASSESSMENT/PLAN:      ICD-10-CM ICD-9-CM    1. Complaints of memory disturbance  R41.3 780.93    2. Tremor of left hand  R25.1 66.0    69year old pleasant male with a h/o bipolar disorder, hypothyroidism presenting for evaluation of reported short term recall deficits and intermittent left hand tremors x 1 year. MOCA is 26/30 today and within normal range. Examination does not reveal any significant tremor, bradykinesia or gait instability suggestive of an evolving neurodegenerative process at this time, although we discussed risk for neuroleptic induced Parkinsonism and need for repeat clinical evaluation if any new/worsening symptoms. Lithium toxicity and/or thyroid abnormalities may also contribute to tremors-will attempt to obtain recent levels for review. Patient was reassured by above findings today and encouraged to follow up on an as needed basis if any additional concerns. Chidi Madison DO  Staff Neurologist  Diplomate, [address] [employer] [place] [employer] of Psychiatry & Neurology       CC Referring provider:    Zaire Ibarra MD

## 2021-01-26 ENCOUNTER — TELEPHONE (OUTPATIENT)
Dept: SLEEP MEDICINE | Age: 71
End: 2021-01-26

## 2021-01-26 ENCOUNTER — VIRTUAL VISIT (OUTPATIENT)
Dept: SLEEP MEDICINE | Age: 71
End: 2021-01-26
Payer: MEDICARE

## 2021-01-26 DIAGNOSIS — G47.33 OSA (OBSTRUCTIVE SLEEP APNEA): Primary | ICD-10-CM

## 2021-01-26 PROCEDURE — 99442 PR PHYS/QHP TELEPHONE EVALUATION 11-20 MIN: CPT | Performed by: INTERNAL MEDICINE

## 2021-01-26 NOTE — PATIENT INSTRUCTIONS
217 [facility] [place]. [place], [address]  Tel.  [phone]  Fax. 100 [facility] 60  [place], [address] [facility]  Tel.  [phone]  Fax. [phone] [address]  Tel.  [phone]  Fax. [phone]     Sleep Apnea: After Your Visit  Your Care Instructions  Sleep apnea occurs when you frequently stop breathing for 10 seconds or longer during sleep. It can be mild to severe, based on the number of times per hour that you stop breathing or have slowed breathing. Blocked or narrowed airways in your nose, mouth, or throat can cause sleep apnea. Your airway can become blocked when your throat muscles and tongue relax during sleep. Sleep apnea is common, occurring in 1 out of 20 individuals. Individuals having any of the following characteristics should be evaluated and treated right away due to high risk and detrimental consequences from untreated sleep apnea:  1. Obesity  2. Congestive Heart failure  3. Atrial Fibrillation  4. Uncontrolled Hypertension  5. Type II Diabetes  6. Night-time Arrhythmias  7. Stroke  8. Pulmonary Hypertension  9. High-risk Driving Populations (pilots, truck drivers, etc.)  10. Patients Considering Weight-loss Surgery    How do you know you have sleep apnea? You probably have sleep apnea if you answer 'yes' to 3 or more of the following questions:  S - Have you been told that you Snore? T - Are you often Tired during the day? O - Has anyone Observed you stop breathing while sleeping? P- Do you have (or are being treated for) high blood Pressure? B - Are you obese (Body Mass Index > 35)? A - Is your Age 48years old or older? N - Is your Neck size greater than 16 inches? G - Are you male Gender? A sleep physician can prescribe a breathing device that prevents tissues in the throat from blocking your airway.  Or your doctor may recommend using a dental device (oral breathing device) to help keep your airway open. In some cases, surgery may be needed to remove enlarged tissues in the throat. Follow-up care is a key part of your treatment and safety. Be sure to make and go to all appointments, and call your doctor if you are having problems. It's also a good idea to know your test results and keep a list of the medicines you take. How can you care for yourself at home? · Lose weight, if needed. It may reduce the number of times you stop breathing or have slowed breathing. · Go to bed at the same time every night. · Sleep on your side. It may stop mild apnea. If you tend to roll onto your back, sew a pocket in the back of your pajama top. Put a tennis ball into the pocket, and stitch the pocket shut. This will help keep you from sleeping on your back. · Avoid alcohol and medicines such as sleeping pills and sedatives before bed. · Do not smoke. Smoking can make sleep apnea worse. If you need help quitting, talk to your doctor about stop-smoking programs and medicines. These can increase your chances of quitting for good. · Prop up the head of your bed 4 to 6 inches by putting bricks under the legs of the bed. · Treat breathing problems, such as a stuffy nose, caused by a cold or allergies. · Use a continuous positive airway pressure (CPAP) breathing machine if lifestyle changes do not help your apnea and your doctor recommends it. The machine keeps your airway from closing when you sleep. · If CPAP does not help you, ask your doctor whether you should try other breathing machines. A bilevel positive airway pressure machine has two types of air pressureâone for breathing in and one for breathing out. Another device raises or lowers air pressure as needed while you breathe. · If your nose feels dry or bleeds when using one of these machines, talk with your doctor about increasing moisture in the air. A humidifier may help.   · If your nose is runny or stuffy from using a breathing machine, talk with your doctor about using decongestants or a corticosteroid nasal spray. When should you call for help? Watch closely for changes in your health, and be sure to contact your doctor if:  · You still have sleep apnea even though you have made lifestyle changes. · You are thinking of trying a device such as CPAP. · You are having problems using a CPAP or similar machine. Where can you learn more? Go to [employer]. Enter Z609 in the search box to learn more about \"Sleep Apnea: After Your Visit. \"   © 0663-2014 Healthwise, Incorporated. Care instructions adapted under license by [facility] (which disclaims liability or warranty for this information). This care instruction is for use with your licensed healthcare professional. If you have questions about a medical condition or this instruction, always ask your healthcare professional. [employer] any warranty or liability for your use of this information. PROPER SLEEP HYGIENE    What to avoid  · Do not have drinks with caffeine, such as coffee or black tea, for 8 hours before bed. · Do not smoke or use other types of tobacco near bedtime. Nicotine is a stimulant and can keep you awake. · Avoid drinking alcohol late in the evening, because it can cause you to wake in the middle of the night. · Do not eat a big meal close to bedtime. If you are hungry, eat a light snack. · Do not drink a lot of water close to bedtime, because the need to urinate may wake you up during the night. · Do not read or watch TV in bed. Use the bed only for sleeping and sexual activity. What to try  · Go to bed at the same time every night, and wake up at the same time every morning. Do not take naps during the day. · Keep your bedroom quiet, dark, and cool. · Get regular exercise, but not within 3 to 4 hours of your bedtime. .  · Sleep on a comfortable pillow and mattress.   · If watching the clock makes you anxious, turn it facing away from you so you cannot see the time. · If you worry when you lie down, start a worry book. Well before bedtime, write down your worries, and then set the book and your concerns aside. · Try meditation or other relaxation techniques before you go to bed. · If you cannot fall asleep, get up and go to another room until you feel sleepy. Do something relaxing. Repeat your bedtime routine before you go to bed again. · Make your house quiet and calm about an hour before bedtime. Turn down the lights, turn off the TV, log off the computer, and turn down the volume on music. This can help you relax after a busy day. Drowsy Driving  The [address] [place] [address] Road Traffic Safety Administration cites drowsiness as a causing factor in more than 825,015 police reported crashes annually, resulting in 76,000 injuries and 1,500 deaths. Other surveys suggest 55% of people polled have driven while drowsy in the past year, 23% had fallen asleep but not crashed, 3% crashed, and 2% had and accident due to drowsy driving. Who is at risk? Young Drivers: One study of drowsy driving accidents states that 55% of the drivers were under 25 years. Of those, 75% were male. Shift Workers and Travelers: People who work overnight or travel across time zones frequently are at higher risk of experiencing Circadian Rhythm Disorders. They are trying to work and function when their body is programed to sleep. Sleep Deprived: Lack of sleep has a serious impact on your ability to pay attention or focus on a task. Consistently getting less than the average of 8 hours your body needs creates partial or cumulative sleep deprivation. Untreated Sleep Disorders: Sleep Apnea, Narcolepsy, R.L.S., and other sleep disorders (untreated) prevent a person from getting enough restful sleep. This leads to excessive daytime sleepiness and increases the risk for drowsy driving accidents by up to 7 times.   Medications / Alcohol: Even over the counter medications can cause drowsiness. Medications that impair a drivers attention should have a warning label. Alcohol naturally makes you sleepy and on its own can cause accidents. Combined with excessive drowsiness its effects are amplified. Signs of Drowsy Driving:   * You don't remember driving the last few miles   * You may drift out of your graham   * You are unable to focus and your thoughts wander   * You may yawn more often than normal   * You have difficulty keeping your eyes open / nodding off   * Missing traffic signs, speeding, or tailgating  Prevention-   Good sleep hygiene, lifestyle and behavioral choices have the most impact on drowsy driving. There is no substitute for sleep and the average person requires 8 hours nightly. If you find yourself driving drowsy, stop and sleep. Consider the sleep hygiene tips provided during your visit as well. Medication Refill Policy: Refills for all medications require 1 week advance notice. Please have your pharmacy fax a refill request. We are unable to fax, or call in \"controled substance\" medications and you will need to pick these prescriptions up from our office. [email or web address] Activation    Thank you for requesting access to [email or web address]. Please follow the instructions below to securely access and download your online medical record. [email or web address] allows you to send messages to your doctor, view your test results, renew your prescriptions, schedule appointments, and more. How Do I Sign Up? 1. In your internet browser, go to [email or web address]. 2. Click on the First Time User? Click Here link in the Sign In box. You will see the New Member Sign Up page. 3. Enter your [email or web address] Access Code exactly as it appears below. You will not need to use this code after youve completed the sign-up process. If you do not sign up before the expiration date, you must request a new code. [email or web address] Access Code:  Activation code not generated  Current [email or web address] Status: Active (This is the date your [employer] access code will )    4. Enter the last four digits of your Social Security Number (xxxx) and Date of Birth (mm/dd/yyyy) as indicated and click Submit. You will be taken to the next sign-up page. 5. Create a [employer] ID. This will be your [employer] login ID and cannot be changed, so think of one that is secure and easy to remember. 6. Create a [employer] password. You can change your password at any time. 7. Enter your Password Reset Question and Answer. This can be used at a later time if you forget your password. 8. Enter your e-mail address. You will receive e-mail notification when new information is available in [address] 9. Click Sign Up. You can now view and download portions of your medical record. 10. Click the Download Summary menu link to download a portable copy of your medical information. Additional Information    If you have questions, please call [phone]. Remember, [employer] is NOT to be used for urgent needs. For medical emergencies, dial 911.

## 2021-05-03 PROBLEM — I10 ESSENTIAL HYPERTENSION: Status: ACTIVE | Noted: 2021-05-03

## 2021-05-05 ENCOUNTER — HOSPITAL ENCOUNTER (OUTPATIENT)
Dept: PREADMISSION TESTING | Age: 71
Discharge: HOME | End: 2021-05-05
Payer: MEDICARE

## 2021-05-05 VITALS
HEIGHT: 75 IN | BODY MASS INDEX: 23.3 KG/M2 | WEIGHT: 187.39 LBS | DIASTOLIC BLOOD PRESSURE: 67 MMHG | TEMPERATURE: 97.6 F | SYSTOLIC BLOOD PRESSURE: 132 MMHG | HEART RATE: 59 BPM | OXYGEN SATURATION: 98 %

## 2021-05-05 LAB
ABO + RH BLD: NORMAL
APPEARANCE UR: CLEAR
ATRIAL RATE: 53 BPM
BACTERIA URNS QL MICRO: NEGATIVE /HPF
BILIRUB UR QL: NEGATIVE
BLOOD GROUP ANTIBODIES SERPL: NORMAL
CALCULATED P AXIS, ECG09: 49 DEGREES
CALCULATED R AXIS, ECG10: 0 DEGREES
CALCULATED T AXIS, ECG11: 48 DEGREES
COLOR UR: NORMAL
DIAGNOSIS, 93000: NORMAL
EPITH CASTS URNS QL MICRO: NORMAL /LPF
ERYTHROCYTE [DISTWIDTH] IN BLOOD BY AUTOMATED COUNT: 12.4 % (ref 11.5–14.5)
EST. AVERAGE GLUCOSE BLD GHB EST-MCNC: 97 MG/DL
GLUCOSE UR STRIP.AUTO-MCNC: NEGATIVE MG/DL
HBA1C MFR BLD: 5 % (ref 4–5.6)
HCT VFR BLD AUTO: 37.1 % (ref 36.6–50.3)
HGB BLD-MCNC: 12.1 G/DL (ref 12.1–17)
HGB UR QL STRIP: NEGATIVE
HYALINE CASTS URNS QL MICRO: NORMAL /LPF (ref 0–5)
INR PPP: 1 (ref 0.9–1.1)
KETONES UR QL STRIP.AUTO: NEGATIVE MG/DL
LEUKOCYTE ESTERASE UR QL STRIP.AUTO: NEGATIVE
MCH RBC QN AUTO: 32.1 PG (ref 26–34)
MCHC RBC AUTO-ENTMCNC: 32.6 G/DL (ref 30–36.5)
MCV RBC AUTO: 98.4 FL (ref 80–99)
NITRITE UR QL STRIP.AUTO: NEGATIVE
NRBC # BLD: 0 K/UL (ref 0–0.01)
NRBC BLD-RTO: 0 PER 100 WBC
P-R INTERVAL, ECG05: 248 MS
PH UR STRIP: 7 [PH] (ref 5–8)
PLATELET # BLD AUTO: 259 K/UL (ref 150–400)
PMV BLD AUTO: 10.2 FL (ref 8.9–12.9)
PROT UR STRIP-MCNC: NEGATIVE MG/DL
PROTHROMBIN TIME: 10.3 SEC (ref 9–11.1)
Q-T INTERVAL, ECG07: 444 MS
QRS DURATION, ECG06: 102 MS
QTC CALCULATION (BEZET), ECG08: 416 MS
RBC # BLD AUTO: 3.77 M/UL (ref 4.1–5.7)
RBC #/AREA URNS HPF: NORMAL /HPF (ref 0–5)
SP GR UR REFRACTOMETRY: 1.01 (ref 1–1.03)
SPECIMEN EXP DATE BLD: NORMAL
UA: UC IF INDICATED,UAUC: NORMAL
UROBILINOGEN UR QL STRIP.AUTO: 0.2 EU/DL (ref 0.2–1)
VENTRICULAR RATE, ECG03: 53 BPM
WBC # BLD AUTO: 7.5 K/UL (ref 4.1–11.1)
WBC URNS QL MICRO: NORMAL /HPF (ref 0–4)

## 2021-05-05 PROCEDURE — 36415 COLL VENOUS BLD VENIPUNCTURE: CPT

## 2021-05-05 PROCEDURE — 86901 BLOOD TYPING SEROLOGIC RH(D): CPT

## 2021-05-05 PROCEDURE — 81001 URINALYSIS AUTO W/SCOPE: CPT

## 2021-05-05 PROCEDURE — 85610 PROTHROMBIN TIME: CPT

## 2021-05-05 PROCEDURE — 83036 HEMOGLOBIN GLYCOSYLATED A1C: CPT

## 2021-05-05 PROCEDURE — 85027 COMPLETE CBC AUTOMATED: CPT

## 2021-05-05 PROCEDURE — 93005 ELECTROCARDIOGRAM TRACING: CPT

## 2021-05-05 RX ORDER — CARBOXYMETHYLCELLULOSE SODIUM 5 MG/ML
1 SOLUTION/ DROPS OPHTHALMIC 2 TIMES DAILY
COMMUNITY

## 2021-05-05 NOTE — PERIOP NOTES
PREOPERATIVE INSTRUCTIONS REVIEWED WITH PATIENT. PATIENT GIVEN TWO BOTTLES CHG SOLUTION. INSTRUCTIONS REVIEWED ON USE OF CHG SOLUTION. PATIENT GIVEN SSI INFECTIONS SHEET; MRSA/MSSA TREATMENT INSTRUCTION SHEET GIVEN WITH AN EXPLANATION TO PATIENT THAT THEY WILL BE NOTIFIED IF TREATMENT INSTRUCTIONS NEED TO BE INITIATED. PATIENT WAS GIVEN THE OPPORTUNITY TO ASK QUESTIONS; REGARDING THE INFORMATION PROVIDED. PREOP DIET AND NUTRITION UPDATED GUIDELINES/ INSTRUCTIONS REVIEWED WITH PATIENT. PATIENT ADVISED THAT THEY MAY DRINK CLEAR LIQUIDS (12 OZ/4 HOURS) UP UNTIL ONE HOUR PRIOR TO ARRIVAL DAY OF SURGERY. PATIENT GIVEN INFO RE: ONLINE PREOP JOINT CLASS TODAY. Written instructions given to patient and reviewed re: preop Covid 19 testing and protocol for day of surgery. Patient verbalized understanding of need for self quarantine during the four days preop.

## 2021-05-06 LAB
BACTERIA SPEC CULT: NORMAL
BACTERIA SPEC CULT: NORMAL
SERVICE CMNT-IMP: NORMAL

## 2021-05-06 NOTE — PERIOP NOTES
PAT Nurse Practitioner   Pre-Operative Chart Review/Assessment:-ORTHOPEDIC                Patient Name:  Paige Prince                                                           Age:   66 y.o.    :  1950     Today's Date:  2021     Date of PAT:   2021      Date of Surgery:    2021      Procedure(s):  Left Total Hip Arthroplasty     Surgeon:   Dr. Michelle Gupta                       PLAN:      1)  Medical Clearance:  Dr. Maia Cutler IV      2)  Cardiac Clearance:  EKG and METs reviewed. No further cardiac testing requested. 3)  Diabetic Treatment Consult:  Not indicated. A1c-5.0      4)  Sleep Apnea evaluation:   +JOAN dx. Pt uses mouth guard. 5) Treatment for MRSA/Staph Aureus:  Neg      6) Additional Concerns:  Former smoker, HTN, GERD, Bipolar affective (followed by Dr. Swartz Friend)                Vital Signs:         Vitals:    21 0936   BP: 132/67   Pulse: (!) 59   Temp: 97.6 °F (36.4 °C)   SpO2: 98%   Weight: 85 kg (187 lb 6.3 oz)   Height: 6' 3\" (1.905 m)            ____________________________________________  PAST MEDICAL HISTORY  Past Medical History:   Diagnosis Date    Arthritis     Bipolar affective disorder ([address]     Deviated nasal septum 2019    ED (erectile dysfunction)     Essential hypertension 5/3/2021    GERD (gastroesophageal reflux disease)     Gout     Hyperlipidemia 2011    Hypothyroidism     Inguinal hernia 2012    Rupture Achilles tendon     left; resolved.     Sleep apnea 2011    does not use CPAP/uses dental appliance      ____________________________________________  PAST SURGICAL HISTORY  Past Surgical History:   Procedure Laterality Date    COLONOSCOPY N/A 2018    COL[facility] ENDOSCOPY    HX COLONOSCOPY  10/13/14    polypectomy, f/u 3 y . dr. Gail Jeffers Right     tendon repair of thumb    HX OTHER SURGICAL  2015    right inguinal hernia repair    HX OTHER SURGICAL      colonoscopy - hemorrhoids    HX TONSILLECTOMY  1960's      ____________________________________________  HOME MEDICATIONS  Current Outpatient Medications   Medication Sig    carboxymethylcellulose sodium (Refresh Tears) 0.5 % drop ophthalmic solution Administer 1 Drop to both eyes two (2) times a day.  cholecalciferol (Vitamin D3) (5000 Units/125 mcg) tab tablet Take 5,000 Units by mouth daily.  gabapentin (NEURONTIN) 300 mg capsule Take 300 mg by mouth nightly.  amLODIPine (Norvasc) 5 mg tablet Take 5 mg by mouth daily.  lidocaine (Salonpas, lidocaine,) 4 % patch by TransDERmal route every twenty-four (24) hours.  ketoconazole (NIZORAL) 2 % topical cream Apply  to affected area daily as needed for Skin Irritation.  levothyroxine (SYNTHROID) 150 mcg tablet TAKE 1 TABLET DAILY    cyclobenzaprine (FLEXERIL) 10 mg tablet Take 1 Tab by mouth three (3) times daily as needed for Muscle Spasm(s).  OTHER nightly. Sushil (Melatonin, L Theonine, Botanicals) for sleep.  diclofenac (Voltaren Arthritis Pain) 1 % gel Apply  to affected area four (4) times daily. Apply 4 inches 4 times a day to affected area prn    sildenafiL, pulmonary hypertension, (Revatio) 20 mg tablet 1 to 3 pills , 1 hour before relations . Use instead of Cialis    mometasone (NASONEX) 50 mcg/actuation nasal spray nightly.  hydrocortisone (HYTONE) 2.5 % topical cream Apply  to affected area two (2) times a day. use thin layer    acetaminophen (TYLENOL) 500 mg tablet Take 1,000 mg by mouth two (2) times a day.  lithium CR (ESKALITH CR) 450 mg CR tablet Take 450 mg by mouth two (2) times a day.  lamotrigine (LAMICTAL) 150 mg tablet Take 150 mg by mouth two (2) times a day.  quetiapine (SEROQUEL) 100 mg tablet Take 200 mg by mouth nightly.  traMADoL (ULTRAM) 50 mg tablet Take 1 Tab by mouth every six (6) hours as needed for Pain for up to 7 days. Max Daily Amount: 200 mg.      No current facility-administered medications for this encounter.       ____________________________________________  ALLERGIES  No Known Allergies   ____________________________________________  SOCIAL HISTORY  Social History     Tobacco Use    Smoking status: Former Smoker    Smokeless tobacco: Never Used    Tobacco comment: quit smoking cigarettes/pipe 40 yrs ago   Substance Use Topics    Alcohol use: Yes     Comment:  2-3   drinks per week       ____________________________________________        Labs:     Hospital Outpatient Visit on 05/05/2021   Component Date Value Ref Range Status    WBC 05/05/2021 7.5  4.1 - 11.1 K/uL Final    RBC 05/05/2021 3.77* 4.10 - 5.70 M/uL Final    HGB 05/05/2021 12.1  12.1 - 17.0 g/dL Final    HCT 05/05/2021 37.1  36.6 - 50.3 % Final    MCV 05/05/2021 98.4  80.0 - 99.0 FL Final    MCH 05/05/2021 32.1  26.0 - 34.0 PG Final    MCHC 05/05/2021 32.6  30.0 - 36.5 g/dL Final    RDW 05/05/2021 12.4  11.5 - 14.5 % Final    PLATELET 91/87/7665 356  150 - 400 K/uL Final    MPV 05/05/2021 10.2  8.9 - 12.9 FL Final    NRBC 05/05/2021 0.0  0  WBC Final    ABSOLUTE NRBC 05/05/2021 0.00  0.00 - 0.01 K/uL Final    Crossmatch Expiration 05/05/2021 05/15/2021,2359   Final    ABO/Rh(D) 05/05/2021 O POSITIVE   Final    Antibody screen 05/05/2021 NEG   Final    INR 05/05/2021 1.0  0.9 - 1.1   Final    A single therapeutic range for Vit K antagonists may not be optimal for all indications - see June, 2008 issue of Chest, American College of Chest Physicians Evidence-Based Clinical Practice Guidelines, 8th Edition.     Prothrombin time 05/05/2021 10.3  9.0 - 11.1 sec Final    Color 05/05/2021 YELLOW/STRAW    Final    Color Reference Range: Straw, Yellow or Dark Yellow    Appearance 05/05/2021 CLEAR  CLEAR   Final    Specific gravity 05/05/2021 1.014  1.003 - 1.030   Final    pH (UA) 05/05/2021 7.0  5.0 - 8.0   Final    Protein 05/05/2021 Negative  NEG mg/dL Final    Glucose 05/05/2021 Negative  NEG mg/dL Final    Ketone 05/05/2021 Negative  NEG mg/dL Final    Bilirubin 05/05/2021 Negative  NEG   Final    Blood 05/05/2021 Negative  NEG   Final    Urobilinogen 05/05/2021 0.2  0.2 - 1.0 EU/dL Final    Nitrites 05/05/2021 Negative  NEG   Final    Leukocyte Esterase 05/05/2021 Negative  NEG   Final    UA:UC IF INDICATED 05/05/2021 CULTURE NOT INDICATED BY UA RESULT  CNI   Final    WBC 05/05/2021 0-4  0 - 4 /hpf Final    RBC 05/05/2021 0-5  0 - 5 /hpf Final    Epithelial cells 05/05/2021 FEW  FEW /lpf Final    Epithelial cell category consists of squamous cells and /or transitional urothelial cells. Renal tubular cells, if present, are separately identified as such.     Bacteria 05/05/2021 Negative  NEG /hpf Final    Hyaline cast 05/05/2021 0-2  0 - 5 /lpf Final    Ventricular Rate 05/05/2021 53  BPM Final    Atrial Rate 05/05/2021 53  BPM Final    P-R Interval 05/05/2021 248  ms Final    QRS Duration 05/05/2021 102  ms Final    Q-T Interval 05/05/2021 444  ms Final    QTC Calculation (Bezet) 05/05/2021 416  ms Final    Calculated P Axis 05/05/2021 49  degrees Final    Calculated R Axis 05/05/2021 0  degrees Final    Calculated T Axis 05/05/2021 48  degrees Final    Diagnosis 05/05/2021    Final                    Value:Sinus bradycardia with 1st degree AV block  Otherwise normal ECG  No previous ECGs available  Confirmed by Cynthia Quinn ([id number]) on 5/5/2021 5:53:00 PM      Hemoglobin A1c 05/05/2021 5.0  4.0 - 5.6 % Final    Comment: NEW METHOD  PLEASE NOTE NEW REFERENCE RANGE  (NOTE)  HbA1C Interpretive Ranges  <5.7              Normal  5.7 - 6.4         Consider Prediabetes  >6.5              Consider Diabetes      Est. average glucose 05/05/2021 97  mg/dL Final    Special Requests: 05/05/2021 NO SPECIAL REQUESTS    Final    Culture result: 05/05/2021 MRSA NOT PRESENT    Final           Office Visit on 04/27/2021   Component Date Value Ref Range Status    Glucose 04/27/2021 82  65 - 99 mg/dL Final    BUN 04/27/2021 17  8 - 27 mg/dL Final    Creatinine 04/27/2021 1.27  0.76 - 1.27 mg/dL Final    GFR est non-AA 04/27/2021 56* >59 mL/min/1.73 Final    GFR est AA 04/27/2021 65  >59 mL/min/1.73 Final    Comment: **[facility] currently reports eGFR in compliance with the current**    recommendations of the [employer]. Diana Suazo will    update reporting as new guidelines are published from the NKF-ASN    Task force.  BUN/Creatinine ratio 04/27/2021 13  10 - 24 Final    Sodium 04/27/2021 142  134 - 144 mmol/L Final    Potassium 04/27/2021 4.4  3.5 - 5.2 mmol/L Final    Chloride 04/27/2021 104  96 - 106 mmol/L Final    CO2 04/27/2021 24  20 - 29 mmol/L Final    Calcium 04/27/2021 10.3* 8.6 - 10.2 mg/dL Final       Skin:     Denies open wounds, cuts, sores, rashes or other areas of concern in PAT assessment.           Ekaterina Cortez NP

## 2021-05-07 ENCOUNTER — HOSPITAL ENCOUNTER (OUTPATIENT)
Dept: PREADMISSION TESTING | Age: 71
Discharge: HOME | End: 2021-05-07
Payer: MEDICARE

## 2021-05-07 ENCOUNTER — TRANSCRIBE ORDER (OUTPATIENT)
Dept: REGISTRATION | Age: 71
End: 2021-05-07

## 2021-05-07 DIAGNOSIS — Z01.812 PRE-PROCEDURE LAB EXAM: Primary | ICD-10-CM

## 2021-05-07 DIAGNOSIS — Z01.812 PRE-PROCEDURE LAB EXAM: ICD-10-CM

## 2021-05-07 PROCEDURE — U0003 INFECTIOUS AGENT DETECTION BY NUCLEIC ACID (DNA OR RNA); SEVERE ACUTE RESPIRATORY SYNDROME CORONAVIRUS 2 (SARS-COV-2) (CORONAVIRUS DISEASE [COVID-19]), AMPLIFIED PROBE TECHNIQUE, MAKING USE OF HIGH THROUGHPUT TECHNOLOGIES AS DESCRIBED BY CMS-2020-01-R: HCPCS

## 2021-05-08 LAB — SARS-COV-2, COV2NT: NOT DETECTED

## 2021-05-12 ENCOUNTER — APPOINTMENT (OUTPATIENT)
Dept: GENERAL RADIOLOGY | Age: 71
End: 2021-05-12
Attending: PHYSICIAN ASSISTANT
Payer: MEDICARE

## 2021-05-12 ENCOUNTER — APPOINTMENT (OUTPATIENT)
Dept: GENERAL RADIOLOGY | Age: 71
End: 2021-05-12
Attending: ORTHOPAEDIC SURGERY
Payer: MEDICARE

## 2021-05-12 ENCOUNTER — ANESTHESIA EVENT (OUTPATIENT)
Dept: SURGERY | Age: 71
End: 2021-05-12
Payer: MEDICARE

## 2021-05-12 ENCOUNTER — ANESTHESIA (OUTPATIENT)
Dept: SURGERY | Age: 71
End: 2021-05-12
Payer: MEDICARE

## 2021-05-12 ENCOUNTER — HOSPITAL ENCOUNTER (OUTPATIENT)
Age: 71
Discharge: HOME | End: 2021-05-14
Attending: ORTHOPAEDIC SURGERY | Admitting: ORTHOPAEDIC SURGERY
Payer: MEDICARE

## 2021-05-12 DIAGNOSIS — M16.12 PRIMARY OSTEOARTHRITIS OF LEFT HIP: Primary | ICD-10-CM

## 2021-05-12 LAB
GLUCOSE BLD STRIP.AUTO-MCNC: 92 MG/DL (ref 65–117)
SERVICE CMNT-IMP: NORMAL

## 2021-05-12 PROCEDURE — 77030002933 HC SUT MCRYL J&J -A: Performed by: ORTHOPAEDIC SURGERY

## 2021-05-12 PROCEDURE — 74011000250 HC RX REV CODE- 250: Performed by: ANESTHESIOLOGY

## 2021-05-12 PROCEDURE — 74011000258 HC RX REV CODE- 258: Performed by: ORTHOPAEDIC SURGERY

## 2021-05-12 PROCEDURE — 77030031139 HC SUT VCRL2 J&J -A: Performed by: ORTHOPAEDIC SURGERY

## 2021-05-12 PROCEDURE — 74011250636 HC RX REV CODE- 250/636: Performed by: PHYSICIAN ASSISTANT

## 2021-05-12 PROCEDURE — 77030018547 HC SUT ETHBND1 J&J -B: Performed by: ORTHOPAEDIC SURGERY

## 2021-05-12 PROCEDURE — 77030035236 HC SUT PDS STRATFX BARB J&J -B: Performed by: ORTHOPAEDIC SURGERY

## 2021-05-12 PROCEDURE — 77030019557 HC ELECTRD VES SEAL MEDT -F: Performed by: ORTHOPAEDIC SURGERY

## 2021-05-12 PROCEDURE — 77030003666 HC NDL SPINAL BD -A: Performed by: ANESTHESIOLOGY

## 2021-05-12 PROCEDURE — P9045 ALBUMIN (HUMAN), 5%, 250 ML: HCPCS | Performed by: NURSE ANESTHETIST, CERTIFIED REGISTERED

## 2021-05-12 PROCEDURE — 74011000250 HC RX REV CODE- 250: Performed by: PHYSICIAN ASSISTANT

## 2021-05-12 PROCEDURE — 77030011264 HC ELECTRD BLD EXT COVD -A: Performed by: ORTHOPAEDIC SURGERY

## 2021-05-12 PROCEDURE — 74011000250 HC RX REV CODE- 250: Performed by: NURSE ANESTHETIST, CERTIFIED REGISTERED

## 2021-05-12 PROCEDURE — C9290 INJ, BUPIVACAINE LIPOSOME: HCPCS | Performed by: ORTHOPAEDIC SURGERY

## 2021-05-12 PROCEDURE — 77030041279 HC DRSG PRMSL AG MDII -B: Performed by: ORTHOPAEDIC SURGERY

## 2021-05-12 PROCEDURE — 77030005513 HC CATH URETH FOL11 MDII -B: Performed by: ORTHOPAEDIC SURGERY

## 2021-05-12 PROCEDURE — 77030040922 HC BLNKT HYPOTHRM STRY -A

## 2021-05-12 PROCEDURE — 74011000258 HC RX REV CODE- 258: Performed by: PHYSICIAN ASSISTANT

## 2021-05-12 PROCEDURE — 82962 GLUCOSE BLOOD TEST: CPT

## 2021-05-12 PROCEDURE — 77030014125 HC TY EPDRL BBMI -B: Performed by: ANESTHESIOLOGY

## 2021-05-12 PROCEDURE — 74011250636 HC RX REV CODE- 250/636: Performed by: ORTHOPAEDIC SURGERY

## 2021-05-12 PROCEDURE — 73501 X-RAY EXAM HIP UNI 1 VIEW: CPT

## 2021-05-12 PROCEDURE — C1776 JOINT DEVICE (IMPLANTABLE): HCPCS | Performed by: ORTHOPAEDIC SURGERY

## 2021-05-12 PROCEDURE — 76060000034 HC ANESTHESIA 1.5 TO 2 HR: Performed by: ORTHOPAEDIC SURGERY

## 2021-05-12 PROCEDURE — 77030020788: Performed by: ORTHOPAEDIC SURGERY

## 2021-05-12 PROCEDURE — 76210000006 HC OR PH I REC 0.5 TO 1 HR: Performed by: ORTHOPAEDIC SURGERY

## 2021-05-12 PROCEDURE — 97116 GAIT TRAINING THERAPY: CPT

## 2021-05-12 PROCEDURE — 97161 PT EVAL LOW COMPLEX 20 MIN: CPT

## 2021-05-12 PROCEDURE — 77030006802 HC BLD SAW RECIP BRSM -B: Performed by: ORTHOPAEDIC SURGERY

## 2021-05-12 PROCEDURE — 74011250636 HC RX REV CODE- 250/636: Performed by: NURSE ANESTHETIST, CERTIFIED REGISTERED

## 2021-05-12 PROCEDURE — 74011250636 HC RX REV CODE- 250/636: Performed by: ANESTHESIOLOGY

## 2021-05-12 PROCEDURE — 77030018831 HC SOL IRR H20 BAXT -A: Performed by: ORTHOPAEDIC SURGERY

## 2021-05-12 PROCEDURE — 77030040361 HC SLV COMPR DVT MDII -B

## 2021-05-12 PROCEDURE — 2709999900 HC NON-CHARGEABLE SUPPLY

## 2021-05-12 PROCEDURE — 74011250637 HC RX REV CODE- 250/637: Performed by: PHYSICIAN ASSISTANT

## 2021-05-12 PROCEDURE — 76010000162 HC OR TIME 1.5 TO 2 HR INTENSV-TIER 1: Performed by: ORTHOPAEDIC SURGERY

## 2021-05-12 PROCEDURE — 77030006822 HC BLD SAW SAG BRSM -B: Performed by: ORTHOPAEDIC SURGERY

## 2021-05-12 PROCEDURE — 74011000250 HC RX REV CODE- 250: Performed by: ORTHOPAEDIC SURGERY

## 2021-05-12 PROCEDURE — 2709999900 HC NON-CHARGEABLE SUPPLY: Performed by: ORTHOPAEDIC SURGERY

## 2021-05-12 PROCEDURE — 77030041397 HC DRSG PRIMASEAL AG MDII -B: Performed by: ORTHOPAEDIC SURGERY

## 2021-05-12 PROCEDURE — 77030010507 HC ADH SKN DERMBND J&J -B: Performed by: ORTHOPAEDIC SURGERY

## 2021-05-12 DEVICE — HIP H2 TOT ADV OTHER HD IMPL CAPPED SYNTHES: Type: IMPLANTABLE DEVICE | Status: FUNCTIONAL

## 2021-05-12 DEVICE — BIOLOX DELTA CERAMIC FEMORAL HEAD +1.5 36MM DIA 12/14 TAPER
Type: IMPLANTABLE DEVICE | Site: HIP | Status: FUNCTIONAL
Brand: BIOLOX DELTA

## 2021-05-12 DEVICE — PINNACLE HIP SOLUTIONS ALTRX POLYETHYLENE ACETABULAR LINER NEUTRAL 36MM ID 58MM OD
Type: IMPLANTABLE DEVICE | Site: HIP | Status: FUNCTIONAL
Brand: PINNACLE ALTRX

## 2021-05-12 DEVICE — ACTIS DUOFIX HIP PROSTHESIS (FEMORAL STEM 12/14 TAPER CEMENTLESS SIZE 8 HIGH COLLAR)  CE
Type: IMPLANTABLE DEVICE | Site: HIP | Status: FUNCTIONAL
Brand: ACTIS

## 2021-05-12 DEVICE — PINNACLE GRIPTION ACETABULAR SHELL SECTOR 58MM OD
Type: IMPLANTABLE DEVICE | Site: HIP | Status: FUNCTIONAL
Brand: PINNACLE GRIPTION

## 2021-05-12 DEVICE — PINNACLE CANCELLOUS BONE SCREW 6.5MM X 25MM
Type: IMPLANTABLE DEVICE | Site: HIP | Status: FUNCTIONAL
Brand: PINNACLE

## 2021-05-12 DEVICE — PINNACLE CANCELLOUS BONE SCREW 6.5MM X 35MM
Type: IMPLANTABLE DEVICE | Site: HIP | Status: FUNCTIONAL
Brand: PINNACLE

## 2021-05-12 RX ORDER — FENTANYL CITRATE 50 UG/ML
25 INJECTION, SOLUTION INTRAMUSCULAR; INTRAVENOUS
Status: DISCONTINUED | OUTPATIENT
Start: 2021-05-12 | End: 2021-05-12 | Stop reason: HOSPADM

## 2021-05-12 RX ORDER — GABAPENTIN 300 MG/1
300 CAPSULE ORAL
Status: DISCONTINUED | OUTPATIENT
Start: 2021-05-12 | End: 2021-05-14 | Stop reason: HOSPADM

## 2021-05-12 RX ORDER — OXYCODONE AND ACETAMINOPHEN 5; 325 MG/1; MG/1
1 TABLET ORAL AS NEEDED
Status: DISCONTINUED | OUTPATIENT
Start: 2021-05-12 | End: 2021-05-12 | Stop reason: HOSPADM

## 2021-05-12 RX ORDER — HYDROMORPHONE HYDROCHLORIDE 1 MG/ML
0.5 INJECTION, SOLUTION INTRAMUSCULAR; INTRAVENOUS; SUBCUTANEOUS
Status: DISPENSED | OUTPATIENT
Start: 2021-05-12 | End: 2021-05-13

## 2021-05-12 RX ORDER — MIDAZOLAM HYDROCHLORIDE 1 MG/ML
1 INJECTION, SOLUTION INTRAMUSCULAR; INTRAVENOUS AS NEEDED
Status: DISCONTINUED | OUTPATIENT
Start: 2021-05-12 | End: 2021-05-12 | Stop reason: HOSPADM

## 2021-05-12 RX ORDER — PROPOFOL 10 MG/ML
INJECTION, EMULSION INTRAVENOUS
Status: DISCONTINUED | OUTPATIENT
Start: 2021-05-12 | End: 2021-05-12 | Stop reason: HOSPADM

## 2021-05-12 RX ORDER — DIPHENHYDRAMINE HYDROCHLORIDE 50 MG/ML
12.5 INJECTION, SOLUTION INTRAMUSCULAR; INTRAVENOUS AS NEEDED
Status: DISCONTINUED | OUTPATIENT
Start: 2021-05-12 | End: 2021-05-12 | Stop reason: HOSPADM

## 2021-05-12 RX ORDER — MIDAZOLAM HYDROCHLORIDE 1 MG/ML
INJECTION, SOLUTION INTRAMUSCULAR; INTRAVENOUS AS NEEDED
Status: DISCONTINUED | OUTPATIENT
Start: 2021-05-12 | End: 2021-05-12 | Stop reason: HOSPADM

## 2021-05-12 RX ORDER — SODIUM CHLORIDE, SODIUM LACTATE, POTASSIUM CHLORIDE, CALCIUM CHLORIDE 600; 310; 30; 20 MG/100ML; MG/100ML; MG/100ML; MG/100ML
125 INJECTION, SOLUTION INTRAVENOUS CONTINUOUS
Status: DISCONTINUED | OUTPATIENT
Start: 2021-05-12 | End: 2021-05-12 | Stop reason: HOSPADM

## 2021-05-12 RX ORDER — PHENYLEPHRINE HCL IN 0.9% NACL 0.4MG/10ML
SYRINGE (ML) INTRAVENOUS AS NEEDED
Status: DISCONTINUED | OUTPATIENT
Start: 2021-05-12 | End: 2021-05-12 | Stop reason: HOSPADM

## 2021-05-12 RX ORDER — TRAMADOL HYDROCHLORIDE 50 MG/1
50 TABLET ORAL
Status: DISCONTINUED | OUTPATIENT
Start: 2021-05-12 | End: 2021-05-14 | Stop reason: HOSPADM

## 2021-05-12 RX ORDER — SODIUM CHLORIDE 0.9 % (FLUSH) 0.9 %
5-40 SYRINGE (ML) INJECTION EVERY 8 HOURS
Status: DISCONTINUED | OUTPATIENT
Start: 2021-05-12 | End: 2021-05-12 | Stop reason: HOSPADM

## 2021-05-12 RX ORDER — MIDAZOLAM HYDROCHLORIDE 1 MG/ML
0.5 INJECTION, SOLUTION INTRAMUSCULAR; INTRAVENOUS
Status: DISCONTINUED | OUTPATIENT
Start: 2021-05-12 | End: 2021-05-12 | Stop reason: HOSPADM

## 2021-05-12 RX ORDER — LIDOCAINE HYDROCHLORIDE 10 MG/ML
0.1 INJECTION, SOLUTION EPIDURAL; INFILTRATION; INTRACAUDAL; PERINEURAL AS NEEDED
Status: DISCONTINUED | OUTPATIENT
Start: 2021-05-12 | End: 2021-05-12 | Stop reason: HOSPADM

## 2021-05-12 RX ORDER — SODIUM CHLORIDE 0.9 % (FLUSH) 0.9 %
5-40 SYRINGE (ML) INJECTION EVERY 8 HOURS
Status: DISCONTINUED | OUTPATIENT
Start: 2021-05-12 | End: 2021-05-14 | Stop reason: HOSPADM

## 2021-05-12 RX ORDER — HYDROMORPHONE HYDROCHLORIDE 1 MG/ML
0.2 INJECTION, SOLUTION INTRAMUSCULAR; INTRAVENOUS; SUBCUTANEOUS
Status: DISCONTINUED | OUTPATIENT
Start: 2021-05-12 | End: 2021-05-12 | Stop reason: HOSPADM

## 2021-05-12 RX ORDER — SODIUM CHLORIDE 9 MG/ML
INJECTION, SOLUTION INTRAVENOUS
Status: DISCONTINUED | OUTPATIENT
Start: 2021-05-12 | End: 2021-05-12 | Stop reason: HOSPADM

## 2021-05-12 RX ORDER — LITHIUM CARBONATE 450 MG/1
450 TABLET ORAL 2 TIMES DAILY
Status: DISCONTINUED | OUTPATIENT
Start: 2021-05-12 | End: 2021-05-14 | Stop reason: HOSPADM

## 2021-05-12 RX ORDER — ACETAMINOPHEN 325 MG/1
650 TABLET ORAL EVERY 6 HOURS
Status: DISCONTINUED | OUTPATIENT
Start: 2021-05-12 | End: 2021-05-14 | Stop reason: HOSPADM

## 2021-05-12 RX ORDER — AMOXICILLIN 250 MG
1 CAPSULE ORAL 2 TIMES DAILY
Status: DISCONTINUED | OUTPATIENT
Start: 2021-05-12 | End: 2021-05-14 | Stop reason: HOSPADM

## 2021-05-12 RX ORDER — SODIUM CHLORIDE 9 MG/ML
1000 INJECTION, SOLUTION INTRAVENOUS CONTINUOUS
Status: DISCONTINUED | OUTPATIENT
Start: 2021-05-12 | End: 2021-05-12 | Stop reason: HOSPADM

## 2021-05-12 RX ORDER — HYDROXYZINE HYDROCHLORIDE 10 MG/1
10 TABLET, FILM COATED ORAL
Status: DISCONTINUED | OUTPATIENT
Start: 2021-05-12 | End: 2021-05-14 | Stop reason: HOSPADM

## 2021-05-12 RX ORDER — FACIAL-BODY WIPES
10 EACH TOPICAL DAILY PRN
Status: DISCONTINUED | OUTPATIENT
Start: 2021-05-12 | End: 2021-05-14 | Stop reason: HOSPADM

## 2021-05-12 RX ORDER — PROPOFOL 10 MG/ML
INJECTION, EMULSION INTRAVENOUS AS NEEDED
Status: DISCONTINUED | OUTPATIENT
Start: 2021-05-12 | End: 2021-05-12 | Stop reason: HOSPADM

## 2021-05-12 RX ORDER — SODIUM CHLORIDE 9 MG/ML
125 INJECTION, SOLUTION INTRAVENOUS CONTINUOUS
Status: DISPENSED | OUTPATIENT
Start: 2021-05-12 | End: 2021-05-13

## 2021-05-12 RX ORDER — CYCLOBENZAPRINE HCL 10 MG
10 TABLET ORAL
Status: DISCONTINUED | OUTPATIENT
Start: 2021-05-12 | End: 2021-05-12

## 2021-05-12 RX ORDER — AMLODIPINE BESYLATE 5 MG/1
5 TABLET ORAL DAILY
Status: DISCONTINUED | OUTPATIENT
Start: 2021-05-13 | End: 2021-05-14 | Stop reason: HOSPADM

## 2021-05-12 RX ORDER — ASPIRIN 81 MG/1
81 TABLET ORAL EVERY 12 HOURS
Status: DISCONTINUED | OUTPATIENT
Start: 2021-05-12 | End: 2021-05-14 | Stop reason: HOSPADM

## 2021-05-12 RX ORDER — PREGABALIN 75 MG/1
75 CAPSULE ORAL ONCE
Status: COMPLETED | OUTPATIENT
Start: 2021-05-12 | End: 2021-05-12

## 2021-05-12 RX ORDER — LEVOTHYROXINE SODIUM 150 UG/1
150 TABLET ORAL
Status: DISCONTINUED | OUTPATIENT
Start: 2021-05-13 | End: 2021-05-14 | Stop reason: HOSPADM

## 2021-05-12 RX ORDER — EPHEDRINE SULFATE/0.9% NACL/PF 50 MG/5 ML
5 SYRINGE (ML) INTRAVENOUS AS NEEDED
Status: DISCONTINUED | OUTPATIENT
Start: 2021-05-12 | End: 2021-05-12 | Stop reason: HOSPADM

## 2021-05-12 RX ORDER — ONDANSETRON 2 MG/ML
4 INJECTION INTRAMUSCULAR; INTRAVENOUS AS NEEDED
Status: DISCONTINUED | OUTPATIENT
Start: 2021-05-12 | End: 2021-05-12 | Stop reason: HOSPADM

## 2021-05-12 RX ORDER — FAMOTIDINE 20 MG/1
20 TABLET, FILM COATED ORAL 2 TIMES DAILY
Status: DISCONTINUED | OUTPATIENT
Start: 2021-05-12 | End: 2021-05-14 | Stop reason: HOSPADM

## 2021-05-12 RX ORDER — NALOXONE HYDROCHLORIDE 0.4 MG/ML
0.4 INJECTION, SOLUTION INTRAMUSCULAR; INTRAVENOUS; SUBCUTANEOUS AS NEEDED
Status: DISCONTINUED | OUTPATIENT
Start: 2021-05-12 | End: 2021-05-14 | Stop reason: HOSPADM

## 2021-05-12 RX ORDER — SODIUM CHLORIDE 0.9 % (FLUSH) 0.9 %
5-40 SYRINGE (ML) INJECTION AS NEEDED
Status: DISCONTINUED | OUTPATIENT
Start: 2021-05-12 | End: 2021-05-14 | Stop reason: HOSPADM

## 2021-05-12 RX ORDER — SODIUM CHLORIDE 0.9 % (FLUSH) 0.9 %
5-40 SYRINGE (ML) INJECTION AS NEEDED
Status: DISCONTINUED | OUTPATIENT
Start: 2021-05-12 | End: 2021-05-12 | Stop reason: HOSPADM

## 2021-05-12 RX ORDER — QUETIAPINE FUMARATE 100 MG/1
200 TABLET, FILM COATED ORAL
Status: DISCONTINUED | OUTPATIENT
Start: 2021-05-12 | End: 2021-05-14 | Stop reason: HOSPADM

## 2021-05-12 RX ORDER — BUPIVACAINE HYDROCHLORIDE 5 MG/ML
INJECTION, SOLUTION EPIDURAL; INTRACAUDAL
Status: COMPLETED | OUTPATIENT
Start: 2021-05-12 | End: 2021-05-12

## 2021-05-12 RX ORDER — POLYETHYLENE GLYCOL 3350 17 G/17G
17 POWDER, FOR SOLUTION ORAL DAILY
Status: DISCONTINUED | OUTPATIENT
Start: 2021-05-12 | End: 2021-05-14 | Stop reason: HOSPADM

## 2021-05-12 RX ORDER — ONDANSETRON 2 MG/ML
4 INJECTION INTRAMUSCULAR; INTRAVENOUS
Status: ACTIVE | OUTPATIENT
Start: 2021-05-12 | End: 2021-05-13

## 2021-05-12 RX ORDER — SODIUM CHLORIDE 9 MG/ML
50 INJECTION, SOLUTION INTRAVENOUS CONTINUOUS
Status: DISCONTINUED | OUTPATIENT
Start: 2021-05-12 | End: 2021-05-12 | Stop reason: HOSPADM

## 2021-05-12 RX ORDER — MORPHINE SULFATE 2 MG/ML
2 INJECTION, SOLUTION INTRAMUSCULAR; INTRAVENOUS
Status: DISCONTINUED | OUTPATIENT
Start: 2021-05-12 | End: 2021-05-12 | Stop reason: HOSPADM

## 2021-05-12 RX ORDER — OXYCODONE HYDROCHLORIDE 5 MG/1
5 TABLET ORAL
Status: DISCONTINUED | OUTPATIENT
Start: 2021-05-12 | End: 2021-05-14 | Stop reason: HOSPADM

## 2021-05-12 RX ORDER — FENTANYL CITRATE 50 UG/ML
50 INJECTION, SOLUTION INTRAMUSCULAR; INTRAVENOUS AS NEEDED
Status: DISCONTINUED | OUTPATIENT
Start: 2021-05-12 | End: 2021-05-12 | Stop reason: HOSPADM

## 2021-05-12 RX ORDER — EPHEDRINE SULFATE/0.9% NACL/PF 50 MG/5 ML
SYRINGE (ML) INTRAVENOUS AS NEEDED
Status: DISCONTINUED | OUTPATIENT
Start: 2021-05-12 | End: 2021-05-12 | Stop reason: HOSPADM

## 2021-05-12 RX ORDER — TRANEXAMIC ACID 100 MG/ML
INJECTION, SOLUTION INTRAVENOUS AS NEEDED
Status: DISCONTINUED | OUTPATIENT
Start: 2021-05-12 | End: 2021-05-12

## 2021-05-12 RX ORDER — ACETAMINOPHEN 325 MG/1
650 TABLET ORAL ONCE
Status: DISCONTINUED | OUTPATIENT
Start: 2021-05-12 | End: 2021-05-12 | Stop reason: HOSPADM

## 2021-05-12 RX ORDER — ACETAMINOPHEN 500 MG
1000 TABLET ORAL ONCE
Status: COMPLETED | OUTPATIENT
Start: 2021-05-12 | End: 2021-05-12

## 2021-05-12 RX ORDER — LIDOCAINE HYDROCHLORIDE 20 MG/ML
INJECTION, SOLUTION EPIDURAL; INFILTRATION; INTRACAUDAL; PERINEURAL AS NEEDED
Status: DISCONTINUED | OUTPATIENT
Start: 2021-05-12 | End: 2021-05-12 | Stop reason: HOSPADM

## 2021-05-12 RX ORDER — ALBUMIN HUMAN 50 G/1000ML
SOLUTION INTRAVENOUS AS NEEDED
Status: DISCONTINUED | OUTPATIENT
Start: 2021-05-12 | End: 2021-05-12 | Stop reason: HOSPADM

## 2021-05-12 RX ADMIN — PREGABALIN 75 MG: 75 CAPSULE ORAL at 07:46

## 2021-05-12 RX ADMIN — OXYCODONE HYDROCHLORIDE 5 MG: 5 TABLET ORAL at 18:24

## 2021-05-12 RX ADMIN — OXYCODONE HYDROCHLORIDE 5 MG: 5 TABLET ORAL at 15:20

## 2021-05-12 RX ADMIN — Medication 120 MCG: at 09:49

## 2021-05-12 RX ADMIN — BUPIVACAINE HYDROCHLORIDE 12.5 MG: 5 INJECTION, SOLUTION EPIDURAL; INTRACAUDAL; PERINEURAL at 09:03

## 2021-05-12 RX ADMIN — WATER 2 G: 1 INJECTION INTRAMUSCULAR; INTRAVENOUS; SUBCUTANEOUS at 09:15

## 2021-05-12 RX ADMIN — PROPOFOL 50 MG: 10 INJECTION, EMULSION INTRAVENOUS at 10:39

## 2021-05-12 RX ADMIN — ALBUMIN (HUMAN) 250 ML: 12.5 INJECTION, SOLUTION INTRAVENOUS at 10:14

## 2021-05-12 RX ADMIN — OXYCODONE HYDROCHLORIDE 5 MG: 5 TABLET ORAL at 12:36

## 2021-05-12 RX ADMIN — PROPOFOL 50 MCG/KG/MIN: 10 INJECTION, EMULSION INTRAVENOUS at 09:00

## 2021-05-12 RX ADMIN — ASPIRIN 81 MG: 81 TABLET, COATED ORAL at 19:10

## 2021-05-12 RX ADMIN — Medication 15 MG: at 10:28

## 2021-05-12 RX ADMIN — Medication 15 MG: at 09:49

## 2021-05-12 RX ADMIN — OXYCODONE HYDROCHLORIDE 5 MG: 5 TABLET ORAL at 21:04

## 2021-05-12 RX ADMIN — ACETAMINOPHEN 650 MG: 325 TABLET ORAL at 13:17

## 2021-05-12 RX ADMIN — SODIUM CHLORIDE 125 ML/HR: 9 INJECTION, SOLUTION INTRAVENOUS at 15:21

## 2021-05-12 RX ADMIN — MIDAZOLAM 2 MG: 1 INJECTION INTRAMUSCULAR; INTRAVENOUS at 08:58

## 2021-05-12 RX ADMIN — PROPOFOL 50 MG: 10 INJECTION, EMULSION INTRAVENOUS at 10:41

## 2021-05-12 RX ADMIN — TRANEXAMIC ACID 1 G: 100 INJECTION, SOLUTION INTRAVENOUS at 10:14

## 2021-05-12 RX ADMIN — TRANEXAMIC ACID 1 G: 100 INJECTION, SOLUTION INTRAVENOUS at 09:15

## 2021-05-12 RX ADMIN — LAMOTRIGINE 150 MG: 100 TABLET ORAL at 21:04

## 2021-05-12 RX ADMIN — ACETAMINOPHEN 1000 MG: 500 TABLET ORAL at 07:46

## 2021-05-12 RX ADMIN — GABAPENTIN 300 MG: 300 CAPSULE ORAL at 21:04

## 2021-05-12 RX ADMIN — SODIUM CHLORIDE: 900 INJECTION, SOLUTION INTRAVENOUS at 10:14

## 2021-05-12 RX ADMIN — Medication 10 MG: at 10:17

## 2021-05-12 RX ADMIN — ACETAMINOPHEN 650 MG: 325 TABLET ORAL at 19:10

## 2021-05-12 RX ADMIN — ASPIRIN 81 MG: 81 TABLET, COATED ORAL at 12:36

## 2021-05-12 RX ADMIN — Medication 15 MG: at 10:00

## 2021-05-12 RX ADMIN — SODIUM CHLORIDE 125 ML/HR: 9 INJECTION, SOLUTION INTRAVENOUS at 11:23

## 2021-05-12 RX ADMIN — Medication 10 MG: at 09:37

## 2021-05-12 RX ADMIN — LITHIUM CARBONATE 450 MG: 450 TABLET, EXTENDED RELEASE ORAL at 17:01

## 2021-05-12 RX ADMIN — POLYETHYLENE GLYCOL 3350 17 G: 17 POWDER, FOR SOLUTION ORAL at 12:36

## 2021-05-12 RX ADMIN — HYDROMORPHONE HYDROCHLORIDE 0.5 MG: 1 INJECTION, SOLUTION INTRAMUSCULAR; INTRAVENOUS; SUBCUTANEOUS at 13:18

## 2021-05-12 RX ADMIN — SODIUM CHLORIDE, POTASSIUM CHLORIDE, SODIUM LACTATE AND CALCIUM CHLORIDE 125 ML/HR: 600; 310; 30; 20 INJECTION, SOLUTION INTRAVENOUS at 07:50

## 2021-05-12 RX ADMIN — QUETIAPINE FUMARATE 200 MG: 100 TABLET ORAL at 21:04

## 2021-05-12 RX ADMIN — Medication 10 MG: at 09:27

## 2021-05-12 RX ADMIN — CEFAZOLIN SODIUM 2 G: 1 INJECTION, POWDER, FOR SOLUTION INTRAMUSCULAR; INTRAVENOUS at 17:02

## 2021-05-12 RX ADMIN — LIDOCAINE HYDROCHLORIDE 100 MG: 20 INJECTION, SOLUTION EPIDURAL; INFILTRATION; INTRACAUDAL; PERINEURAL at 09:00

## 2021-05-12 RX ADMIN — Medication 10 MG: at 09:41

## 2021-05-12 RX ADMIN — Medication 120 MCG: at 10:00

## 2021-05-12 NOTE — ROUTINE PROCESS
Patient: Ayan Gupta MRN: [id number]  SSN: [id number]   YOB: 1950  Age: 66 y.o. Sex: male     Patient is status post Procedure(s):  LEFT TOTAL HIP ARTHROPLASTY ANTERIOR APPROACH. Surgeon(s) and Role:     * Donald Allen MD - Primary    Local/Dose/Irrigation:  See mar                  Peripheral IV 05/12/21 Right Wrist (Active)                           Dressing/Packing:  Incision 05/12/21 Hip Left-Dressing/Treatment: Surgical glue; Alginate with Ag (05/12/21 1026)    Splint/Cast:  ]

## 2021-05-12 NOTE — ANESTHESIA PROCEDURE NOTES
Spinal Block    Start time: 5/12/2021 9:01 AM  End time: 5/12/2021 9:03 AM  Performed by: Trina Thopmson CRNA  Authorized by: Annette Brothers MD     Pre-procedure:   Indications: primary anesthetic  Preanesthetic Checklist: patient identified, risks and benefits discussed, anesthesia consent, site marked, patient being monitored and timeout performed    Timeout Time: 09:00          Spinal Block:   Patient Position:  Seated  Prep Region:  Lumbar  Prep: DuraPrep and patient draped      Location:  L2-3          Needle:   Needle Type:  Pencil-tip  Needle Gauge:  24 G  Attempts:  1      Events: CSF confirmed, no blood with aspiration and no paresthesia        Assessment:  Insertion:  Uncomplicated  Patient tolerance:  Patient tolerated the procedure well with no immediate complications

## 2021-05-12 NOTE — ANESTHESIA POSTPROCEDURE EVALUATION
Procedure(s):  LEFT TOTAL HIP ARTHROPLASTY ANTERIOR APPROACH.    spinal    Anesthesia Post Evaluation      Multimodal analgesia: multimodal analgesia used between 6 hours prior to anesthesia start to PACU discharge  Patient location during evaluation: PACU  Patient participation: complete - patient participated  Level of consciousness: awake  Pain score: 2  Pain management: adequate  Airway patency: patent  Anesthetic complications: no  Cardiovascular status: acceptable  Respiratory status: acceptable  Hydration status: acceptable  Comments: I have evaluated the patient and meets criteria for discharge from PACU. Karol Walden MD  Post anesthesia nausea and vomiting:  controlled  Final Post Anesthesia Temperature Assessment:  Normothermia (36.0-37.5 degrees C)      INITIAL Post-op Vital signs:   Vitals Value Taken Time   /50 05/12/21 1115   Temp 36.8 °C (98.2 °F) 05/12/21 1052   Pulse 64 05/12/21 1125   Resp 14 05/12/21 1125   SpO2 100 % 05/12/21 1125   Vitals shown include unvalidated device data.

## 2021-05-12 NOTE — PROGRESS NOTES
Patient assessed for readiness to ambulate. Vital Signs  Level of Consciousness: Alert (0)  Temp: 97.4 °F (36.3 °C)  Temp Source: Oral  Pulse (Heart Rate): (!) 57  Heart Rate Source: Monitor  Cardiac Rhythm: Sinus Antoni  Resp Rate: 16  BP: (!) 160/61  MAP (Monitor): 70  MAP (Calculated): 94  BP 1 Location: Left upper arm  BP 1 Method: Automatic  BP Patient Position: At rest  MEWS Score: 1. Patient ambulated with assistance of 1 nurses. Patient ambulated with gait belt and walker. Patient walked to [place]. Patient returned safely to bed.

## 2021-05-12 NOTE — PERIOP NOTES
TRANSFER - OUT REPORT:    Verbal report given to Elieser Scott (name) on Frederic Hager  being transferred to [facility](unit) for routine post - op       Report consisted of patients Situation, Background, Assessment and   Recommendations(SBAR). Time Pre op antibiotic [id number]  Anesthesia Stop time: 7572  Us Present on Transfer to floor:n  Order for Us on Chart:n  Discharge Prescriptions with Chart:n    Information from the following report(s) SBAR, OR Summary, Intake/Output, MAR and Accordion was reviewed with the receiving nurse. Opportunity for questions and clarification was provided. Is the patient on 02? NO       L/Min        Other     Is the patient on a monitor? NO    Is the nurse transporting with the patient? NO    Surgical Waiting Area notified of patient's transfer from PACU? YES      The following personal items collected during your admission accompanied patient upon transfer:   Dental Appliance: Dental Appliances: None  Vision: Visual Aid: Glasses  Hearing Aid:    Jewelry: Jewelry: None  Clothing: Clothing: (topacu)  Other Valuables:  Other Valuables: Eyeglasses(to pacu)  Valuables sent to safe:        Glasses and clothes to floor with pt

## 2021-05-12 NOTE — PROGRESS NOTES
Problem: Mobility Impaired (Adult and Pediatric)  Goal: *Acute Goals and Plan of Care (Insert Text)  Description: FUNCTIONAL STATUS PRIOR TO ADMISSION: Patient was independent and active without use of DME.    HOME SUPPORT PRIOR TO ADMISSION: The patient lived with wife but did not require assist.    Physical Therapy Goals  Initiated 5/12/2021    1. Patient will move from supine to sit and sit to supine  and roll side to side in bed with modified independence within 4 days. 2. Patient will perform sit to stand with modified independence within 4 days. 3. Patient will ambulate with modified independence for 150 feet with the least restrictive device within 4 days. 4. Patient will ascend/descend 8 stairs with 1 handrail(s) with modified independence within 4 days. 5. Patient will perform ANNA home exercise program per protocol with modified independence within 4 days. Outcome: Progressing Towards Goal   PHYSICAL THERAPY EVALUATION  Patient: Paige Prince (62 y.o. male)  Date: 5/12/2021  Primary Diagnosis: Osteoarthritis of left hip, unspecified osteoarthritis type [M16.12]  Procedure(s) (LRB):  LEFT TOTAL HIP ARTHROPLASTY ANTERIOR APPROACH (Left) Day of Surgery   Precautions:   Fall, WBAT      ASSESSMENT  Based on the objective data described below, the patient presents with decreased mobility compared to baseline after L ANNA, POD0. He was able to ambulate with the RW with VSS and min assist with fair overall pain control. Reviewed plan of care and safety considerations for hospital stay with patient and wife. Although he does have a walker at home, it is borrowed from a friend and too short, so order placed for tall RW for home use. Expect that he will progress well with his mobility as long as pain is under control and that he will be able to return home with HHPT and family support. He may be ready for D/C after morning session if pain is controlled adequately and he is medically ready. He will need to be able to manage several flights of stairs to enter his home. Current Level of Function Impacting Discharge (mobility/balance): min assist overall for short distance ambulation with RW    Functional Outcome Measure: The patient scored Total: 55/100 on the Barthel Index which is indicative of moderately impaired ability to care for basic self needs/dependency on others. Other factors to consider for discharge: none     Patient will benefit from skilled therapy intervention to address the above noted impairments. PLAN :  Recommendations and Planned Interventions: bed mobility training, transfer training, gait training, therapeutic exercises, patient and family training/education, and therapeutic activities      Frequency/Duration: Patient will be followed by physical therapy:  twice daily to address goals. Recommendation for discharge: (in order for the patient to meet his/her long term goals)  Physical therapy at least 2 days/week in the home     This discharge recommendation:  Has been made in collaboration with the attending provider and/or case management    IF patient discharges home will need the following DME: rolling walker         SUBJECTIVE:   Patient stated It maybe feels a little bit better, but not much.     OBJECTIVE DATA SUMMARY:   HISTORY:    Past Medical History:   Diagnosis Date    Arthritis     Bipolar affective disorder ([address]     Deviated nasal septum 12/14/2019    ED (erectile dysfunction)     Essential hypertension 5/3/2021    GERD (gastroesophageal reflux disease)     Gout     Hyperlipidemia 8/5/2011    Hypothyroidism     Inguinal hernia 11/27/2012    Rupture Achilles tendon     left; resolved. Sleep apnea 8/31/2011    does not use CPAP/uses dental appliance     Past Surgical History:   Procedure Laterality Date    COLONOSCOPY N/A 5/22/2018    COLONOSCOPY performed by Troy Gunderson MD at [facility] ENDOSCOPY    HX COLONOSCOPY  10/13/14    polypectomy, f/u 3 y . dr. Limon [facility] ORTHOPAEDIC Right     tendon repair of thumb    HX OTHER SURGICAL  2015    right inguinal hernia repair    HX OTHER SURGICAL      colonoscopy - hemorrhoids    HX TONSILLECTOMY  1960's       Personal factors and/or comorbidities impacting plan of care: as noted above    Home Situation  Home Environment: Apartment  # Steps to Enter: 39  Rails to Enter: Yes  Hand Rails : Left  One/Two Story Residence: One story  Living Alone: No  Support Systems: Spouse/Significant Other/Partner  Patient Expects to be Discharged to[de-identified] Apartment  Current DME Used/Available at Home: Raised toilet seat, Walker, rolling, Cane, straight  Tub or Shower Type: Tub    EXAMINATION/PRESENTATION/DECISION MAKING:   Critical Behavior:  Neurologic State: Alert  Orientation Level: Oriented X4  Cognition: Appropriate decision making     Hearing:     Skin:  post op bandage in place L anterior hip without drainage noted  Edema: none noted  Range Of Motion:  AROM: Within functional limits                       Strength:    Strength: Within functional limits(L hip 3/5 post op due to pain)                    Tone & Sensation:   Tone: Normal              Sensation: Intact               Coordination:  Coordination: Within functional limits  Vision:      Functional Mobility:  Bed Mobility:     Supine to Sit: Contact guard assistance; Additional time  Sit to Supine: Minimum assistance; Additional time     Transfers:  Sit to Stand: Contact guard assistance; Adaptive equipment; Additional time  Stand to Sit: Contact guard assistance; Adaptive equipment; Additional time                       Balance:   Sitting: Intact; Without support  Standing: Intact; With support(hands on RW)  Ambulation/Gait Training:  Distance (ft): 50 Feet (ft)  Assistive Device: Gait belt;Walker, rolling  Ambulation - Level of Assistance: Minimal assistance; Adaptive equipment; Additional time        Gait Abnormalities: Antalgic;Decreased step clearance; Step to gait  Right Side Weight Bearing: Full  Left Side Weight Bearing: As tolerated  Base of Support: Shift to right  Stance: Left decreased  Speed/Nadine: Slow  Step Length: Left shortened;Right shortened  Swing Pattern: Left asymmetrical     Interventions: Safety awareness training; Tactile cues; Verbal cues; Visual/Demos            Stairs: Therapeutic Exercises: Ankle pumps    Functional Measure:  Barthel Index:    Bathin  Bladder: 10  Bowels: 10  Groomin  Dressin  Feeding: 10  Mobility: 0  Stairs: 0  Toilet Use: 5  Transfer (Bed to Chair and Back): 10  Total: 55/100       The Barthel ADL Index: Guidelines  1. The index should be used as a record of what a patient does, not as a record of what a patient could do. 2. The main aim is to establish degree of independence from any help, physical or verbal, however minor and for whatever reason. 3. The need for supervision renders the patient not independent. 4. A patient's performance should be established using the best available evidence. Asking the patient, friends/relatives and nurses are the usual sources, but direct observation and common sense are also important. However direct testing is not needed. 5. Usually the patient's performance over the preceding 24-48 hours is important, but occasionally longer periods will be relevant. 6. Middle categories imply that the patient supplies over 50 per cent of the effort. 7. Use of aids to be independent is allowed. [place] Peer., Barthel, D.W. (7770). Functional evaluation: the Barthel Index. [address] [facility] (14)2. MILES Bolden, Keiry Padilla., Merlin Ates., [address] (). Measuring the change indisability after inpatient rehabilitation; comparison of the responsiveness of the Barthel Index and Functional [place] Measure. Journal of Neurology, Neurosurgery, and Psychiatry, 66(4), 387-888.   Pankaj Navarrete, N.J.A, TRISTAN Brady, Willie Beck MMelinaA. (2004.) Assessment of post-stroke quality of life in cost-effectiveness studies: The usefulness of the Barthel Index and the EuroQoL-5D. Quality of Life Research, 15, 755-55        Physical Therapy Evaluation Charge Determination   History Examination Presentation Decision-Making   HIGH Complexity :3+ comorbidities / personal factors will impact the outcome/ POC  MEDIUM Complexity : 3 Standardized tests and measures addressing body structure, function, activity limitation and / or participation in recreation  LOW Complexity : Stable, uncomplicated  LOW Complexity : FOTO score of       Based on the above components, the patient evaluation is determined to be of the following complexity level: LOW     Pain Rating: Moderate L anterior hip pain    Activity Tolerance:   Good    After treatment patient left in no apparent distress:   Supine in bed, Call bell within reach, Caregiver / family present, Side rails x 3, and ice in place L anterior hip    COMMUNICATION/EDUCATION:   The patients plan of care was discussed with: Registered nurse. Fall prevention education was provided and the patient/caregiver indicated understanding., Patient/family have participated as able in goal setting and plan of care. , and Patient/family agree to work toward stated goals and plan of care.     Thank you for this referral.  Priscilla Ocampo, PT   Time Calculation: 18 mins

## 2021-05-12 NOTE — PROGRESS NOTES
Ortho Post-Op Note    5/12/2021  2:25 PM    POD:  Day of Surgery  S/P:  Procedure(s):  LEFT TOTAL HIP ARTHROPLASTY ANTERIOR APPROACH    Afebrile/VSS, NAD, A&O x 3  Doing well without complaints of nausea  Pain well controlled, having more pain after PT  Calves soft/NTTP Bilaterally  Thigh soft. Dressing clean and dry  Moving lower extremities well. Neurocirculatory exam intact and within normal range. Lab Results   Component Value Date/Time    HGB 12.1 05/05/2021 09:35 AM    INR 1.0 05/05/2021 09:35 AM     Recent Labs     04/27/21  1403 03/11/21  0937 03/04/21  0934 08/18/20  0910   CREA 1.27 1.26 1.41* 1.14   BUN 17 19 20 15     Estimated Creatinine Clearance: 62.8 mL/min (by C-G formula based on SCr of 1.27 mg/dL).   Visit Vitals  BP (!) 174/68 (BP 1 Location: Left upper arm, BP Patient Position: At rest)   Pulse (!) 56   Temp 97.4 °F (36.3 °C)   Resp 16   Ht 6' 3\" (1.905 m)   Wt 85 kg (187 lb 6 oz)   SpO2 100%   BMI 23.42 kg/m²       PLAN:  DVT prophylaxis:  mg bid  WBAT with PT-mobilization  Pain Control: tylenol, celebrex, oxycodone  Plan to D/C home in 1-2 days      Flavio Meza, [address], [address] [facility]   Department of Orthopaedics  [phone]

## 2021-05-12 NOTE — OP NOTES
Name: Tl Gilbert  MRN:  [id number]  : 1950  Age:  66 y.o. Surgery Date: 2021      OPERATIVE REPORT - LEFT TOTAL HIP ARTHROPLASTY -   ANTERIOR APPROACH    PREOPERATIVE DIAGNOSIS: Osteoarthritis, left hip. POSTOPERATIVE DIAGNOSIS: Osteoarthritis, left hip. PROCEDURE PERFORMED: Left total hip arthroplasty with Computer navigation [facility]). SURGEON: Delma Russo MD    FIRST ASSISTANT:  Mina Cabrera PA-C    ANESTHESIA: Spinal with sedation. PRE-OP ANTIBIOTIC: Ancef 2g    COMPLICATIONS: None. ESTIMATED BLOOD LOSS: 250 mL. SPECIMENS REMOVED: None. COMPONENTS IMPLANTED:   Implant Name Type Inv. Item Serial No.  Lot No. LRB No. Used Action   SCREW BNE L25MM DIA6.5MM CANC HIP S STL GRIPTION FULL THRD - SNA  SCREW BNE L25MM DIA6.5MM CANC HIP S STL GRIPTION FULL THRD NA [facility] [id number] Left 1 Implanted   SCREW BNE L35MM DIA6.5MM CANC HIP DOME PINN - SNA  SCREW BNE L35MM DIA6.5MM CANC HIP DOME PINN NA [facility] [id number] Left 1 Implanted   CUP ACET [id number] HIP GRIPTION ERICKA CEMENTLESS FIX SECT SER - SNA  CUP ACET [id number] HIP GRIPTION ERICKA CEMENTLESS FIX SECT SER NA [facility] [id number] Left 1 Implanted   LINER ACET OD58MM ID36MM HIP ALTRX PINN - SNA  LINER ACET OD58MM ID36MM HIP ALTRX PINN NA [facility] [id number] Left 1 Implanted   STEM FEM SZ 8 L111MM 12/14 TAPR HI OFFSET HIP DUOFIX CLLRD - SNA  STEM FEM SZ 8 L111MM 12/14 TAPR HI OFFSET HIP DUOFIX CLLRD NA [facility] [id number] Left 1 Implanted   HEAD FEM [id number] +1.5MM OFFSET 12/14 TAPR HIP CERAMIC - SNA  HEAD FEM [id number] +1.5MM OFFSET 12/14 TAPR HIP CERAMIC NA [facility] [id number] Left 1 Implanted       INDICATIONS: The patient is an 66 yrs male with progressive debilitating left hip and groin pain due to severe osteoarthritis.  Symptoms have progressed despite comprehensive conservative treatment and they present for left total hip replacement. Risks include bleeding, infection, damage to the LFCN, leg length descrepency, blood clots, pulmonary embolism, and death. The patient understood these risks as well as potential benefits and alternatives and elected to proceed. DESCRIPTION OF PROCEDURE:  Anesthetic was initiated. Preoperative dose of intravenous antibiotic was given within 30 minutes of incision. One gram of tranexamic acid was also administered intravenously. 2 grams of tranexamic acid with 0.4mL of epi topically at the end of the case. The left side was confirmed during a timeout and the hip was prepped and draped in the usual sterile fashion. Skin was covered with Ioban occlusive dressing. The patient underwent straight catheterization at the end of the case. An initial AP xray was obtained with 10 degrees internal rotation to use for our computer navigation. Direct anterior exposure was made to the patient's hip through the sartorius tensor interval well lateral of the ASIS to protect the LFCN. Anterior hip vasculature including the ascending branches of the lateral circumflex artery were cauterized. Retractors were taken out to observe for bleeding and there was none. A T capsulotomy was made with bovie electrocautery. The Charnley retractor was repositioned for exposure. The femoral neck was osteotomized. Femoral head was removed from the acetabulum, which was exposed and soft tissues were removed. The acetabulum was progressively  reamed  and a 58 mm trial shell was impacted with good press-fit. This was removed and a 58 mm [place] Gription shell was impacted in the acetabulum in 40 degrees of abduction in an anatomic-type anteversion. Bone spurs were removed and 6.5 screws x2 were placed. The polyethylene liner was placed. Femur was positioned and elevated from the wound. Appropriate soft tissue releases were made including the posterior capsule and obturator internus.  The medullary canal was entered with a box osteotome. The femoral canal was broached to a size 8. Calcar planed and then trialed. A 36 mm , +1 hip ball was the most appropriate for leg length and tension with offset to best match native offset. This was then entered into [facility] navigation and we confirmed appropriate leg length and offset restoration compared to the pre-operative template and intra-operative image. The hip was dislocated. The trial was removed and the real stem was impacted. The real hip ball was placed. The hip was reduced. After copious irrigation, the capsule was closed with #1 Stratafix barbed sutures. I irrigated the skin, subcutaneous and deep wound. I closed the fascia of the tensor fascia carol ann with #1 stratafix barbed sutures. Skin and subcutaneous were irrigated. Soft tissues were infiltrated with local anesthetic. 2 grams of tranexamic acid with 0.4 mL of epi given topically in the wound. Dilute betadine (17mL:1000mL of saline) was used to irrigate the wound followed by a rinse with the pulse lavage with normal saline. Skin and subcutaneous were closed in a standard fashion with 2-0 vicryl and 3-0 monocryl followed by Dermabond. An aquacel dressing was applied. Sita Tatum PA-C was critical throughout the case to assist with positioning, retraction, instrument manipulation, and wound closure. Please note that no intern, resident, or other hospital surgical staff was available to assist during this procedure. There were no complications. No specimen was sent. The procedure was a LEFT TOTAL HIP REPLACEMENT using a Depuy total hip construct. The patient was transferred to the recovery room in stable condition. An AP pelvis xray was ordered to be completed in the PACU.      Brenden Gregory MD

## 2021-05-12 NOTE — ANESTHESIA PREPROCEDURE EVALUATION
Relevant Problems   RESPIRATORY SYSTEM   (+) Sleep apnea      NEUROLOGY   (+) Bipolar affective disorder (HCC)   (+) Bipolar disorder (HCC)      CARDIOVASCULAR   (+) Essential hypertension      ENDOCRINE   (+) Hypothyroidism       Anesthetic History   No history of anesthetic complications            Review of Systems / Medical History  Patient summary reviewed, nursing notes reviewed and pertinent labs reviewed    Pulmonary  Within defined limits      Sleep apnea           Neuro/Psych   Within defined limits           Cardiovascular  Within defined limits  Hypertension                   GI/Hepatic/Renal  Within defined limits   GERD           Endo/Other  Within defined limits    Hypothyroidism  Arthritis     Other Findings              Physical Exam    Airway  Mallampati: II  TM Distance: > 6 cm  Neck ROM: normal range of motion   Mouth opening: Normal     Cardiovascular  Regular rate and rhythm,  S1 and S2 normal,  no murmur, click, rub, or gallop             Dental  No notable dental hx       Pulmonary  Breath sounds clear to auscultation               Abdominal  GI exam deferred       Other Findings            Anesthetic Plan    ASA: 2  Anesthesia type: spinal          Induction: Intravenous  Anesthetic plan and risks discussed with: Patient

## 2021-05-12 NOTE — H&P
Date of Surgery Update:  Verner Furlong was seen and examined. History and physical has been reviewed. The patient has been examined. There have been no significant clinical changes since the completion of the originally dated History and Physical.  Patient identified by surgeon; surgical site was confirmed by patient and surgeon.     Signed By: Leonela Quinonez MD     May 12, 2021 7:44 AM

## 2021-05-13 ENCOUNTER — HOME HEALTH ADMISSION (OUTPATIENT)
Dept: HOME HEALTH SERVICES | Facility: HOME HEALTH | Age: 71
End: 2021-05-13
Payer: MEDICARE

## 2021-05-13 LAB
ANION GAP SERPL CALC-SCNC: 6 MMOL/L (ref 5–15)
BUN SERPL-MCNC: 13 MG/DL (ref 6–20)
BUN/CREAT SERPL: 11 (ref 12–20)
CALCIUM SERPL-MCNC: 9.4 MG/DL (ref 8.5–10.1)
CHLORIDE SERPL-SCNC: 109 MMOL/L (ref 97–108)
CO2 SERPL-SCNC: 23 MMOL/L (ref 21–32)
CREAT SERPL-MCNC: 1.22 MG/DL (ref 0.7–1.3)
GLUCOSE SERPL-MCNC: 156 MG/DL (ref 65–100)
HGB BLD-MCNC: 12.2 G/DL (ref 12.1–17)
POTASSIUM SERPL-SCNC: 4.1 MMOL/L (ref 3.5–5.1)
SODIUM SERPL-SCNC: 138 MMOL/L (ref 136–145)

## 2021-05-13 PROCEDURE — 97165 OT EVAL LOW COMPLEX 30 MIN: CPT

## 2021-05-13 PROCEDURE — 36415 COLL VENOUS BLD VENIPUNCTURE: CPT

## 2021-05-13 PROCEDURE — 97530 THERAPEUTIC ACTIVITIES: CPT

## 2021-05-13 PROCEDURE — 85018 HEMOGLOBIN: CPT

## 2021-05-13 PROCEDURE — 97535 SELF CARE MNGMENT TRAINING: CPT

## 2021-05-13 PROCEDURE — 74011250636 HC RX REV CODE- 250/636: Performed by: PHYSICIAN ASSISTANT

## 2021-05-13 PROCEDURE — 97116 GAIT TRAINING THERAPY: CPT

## 2021-05-13 PROCEDURE — 80048 BASIC METABOLIC PNL TOTAL CA: CPT

## 2021-05-13 PROCEDURE — 74011000250 HC RX REV CODE- 250: Performed by: PHYSICIAN ASSISTANT

## 2021-05-13 PROCEDURE — 74011250637 HC RX REV CODE- 250/637: Performed by: PHYSICIAN ASSISTANT

## 2021-05-13 RX ORDER — GUAIFENESIN 100 MG/5ML
81 LIQUID (ML) ORAL 2 TIMES DAILY
Qty: 60 TAB | Refills: 0 | Status: SHIPPED | OUTPATIENT
Start: 2021-05-13 | End: 2021-11-09

## 2021-05-13 RX ORDER — NALOXONE HYDROCHLORIDE 4 MG/.1ML
SPRAY NASAL
Qty: 1 EACH | Refills: 0 | Status: SHIPPED | OUTPATIENT
Start: 2021-05-13 | End: 2022-09-19

## 2021-05-13 RX ORDER — OXYCODONE HYDROCHLORIDE 5 MG/1
5 TABLET ORAL
Qty: 42 TAB | Refills: 0 | Status: SHIPPED | OUTPATIENT
Start: 2021-05-13 | End: 2021-05-23

## 2021-05-13 RX ORDER — TRAMADOL HYDROCHLORIDE 50 MG/1
50 TABLET ORAL
Qty: 42 TAB | Refills: 0 | Status: SHIPPED | OUTPATIENT
Start: 2021-05-13 | End: 2021-05-28

## 2021-05-13 RX ADMIN — ASPIRIN 81 MG: 81 TABLET, COATED ORAL at 19:45

## 2021-05-13 RX ADMIN — ACETAMINOPHEN 650 MG: 325 TABLET ORAL at 14:38

## 2021-05-13 RX ADMIN — QUETIAPINE FUMARATE 200 MG: 100 TABLET ORAL at 21:41

## 2021-05-13 RX ADMIN — TRAMADOL HYDROCHLORIDE 50 MG: 50 TABLET ORAL at 07:05

## 2021-05-13 RX ADMIN — LAMOTRIGINE 150 MG: 100 TABLET ORAL at 21:41

## 2021-05-13 RX ADMIN — DOCUSATE SODIUM 50 MG AND SENNOSIDES 8.6 MG 1 TABLET: 8.6; 5 TABLET, FILM COATED ORAL at 17:51

## 2021-05-13 RX ADMIN — ACETAMINOPHEN 650 MG: 325 TABLET ORAL at 19:45

## 2021-05-13 RX ADMIN — ACETAMINOPHEN 650 MG: 325 TABLET ORAL at 07:05

## 2021-05-13 RX ADMIN — CEFAZOLIN SODIUM 2 G: 1 INJECTION, POWDER, FOR SOLUTION INTRAMUSCULAR; INTRAVENOUS at 01:22

## 2021-05-13 RX ADMIN — ASPIRIN 81 MG: 81 TABLET, COATED ORAL at 07:05

## 2021-05-13 RX ADMIN — GABAPENTIN 300 MG: 300 CAPSULE ORAL at 21:41

## 2021-05-13 RX ADMIN — LAMOTRIGINE 150 MG: 100 TABLET ORAL at 09:05

## 2021-05-13 RX ADMIN — LITHIUM CARBONATE 450 MG: 450 TABLET, EXTENDED RELEASE ORAL at 09:05

## 2021-05-13 RX ADMIN — LITHIUM CARBONATE 450 MG: 450 TABLET, EXTENDED RELEASE ORAL at 17:51

## 2021-05-13 RX ADMIN — LEVOTHYROXINE SODIUM 150 MCG: 0.15 TABLET ORAL at 07:05

## 2021-05-13 RX ADMIN — FAMOTIDINE 20 MG: 20 TABLET, FILM COATED ORAL at 17:51

## 2021-05-13 RX ADMIN — DOCUSATE SODIUM 50 MG AND SENNOSIDES 8.6 MG 1 TABLET: 8.6; 5 TABLET, FILM COATED ORAL at 09:05

## 2021-05-13 RX ADMIN — FAMOTIDINE 20 MG: 20 TABLET, FILM COATED ORAL at 09:05

## 2021-05-13 RX ADMIN — OXYCODONE HYDROCHLORIDE 5 MG: 5 TABLET ORAL at 01:23

## 2021-05-13 RX ADMIN — OXYCODONE HYDROCHLORIDE 5 MG: 5 TABLET ORAL at 09:12

## 2021-05-13 RX ADMIN — POLYETHYLENE GLYCOL 3350 17 G: 17 POWDER, FOR SOLUTION ORAL at 09:05

## 2021-05-13 RX ADMIN — ACETAMINOPHEN 650 MG: 325 TABLET ORAL at 01:22

## 2021-05-13 NOTE — PROGRESS NOTES
OCCUPATIONAL THERAPY EVALUATION/DISCHARGE  Patient: Huma Bhatt (10 y.o. male)  Date: 5/13/2021  Primary Diagnosis: Osteoarthritis of left hip, unspecified osteoarthritis type [M16.12]  Procedure(s) (LRB):  LEFT TOTAL HIP ARTHROPLASTY ANTERIOR APPROACH (Left) 1 Day Post-Op   Precautions:   Fall, WBAT    ASSESSMENT  Based on the objective data described below, the patient presents with fair functional mobility with RW and mild decrease with ADL independence s/p POD1 L THR, anterior. Currently limited by stiffness and pain of R LE. Patient up into bathroom and standing at sink with overall SBA, extended time required for ADLs with retraining completed. No other OT needs, recommend return home with wife to 3rd story appt if cleared from stairs. Current Level of Function (ADLs/self-care): set up to SBA AE for LB ADLs, SBA with RW for functional mobility    Functional Outcome Measure: The patient scored 70/100 on the barthel adl index outcome measure which is indicative of  Mild impairment from baseline. Other factors to consider for discharge: 3rd story appt stairs. PLAN :  Recommendation for discharge: (in order for the patient to meet his/her long term goals)  No skilled occupational therapy/ follow up rehabilitation needs identified at this time. This discharge recommendation:  A follow-up discussion with the attending provider and/or case management is planned    IF patient discharges home will need the following DME: patient owns DME required for discharge       SUBJECTIVE:   Patient stated I have an essential tremor.     OBJECTIVE DATA SUMMARY:   HISTORY:   Past Medical History:   Diagnosis Date    Arthritis     Bipolar affective disorder ([facility] [address]     Deviated nasal septum 12/14/2019    ED (erectile dysfunction)     Essential hypertension 5/3/2021    GERD (gastroesophageal reflux disease)     Gout     Hyperlipidemia 8/5/2011    Hypothyroidism     Inguinal hernia 11/27/2012    Rupture Achilles tendon     left; resolved.  Sleep apnea 8/31/2011    does not use CPAP/uses dental appliance     Past Surgical History:   Procedure Laterality Date    COLONOSCOPY N/A 5/22/2018    COLONOSCOPY performed by Katarzyna Mcadams MD at [address] HX COLONOSCOPY  10/13/14    polypectomy, f/u 3 y . dr. Norma Lugo Right     tendon repair of thumb    HX OTHER SURGICAL  2015    right inguinal hernia repair    HX OTHER SURGICAL      colonoscopy - hemorrhoids    HX TONSILLECTOMY  1960's       Prior Level of Function/Environment/Context: lives with wife, mod I   Expanded or extensive additional review of patient history:     Home Situation  Home Environment: Apartment  # Steps to Enter: 39  Rails to Enter: Yes  Hand Rails : Left  One/Two Story Residence: One story  Living Alone: No  Support Systems: Spouse/Significant Other/Partner  Patient Expects to be Discharged to[de-identified] Apartment  Current DME Used/Available at Home: Raised toilet seat, Walker, rolling, Cane, straight  Tub or Shower Type: Tub    Hand dominance: Right    EXAMINATION OF PERFORMANCE DEFICITS:  Cognitive/Behavioral Status:          alert             Skin: intact, bandage intact    Edema: L hip area    Hearing:       Vision/Perceptual:                           Acuity: Within Defined Limits;Able to read employee name badge without difficulty         Range of Motion:  B UE  AROM: Within functional limits                         Strength:  B UE  Strength: Within functional limits                Coordination:  Coordination: Within functional limits  Fine Motor Skills-Upper: Left Intact; Right Intact         Tone & Sensation:  B UE  Tone: Normal  Sensation: Intact                      Balance:  Sitting: Intact  Standing: Intact; With support(RW)    Functional Mobility and Transfers for ADLs:  Bed Mobility:  Rolling: Stand-by assistance  Supine to Sit: Stand-by assistance  Sit to Supine: Stand-by assistance    Transfers:  Sit to Stand: Stand-by assistance  Stand to Sit: Stand-by assistance  Bed to Chair: Stand-by assistance  Bathroom Mobility: Stand-by assistance  Toilet Transfer : Stand-by assistance    ADL Assessment:  The patient recalled and demonstrated hip contraindications Left LE during ADLs and functional mobility with verbal cues. Feeding: Independent    Oral Facial Hygiene/Grooming: Setup    Bathing: Setup    Upper Body Dressing: Setup    Lower Body Dressing: Setup; Additional time; Adaptive equipment    Toileting: Stand by assistance; Additional time                ADL Intervention and task modifications:  Feeding  Feeding Assistance: Independent    Grooming  Position Performed: Standing  Washing Hands: Stand-by assistance              Upper Body Dressing Assistance  Pullover Shirt: Set-up    Lower Body Dressing Assistance  Underpants: Set-up  Socks: Minimum assistance  Position Performed: Seated in chair  Cues: Don;Visual/perceptual training/retraining    Toileting  Toileting Assistance: Set-up  Bladder Hygiene: Set-up  Clothing Management: Stand-by assistance         Bathing: Patient instructed when bathing to not submerge wound in water, stand to shower or sponge bathe, cover wound with plastic and tape to ensure no water reaches bandage/wound without cues. Patient indicated understanding. Dressing joint: Patient instructed and demonstrated to don/doff Left LE first/last verbal cues. Patient instructed and demonstrated to don all clothing while sitting prior to standing, doff all clothing to knees while standing, then sit to doff clothing off from knees to feet in order to facilitate fall prevention, pain management, and energy conservation with Stand-by assistance. Home safety: Patient instructed on home modifications and safety (raise height of ADL objects, appropriate height of chair surfaces, recliner safety, change of floor surfaces, clear pathways) to increase independence and fall prevention.   Patient indicated understanding. Standing: Patient instructed and demonstrated to walk up to sink/counter top/surfaces, step into walker to increase safety of joint and fall prevention with Stand-by assistance. Instructed to apply concept of hip contraindications to ADLs within the home (no extreme reaching across body to Right side, square off while using objects, slide objects along surfaces). Patient instructed to increase amount of time standing, observe standing position during ADLs in order to increase even weight bearing through bilateral LEs in order to increase independence with ADLs. Goal to be reached 30 days post - op, per orthopedic surgeon or per PT. Patient indicated understanding. Tub transfer: Patient instructed regarding when it is safe to begin transfer into tub (complete stairs with PT, advance exercises with PT high enough to clear tub height). Patient instructed to use the same technique as used with stairs when entering and exiting tub (\"up with the non-surgical, down with the surgical leg\"). Patient indicated understanding. Therapeutic Exercise:     Functional Measure:  Barthel Index:    Bathin  Bladder: 10  Bowels: 10  Groomin  Dressin  Feeding: 10  Mobility: 10  Stairs: 5  Toilet Use: 5  Transfer (Bed to Chair and Back): 10  Total: 70/100        The Barthel ADL Index: Guidelines  1. The index should be used as a record of what a patient does, not as a record of what a patient could do. 2. The main aim is to establish degree of independence from any help, physical or verbal, however minor and for whatever reason. 3. The need for supervision renders the patient not independent. 4. A patient's performance should be established using the best available evidence. Asking the patient, friends/relatives and nurses are the usual sources, but direct observation and common sense are also important. However direct testing is not needed.   5. Usually the patient's performance over the preceding 24-48 hours is important, but occasionally longer periods will be relevant. 6. Middle categories imply that the patient supplies over 50 per cent of the effort. 7. Use of aids to be independent is allowed. Michelle Mai., Barthel, D.W. ([id number]). Functional evaluation: the Barthel Index. [address] [facility] (14)2. MILES Hearn, Rubina Virgen, Edgar Mcgrath, Paulo, [address] [facility] (). Measuring the change indisability after inpatient rehabilitation; comparison of the responsiveness of the Barthel Index and Functional [place] Measure. Journal of Neurology, Neurosurgery, and Psychiatry, 66(4), 647-969. Iggy Hernandez, N.J.A, TRISTAN Brady, & Bassam Savage MADRIANA. (2004.) Assessment of post-stroke quality of life in cost-effectiveness studies: The usefulness of the Barthel Index and the EuroQoL-5D. Quality of Life Research, 15, 601-13         Occupational Therapy Evaluation Charge Determination   History Examination Decision-Making   LOW Complexity : Brief history review  LOW Complexity : 1-3 performance deficits relating to physical, cognitive , or psychosocial skils that result in activity limitations and / or participation restrictions  LOW Complexity : No comorbidities that affect functional and no verbal or physical assistance needed to complete eval tasks       Based on the above components, the patient evaluation is determined to be of the following complexity level: LOW   Pain Ratin/10 L LE, walking    Activity Tolerance:   Fair and requires rest breaks      After treatment patient left in no apparent distress:    Sitting in chair and Call bell within reach    COMMUNICATION/EDUCATION:   The patients plan of care was discussed with: Physical therapist and Registered nurse.      Thank you for this referral.  Star Pelaez, OT  Time Calculation: 25 mins

## 2021-05-13 NOTE — PROGRESS NOTES
ASHLEY: Plan for discharge home with [facility]. [facility] has accepted patient for home health. [facility] [employer] plans to deliver a tall rolling walker to the room prior to discharge. Family to transport home via car at discharge. CM received notification from [place] that the Patient has $50 OOP cost for rolling walker. CM notified patient and he is ok with cost.    Full assessment to follow.     FARZANA Anders/CRM

## 2021-05-13 NOTE — PROGRESS NOTES
Problem: Mobility Impaired (Adult and Pediatric)  Goal: *Acute Goals and Plan of Care (Insert Text)  Description: FUNCTIONAL STATUS PRIOR TO ADMISSION: Patient was independent and active without use of DME.    HOME SUPPORT PRIOR TO ADMISSION: The patient lived with wife but did not require assist.    Physical Therapy Goals  Initiated 5/12/2021    1. Patient will move from supine to sit and sit to supine  and roll side to side in bed with modified independence within 4 days. 2. Patient will perform sit to stand with modified independence within 4 days. 3. Patient will ambulate with modified independence for 150 feet with the least restrictive device within 4 days. 4. Patient will ascend/descend 8 stairs with 1 handrail(s) with modified independence within 4 days. 5. Patient will perform ANNA home exercise program per protocol with modified independence within 4 days. Outcome: Progressing Towards Goal    PHYSICAL THERAPY MORNING SESSION NOTE  Patient: Corrinne Jubilee (51 y.o. male)  Date: 5/13/2021  Primary Diagnosis: Osteoarthritis of left hip, unspecified osteoarthritis type [M16.12]  Procedure(s) (LRB):  LEFT TOTAL HIP ARTHROPLASTY ANTERIOR APPROACH (Left) 1 Day Post-Op   Precautions:   Fall, WBAT    Corrinne Jubilee was seen for morning PT session. He is walking 100 feet at CGA level of assistance and with a RW . The primary limiting factors are pain, gait dysfunction, and decreased safety awareness. Currently, expect Corrinne Jubilee will need 2-3 more session(s) to meet the therapy goals in preparation for returning home. The full therapy note will follow after this afternoon's session. Morning session functional mobility:  Bed Mobility:        Sit to Supine: Stand-by assistance  Increased time to complete for L LE management     Transfers:  Sit to Stand: Contact guard assistance;Minimum assistance  Cues to push to stand rather than pull at walker.  Performed from chair and w/c x 2  Stand to Sit: Contact guard assistance      Cues for UE placement and controlled lower. Completed to w/c x 2 and to bed. Bed to Chair: Contact guard assistance  Cues needed for proper technique especially to prevent stepping outside of walker as well as to prevent abandoning walker prior to completing transfer. Performed to w/c x 2 and bed x 1              Balance:   Sitting: Intact; Without support  Standing: Intact; With support(RW)  Ambulation/Gait Training:  Distance (ft): 100 Feet (ft)  Assistive Device: Gait belt;Walker, rolling  Ambulation - Level of Assistance: Contact guard assistance;Assist x1        Gait Abnormalities: Antalgic;Decreased step clearance; Step to gait  Right Side Weight Bearing: Full  Left Side Weight Bearing: As tolerated  Base of Support: Shift to right  Stance: Left decreased  Speed/Nico: Slow  Step Length: Left shortened;Right shortened  Swing Pattern: Left asymmetrical     Interventions: Safety awareness training; Tactile cues; Verbal cues      Patient required cues to attend forward and for upright posture within base of walker. Slow nico, increased pain, heavy reliance on Ues. Stairs:  Number of Stairs Trained: 8  Stairs - Level of Assistance: Contact guard assistance  Rail Use: Both    Step to pattern. Instructed in L LE protection technique which patient was already versed. Attempted to utilize one handrail with B UE support however patient unable to complete due to pain.      Thank you for this referral.  Theo Yarbrough   Time Calculation: 29 mins

## 2021-05-13 NOTE — PROGRESS NOTES
Problem: Mobility Impaired (Adult and Pediatric)  Goal: *Acute Goals and Plan of Care (Insert Text)  Description: FUNCTIONAL STATUS PRIOR TO ADMISSION: Patient was independent and active without use of DME.    HOME SUPPORT PRIOR TO ADMISSION: The patient lived with wife but did not require assist.    Physical Therapy Goals  Initiated 5/12/2021    1. Patient will move from supine to sit and sit to supine  and roll side to side in bed with modified independence within 4 days. 2. Patient will perform sit to stand with modified independence within 4 days. 3. Patient will ambulate with modified independence for 150 feet with the least restrictive device within 4 days. 4. Patient will ascend/descend 8 stairs with 1 handrail(s) with modified independence within 4 days. 5. Patient will perform ANNA home exercise program per protocol with modified independence within 4 days. 5/13/2021 1055 by Rehana Valdovinos  Outcome: Progressing Towards Goal    PHYSICAL THERAPY TREATMENT  Patient: Danielle Ham (15 y.o. male)  Date: 5/13/2021  Diagnosis: Osteoarthritis of left hip, unspecified osteoarthritis type [M16.12] <principal problem not specified>  Procedure(s) (LRB):  LEFT TOTAL HIP ARTHROPLASTY ANTERIOR APPROACH (Left) 1 Day Post-Op  Precautions: Fall, WBAT  Chart, physical therapy assessment, plan of care and goals were reviewed. ASSESSMENT  Patient continues with skilled PT services and is progressing towards goals. Mr. Yahaira Landis tolerated pm session well. Improved fluidity of gait and mobility tolerance as compared to earlier. He was able to perform bed mobility and transfers at SBA level and ambulated with CGA-SBA with RW. He was able to negotiate 4 steps with one rail at [facility]. He continues to present with increased pain, gait dysfunction, L LE weakness, and balance impairment. Anticipate he will need 1-3 additional acute PT visits to ensure safe return home with his wife.      Current Level of Function Impacting Discharge (mobility/balance): CGA-SBA    Other factors to consider for discharge: 39 [address] with one rail to apartment, supportive wife         PLAN :  Patient continues to benefit from skilled intervention to address the above impairments. Continue treatment per established plan of care. to address goals. Recommendation for discharge: (in order for the patient to meet his/her long term goals)  Physical therapy at least 2 days/week in the home AND ensure assist and/or supervision for safety with for all mobility tasks    This discharge recommendation:  Has been made in collaboration with the attending provider and/or case management    IF patient discharges home will need the following DME: rolling walker (care management order done)       SUBJECTIVE:   Patient stated I am still in pain but I can do what we need to do.     OBJECTIVE DATA SUMMARY:   Critical Behavior:  Neurologic State: Alert  Orientation Level: Oriented X4  Cognition: Appropriate decision making, Appropriate for age attention/concentration, Appropriate safety awareness       Range of Motion:  AROM: Within functional limits                         Functional Mobility Training:  Bed Mobility:  Rolling: Stand-by assistance  Supine to Sit: Stand-by assistance  Sit to Supine: Stand-by assistance  Scooting: Stand-by assistance  Level of Assistance: Stand-by assistance  Interventions: Verbal cues  Transfers:  Sit to Stand: Stand-by assistance  Cues to push to stand rather than pull at walker. From bed x 2 and commode. Stand to Sit: Stand-by assistance        Bed to Chair: Stand-by assistance              Interventions: Verbal cues  Level of Assistance: Stand-by assistance  Balance:  Sitting: Intact  Standing: Intact; With support(RW)  Ambulation/Gait Training:  Distance (ft): 100 Feet (ft)  Assistive Device: Gait belt;Walker, rolling  Ambulation - Level of Assistance: Contact guard assistance;Stand-by assistance        Gait Abnormalities: Antalgic;Decreased step clearance  Right Side Weight Bearing: Full  Left Side Weight Bearing: As tolerated  Base of Support: Shift to right  Stance: Left decreased  Speed/Nico: Slow  Step Length: Left shortened;Right shortened  Swing Pattern: Left asymmetrical     Interventions: Manual cues; Tactile cues; Verbal cues         Much improved nico than am session. Able to achieve step through pattern. Cues for upright posture  Stairs:  Number of Stairs Trained: 4  Stairs - Level of Assistance: Contact guard assistance;Assist X1   Rail Use: Left   Able to negotiate steps with B UE on one rail on L. Cues needed for sequencing.      Therapeutic Exercises:     EXERCISE   Sets   Reps   Active Active Assist   Passive Self ROM   Comments   Ankle Pumps   [x]                                        []                                        []                                        []                                           Quad Sets   [x]                                        []                                        []                                        []                                           Hamstring Sets   []                                        []                                        []                                        []                                           Short Arc Quads   []                                        []                                        []                                        []                                           Knee Extension Stretch     []                                          []                                          []                                          []                                           Heel Slides   [x]                                        []                                        []                                        []                                           Long Arc Quads   []                                        [] []                                        []                                           Knee Flexion Stretch   []                                        []                                        []                                        []                                              [x]       Straight Leg Raises   []                                        [x]                                        []                                        []                                               Pain Ratin/10 in L hip    Activity Tolerance:   Good    After treatment patient left in no apparent distress:   Supine in bed, Call bell within reach, Bed / chair alarm activated, Caregiver / family present, and Side rails x 3    COMMUNICATION/COLLABORATION:   The patients plan of care was discussed with: Registered nurse.      Nancy Jhaveri DPT   Time Calculation: 23 mins

## 2021-05-13 NOTE — PROGRESS NOTES
Resting comfortably. Some pain overnight, improving. GEN:  NAD.  AOx3   ABD:  S/NT/ND   LLE:  Dressing C/D/I , 5/5 motor, Calf nttp (Bilat), Sensation grossly intact to light touch throughout, 1+ dp/pt pulses, foot perfused    Patient Vitals for the past 24 hrs:   Temp Pulse Resp BP SpO2   05/13/21 0355 97.7 °F (36.5 °C) 65 16 (!) 100/51 100 %   05/13/21 0124 99.8 °F (37.7 °C) 85 16 (!) 121/59 98 %   05/12/21 2108 97.6 °F (36.4 °C) 75 16 (!) 170/68 97 %   05/12/21 1455 97.4 °F (36.3 °C) (!) 57 16 (!) 160/61 98 %   05/12/21 1403 97.4 °F (36.3 °C) (!) 56 16 (!) 174/68 100 %   05/12/21 1349 -- (!) 59 -- (!) 167/63 --   05/12/21 1344 -- (!) 58 -- (!) 169/69 --   05/12/21 1256 97.4 °F (36.3 °C) (!) 56 16 (!) 169/74 99 %   05/12/21 1204 97.4 °F (36.3 °C) (!) 56 16 (!) 125/53 99 %   05/12/21 1137 97.4 °F (36.3 °C) -- -- -- --   05/12/21 1130 -- (!) 56 13 (!) 121/51 100 %   05/12/21 1115 -- (!) 57 15 (!) 114/50 100 %   05/12/21 1110 -- (!) 57 14 (!) 109/48 100 %   05/12/21 1105 -- (!) 59 20 (!) 105/50 100 %   05/12/21 1101 -- 71 22 (!) 95/48 100 %   05/12/21 1100 -- 62 14 (!) 94/48 100 %   05/12/21 1055 -- (!) 58 14 (!) 101/50 100 %   05/12/21 1052 98.2 °F (36.8 °C) 69 14 (!) 104/45 99 %   05/12/21 1051 98.2 °F (36.8 °C) -- -- (!) 104/45 --   05/12/21 0732 97.7 °F (36.5 °C) 60 16 (!) 155/58 99 %       Current Facility-Administered Medications   Medication Dose Route Frequency    amLODIPine (NORVASC) tablet 5 mg  5 mg Oral DAILY    gabapentin (NEURONTIN) capsule 300 mg  300 mg Oral QHS    lamoTRIgine (LaMICtal) tablet 150 mg  150 mg Oral Q12H    levothyroxine (SYNTHROID) tablet 150 mcg  150 mcg Oral 6am    lithium carbonate CR (ESKALITH CR) tablet 450 mg  450 mg Oral BID    QUEtiapine (SEROquel) tablet 200 mg  200 mg Oral QHS    0.9% sodium chloride infusion  125 mL/hr IntraVENous CONTINUOUS    sodium chloride 0.9 % bolus infusion 500 mL  500 mL IntraVENous ONCE PRN    sodium chloride (NS) flush 5-40 mL  5-40 mL IntraVENous Q8H    sodium chloride (NS) flush 5-40 mL  5-40 mL IntraVENous PRN    acetaminophen (TYLENOL) tablet 650 mg  650 mg Oral Q6H    oxyCODONE IR (ROXICODONE) tablet 5 mg  5 mg Oral Q3H PRN    HYDROmorphone (PF) (DILAUDID) injection 0.5 mg  0.5 mg IntraVENous Q4H PRN    naloxone (NARCAN) injection 0.4 mg  0.4 mg IntraVENous PRN    ondansetron (ZOFRAN) injection 4 mg  4 mg IntraVENous Q4H PRN    hydrOXYzine HCL (ATARAX) tablet 10 mg  10 mg Oral Q8H PRN    famotidine (PEPCID) tablet 20 mg  20 mg Oral BID    senna-docusate (PERICOLACE) 8.6-50 mg per tablet 1 Tab  1 Tab Oral BID    polyethylene glycol (MIRALAX) packet 17 g  17 g Oral DAILY    bisacodyL (DULCOLAX) suppository 10 mg  10 mg Rectal DAILY PRN    aspirin delayed-release tablet 81 mg  81 mg Oral Q12H    traMADoL (ULTRAM) tablet 50 mg  50 mg Oral Q6H PRN       Lab Results   Component Value Date/Time    HGB 12.2 05/13/2021 01:52 AM    INR 1.0 05/05/2021 09:35 AM       Lab Results   Component Value Date/Time    Sodium 138 05/13/2021 01:52 AM    Potassium 4.1 05/13/2021 01:52 AM    Chloride 109 (H) 05/13/2021 01:52 AM    CO2 23 05/13/2021 01:52 AM    BUN 13 05/13/2021 01:52 AM    Creatinine 1.22 05/13/2021 01:52 AM    Calcium 9.4 05/13/2021 01:52 AM            66 y.o. male s/p left direct anterior total hip arthroplasty on 5/12/2021  . Doing well.      Precautions: None  ABX: Complete 24 hours perioperative abx  PATHWAY: Straight cath per protocol  DVT Prophylaxis: ASA 81 mg enteric coated BID  Weight Bearing: WBAT LLE   Pain Control: Toradol, Tylenol, 15 mg meloxicam to begin evening POD 1 if patient still in hospital, tramadol every 6 hours, oxy 5 mg for breakthrough pain  Disposition: Home vs tomorrow once cleared by PT, HHPT

## 2021-05-13 NOTE — PROGRESS NOTES
Update: Seen for OT, cleared for home with wife, no other OT needs. Occupational Therapy: defer AM    Patient requesting to rest post PT AM session, will follow up around lunch for OT. Kvng Sanches.  MS Latanya OTR/L

## 2021-05-13 NOTE — PROGRESS NOTES
Bedside shift change report given to Anne Plasencia RN (oncoming nurse) by Coni Johnson and Rayray Coles RN (offgoing nurse). Report included the following information SBAR, Kardex, Procedure Summary, Intake/Output, MAR and Recent Results.

## 2021-05-14 VITALS
TEMPERATURE: 98.4 F | RESPIRATION RATE: 16 BRPM | SYSTOLIC BLOOD PRESSURE: 131 MMHG | BODY MASS INDEX: 23.3 KG/M2 | DIASTOLIC BLOOD PRESSURE: 65 MMHG | WEIGHT: 187.38 LBS | OXYGEN SATURATION: 97 % | HEART RATE: 75 BPM | HEIGHT: 75 IN

## 2021-05-14 PROCEDURE — 74011250637 HC RX REV CODE- 250/637: Performed by: PHYSICIAN ASSISTANT

## 2021-05-14 PROCEDURE — 97530 THERAPEUTIC ACTIVITIES: CPT

## 2021-05-14 PROCEDURE — 97116 GAIT TRAINING THERAPY: CPT

## 2021-05-14 RX ORDER — AMOXICILLIN 250 MG
1 CAPSULE ORAL 2 TIMES DAILY
Qty: 30 TAB | Refills: 0 | Status: SHIPPED | OUTPATIENT
Start: 2021-05-14 | End: 2021-11-09

## 2021-05-14 RX ADMIN — AMLODIPINE BESYLATE 5 MG: 5 TABLET ORAL at 08:38

## 2021-05-14 RX ADMIN — ACETAMINOPHEN 650 MG: 325 TABLET ORAL at 07:04

## 2021-05-14 RX ADMIN — LEVOTHYROXINE SODIUM 150 MCG: 0.15 TABLET ORAL at 07:04

## 2021-05-14 RX ADMIN — ASPIRIN 81 MG: 81 TABLET, COATED ORAL at 07:04

## 2021-05-14 RX ADMIN — FAMOTIDINE 20 MG: 20 TABLET, FILM COATED ORAL at 08:38

## 2021-05-14 RX ADMIN — DOCUSATE SODIUM 50 MG AND SENNOSIDES 8.6 MG 1 TABLET: 8.6; 5 TABLET, FILM COATED ORAL at 08:38

## 2021-05-14 RX ADMIN — LAMOTRIGINE 150 MG: 100 TABLET ORAL at 08:38

## 2021-05-14 RX ADMIN — LITHIUM CARBONATE 450 MG: 450 TABLET, EXTENDED RELEASE ORAL at 08:38

## 2021-05-14 RX ADMIN — TRAMADOL HYDROCHLORIDE 50 MG: 50 TABLET ORAL at 07:04

## 2021-05-14 RX ADMIN — ACETAMINOPHEN 650 MG: 325 TABLET ORAL at 02:03

## 2021-05-14 NOTE — PROGRESS NOTES
Problem: Mobility Impaired (Adult and Pediatric)  Goal: *Acute Goals and Plan of Care (Insert Text)  Description: FUNCTIONAL STATUS PRIOR TO ADMISSION: Patient was independent and active without use of DME.    HOME SUPPORT PRIOR TO ADMISSION: The patient lived with wife but did not require assist.    Physical Therapy Goals  Initiated 5/12/2021    1. Patient will move from supine to sit and sit to supine  and roll side to side in bed with modified independence within 4 days. 2. Patient will perform sit to stand with modified independence within 4 days. 3. Patient will ambulate with modified independence for 150 feet with the least restrictive device within 4 days. 4. Patient will ascend/descend 8 stairs with 1 handrail(s) with modified independence within 4 days. 5. Patient will perform ANNA home exercise program per protocol with modified independence within 4 days. Outcome: Progressing Towards Goal   PHYSICAL THERAPY NOTE  Patient: Paramjit Woodruff (72 y.o. male)  Date: 5/14/2021  Primary Diagnosis: Osteoarthritis of left hip, unspecified osteoarthritis type [M16.12]  Procedure(s) (LRB):  LEFT TOTAL HIP ARTHROPLASTY ANTERIOR APPROACH (Left) 2 Days Post-Op   Precautions: Fall, WBAT    Paramjit Woodruff was seen for morning PT session. He is walking feet with 120 feet and with the RW. Patient was instructed in stair management and able to negotiate 4 stairs using the left rail and SPC in opposite hand, CGA. Reviewed activity recommendations and restrictions with patient. Continue to recommend HHPT at d/c; RW has been delivered to room. D/c video started for pt and his wife. Paramjit Woodruff is clear for discharge home from a mobility standpoint and RN is aware.      Morning session functional mobility:  Bed Mobility:  Rolling: Stand-by assistance  Supine to Sit: Stand-by assistance  Sit to Supine: Stand-by assistance     Transfers:  Sit to Stand: Contact guard assistance  Stand to Sit: Stand-by assistance                       Balance:   Sitting: Intact  Standing: Intact; With support  Ambulation/Gait Training:  Distance (ft): 120 Feet (ft)  Assistive Device: Gait belt;Walker, rolling  Ambulation - Level of Assistance: Contact guard assistance;Assist x1        Gait Abnormalities: Antalgic;Decreased step clearance  Right Side Weight Bearing: Full  Left Side Weight Bearing: As tolerated  Base of Support: Narrowed  Stance: Left decreased  Speed/Nadine: Shuffled; Slow  Step Length: Left shortened;Right shortened  Swing Pattern: Left asymmetrical;Right asymmetrical     Interventions: Safety awareness training        Stairs:  Number of Stairs Trained: 4  Stairs - Level of Assistance: Contact guard assistance;Assist X1  Rail Use: Left (SPC in opposite hand)    Thank you,  Rachana Millan,PTA   Time Calculation: 27 mins

## 2021-05-14 NOTE — PROGRESS NOTES
Ortho Daily Progress Note    5/14/2021  9:57 AM    POD:  2 Days Post-Op  S/P:  Procedure(s):  LEFT TOTAL HIP ARTHROPLASTY ANTERIOR APPROACH    Afebrile/VSS, NAD, A&O x 3  Doing well without complaints of nausea  Pain well controlled  Calves soft/NTTP Bilaterally  Incision OK, no drainage or dehiscence. Thigh soft. Dressing clean and dry  Moving lower extremities well. Neurocirculatory exam intact and within normal range. Lab Results   Component Value Date/Time    HGB 12.2 05/13/2021 01:52 AM    INR 1.0 05/05/2021 09:35 AM     Recent Labs     05/13/21  0152 04/27/21  1403 03/11/21  0937 03/04/21  0934 08/18/20  0910   CREA 1.22 1.27 1.26 1.41* 1.14   BUN 13 17 19 20 15     Estimated Creatinine Clearance: 65.4 mL/min (based on SCr of 1.22 mg/dL).     PLAN:  DVT prophylaxis: ASA 81 mg bid  WBAT with PT-mobilization  Pain Control: Tylenol, toradol, tramadol, oxycodone  Plan to D/C home today      ALMA David

## 2021-05-14 NOTE — DISCHARGE INSTRUCTIONS
Discharge Instructions Hip Replacement  Dr. Priscilla So    Patient Name  Man Russell  Date of procedure  5/12/2021    Procedure  Procedure(s):  LEFT TOTAL HIP ARTHROPLASTY ANTERIOR APPROACH  Surgeon  Surgeon(s) and Role:     * Dewey Blank MD - Primary  Date of discharge: 5/14/21  PCP: Zakia Puente MD    Follow up care   Follow up visit with Dr. Priscilla So in 4 weeks. Call [phone] extension [phone]) to make an appointment.  If Home Health has been arranged for you, they will call you to arrange dates/times for visits. Call them if you do not hear from them within 24 hours after you go home. Activity at home   AVOID sudden and extreme movement of your hip (surgical leg)   Take a short walk every hour; except at night when sleeping.  Do your Home Exercise Program 3 times every day.  After exercising lie down and elevate your leg on pillows for 15-30 minutes to decrease swelling.  Refer to your patient notebook for more information. Bathing and caring for your incision   You may take a shower with your waterproof dressing on your hip.  The waterproof dressing is to stay on your hip for 7 days.  On the 7th day have someone gently peel the dressing off by lifting the edge and stretching it to break the seal.   You may then leave your incision open to air unless you see drainage from your hip. Preventing blood clots   Take Aspirin 81mg twice each day for one month (30 days) following surgery   Call Dr. Priscilla So for signs of a blood clot in your leg: calf pain, tenderness, redness, swelling of lower leg   Preventing lung congestion   Use your incentive spirometer 4 times a day; do 10 repetitions each time   Remember to keep the small blue ball between the two arrows when taking a slow, deep breath   Pain Management   Get up and walk a short distance to relieve pain and stiffness.  Place ice wrap on your hip except when you are walking.  The gel ice packs should be changed about every 4 hours.  Elevate your leg on pillows for 15-30 minutes. Pain Medications   Take Tylenol 650mg (take two 325mg tablets) every 6 hours for the next 4 weeks.  If needed, take Tramadol (narcotic pain pill) every 6 hours as prescribed.  If Tramadol has not relieved your pain within 1 hour, take Oxycodone 5mg (narcotic pain pill). Take Oxycodone only if needed. Stop taking once your pain is tolerable.  Take all medications with a small amount of food.  As your pain decreases, take the narcotics less often or take ½ of a pill.  Call Dr. Janette Villavicencio if you have side effects from your narcotic pain medication: itching, drowsiness, dizziness, upset stomach, dry mouth, constipation or if you medication is not relieving your pain. Diet after surgery   You may resume your normal diet. Include vegetables, fruit, whole grains, lean meats, and low-fat dairy products. Eat food high in fiber.  Drink plenty of fluids, including 8 cups of water daily Take a stool softener (Senokot-s or Colace) to prevent constipation. If constipation occurs you may take a laxative (Milk of Magnesia, Dulcolax tablets). Avoid after surgery   Do not take any over-the-counter medication for pain except Tylenol   Do not take more than 3000mg (3 Grams) of Tylenol in 24 hours   Do not drink alcoholic beverages   Do not smoke   Do not drive until seen for follow up appointment   Do not place frozen gel pack directly on your skin. It can cause frostbite.  Do not take a tub bath, swim or get in a hot tub for 8 weeks  Prevention of falls and safety at home   Set up an area where you can rest comfortably leaving space around furniture to allow you to walk with your walker.  Keep stairs, hallways and bathrooms well lit; especially at night.  Arrange for care for your pets.  Keep your home free of clutter.    Call Dr. Janette Villavicencio at [phone] for:   Pain that is not relieved by pain medication, ice and activity   Side effects of medications   Increased/spread of bruising   Warning signs of infection:  ? persistent fever greater than 100 degrees  ? shaking or chills  ? increased redness, tenderness, swelling or drainage from incision  ? increased pain during activity or rest   Warning signs of a blood clot in your leg:  ? increased pain in your calf  ? tenderness or redness  ? increased swelling or knee, calf, ankle or foot    Call [phone] after 5pm or on a weekend.  The on call physician will return your phone call  Call your Primary Care Doctor for:    Concerns about your medical conditions such as diabetes, high blood pressure, asthma, congestive heart failure   Blood sugars greater than 180   Persistent headache or dizziness   Coughing or congestion   Constipation or diarrhea   Burning when you go to the bathroom   Abnormal heart rate (fast or slow)      Call 111 and go to the nearest hospital for:    Sudden increased shortness of breath   Sudden onset of chest pain   Difficulty breathing   Localized chest pain with coughing or taking a deep breath

## 2021-05-14 NOTE — PROGRESS NOTES
Bedside shift change report given to YANET Quintana (oncoming nurse) by Catalina Lynne (offgoing nurse). Report included the following information SBAR, Kardex, Procedure Summary, Intake/Output, MAR and Recent Results.

## 2021-05-14 NOTE — PROGRESS NOTES
I have reviewed discharge instructions with the patient and spouse. The patient and spouse verbalized understanding.  No further questions

## 2021-05-15 ENCOUNTER — HOME CARE VISIT (OUTPATIENT)
Dept: SCHEDULING | Facility: HOME HEALTH | Age: 71
End: 2021-05-15
Payer: MEDICARE

## 2021-05-15 PROCEDURE — 400013 HH SOC

## 2021-05-15 PROCEDURE — 3331090001 HH PPS REVENUE CREDIT

## 2021-05-15 PROCEDURE — 400018 HH-NO PAY CLAIM PROCEDURE

## 2021-05-15 PROCEDURE — 3331090002 HH PPS REVENUE DEBIT

## 2021-05-15 PROCEDURE — G0151 HHCP-SERV OF PT,EA 15 MIN: HCPCS

## 2021-05-16 VITALS
OXYGEN SATURATION: 98 % | DIASTOLIC BLOOD PRESSURE: 78 MMHG | HEART RATE: 78 BPM | SYSTOLIC BLOOD PRESSURE: 140 MMHG | RESPIRATION RATE: 18 BRPM | TEMPERATURE: 98.2 F

## 2021-05-16 PROCEDURE — 3331090001 HH PPS REVENUE CREDIT

## 2021-05-16 PROCEDURE — 3331090002 HH PPS REVENUE DEBIT

## 2021-05-17 PROCEDURE — 3331090001 HH PPS REVENUE CREDIT

## 2021-05-17 PROCEDURE — 3331090002 HH PPS REVENUE DEBIT

## 2021-05-17 NOTE — NURSE NAVIGATOR
[address]  [facility] Orthopaedic Pathway Handoff     FROM:                                TO: [address]                                                      ([address] [facility] or Facility name)  Gust[address] Brown [address]  [address] [address]  Dept: [address] [facility] [address]: [phone]                                      Room#:  405/02                                                       Nurse Navigator:  Ashley Wild RN         SITUATION      ASAScore: ASA 2 - Patient with mild systemic disease with no functional limitations    Admitted:  5/12/2021  Hospital Day: 3      Attending Provider:  No att. providers found     Consultations:  None    PCP:  Edwardo Rubio MD   [phone]     Admitting Dx:  Osteoarthritis of left hip, unspecified osteoarthritis type [M16.12]       Active Problems:    Osteoarthritis of left hip (5/12/2021)      5 Days Post-Op of   Procedure(s):  LEFT TOTAL HIP ARTHROPLASTY ANTERIOR APPROACH   BY: Ying Varma MD             ON: 5/12/2021                  Code Status: Full Code             Advance Directive? Not Received (Send w/patient)     Isolation:  There are currently no Active Isolations       MDRO: No current active infections    BACKGROUND     Allergies:  No Known Allergies    Past Medical History:   Diagnosis Date    Arthritis     Bipolar affective disorder ([facility] [address]     Deviated nasal septum 12/14/2019    ED (erectile dysfunction)     Essential hypertension 5/3/2021    GERD (gastroesophageal reflux disease)     Gout     Hyperlipidemia 8/5/2011    Hypothyroidism     Inguinal hernia 11/27/2012    Rupture Achilles tendon     left; resolved.     Sleep apnea 8/31/2011    does not use CPAP/uses dental appliance       Past Surgical History:   Procedure Laterality Date    COLONOSCOPY N/A 5/22/2018    COLONOSCOPY performed by Ria Burgos [facility] at [facility] ENDOSCOPY    HX COLONOSCOPY  10/13/14    polypectomy, f/u 3 y . dr. Carolyn Red Right     tendon repair of thumb    HX OTHER SURGICAL      right inguinal hernia repair    HX OTHER SURGICAL      colonoscopy - hemorrhoids    HX TONSILLECTOMY  1960's       Prior to Admission Medications   Prescriptions Last Dose Informant Patient Reported? Taking? amLODIPine (Norvasc) 5 mg tablet 2021 at Unknown time  Yes Yes   Sig: Take 5 mg by mouth daily. carboxymethylcellulose sodium (Refresh Tears) 0.5 % drop ophthalmic solution 2021 at Unknown time  Yes Yes   Sig: Administer 1 Drop to both eyes two (2) times a day. cholecalciferol (Vitamin D3) (5000 Units/125 mcg) tab tablet   Yes No   Sig: Take 5,000 Units by mouth daily. cyclobenzaprine (FLEXERIL) 10 mg tablet  Self No No   Sig: Take 1 Tab by mouth three (3) times daily as needed for Muscle Spasm(s). Patient not taking: Reported on 5/15/2021   diclofenac (Voltaren Arthritis Pain) 1 % gel  Self No No   Sig: Apply  to affected area four (4) times daily. Apply 4 inches 4 times a day to affected area prn   Patient not taking: Reported on 5/15/2021   gabapentin (NEURONTIN) 300 mg capsule 2021 at 6pm  Yes Yes   Sig: Take 300 mg by mouth nightly. hydrocortisone (HYTONE) 2.5 % topical cream Not Taking at Unknown time  Yes No   Sig: Apply 1 g to affected area two (2) times daily as needed for Other (rash on face). use thin layer    lamotrigine (LAMICTAL) 150 mg tablet 2021 at Unknown time  Yes Yes   Sig: Take 150 mg by mouth two (2) times a day. levothyroxine (SYNTHROID) 150 mcg tablet 2021 at Unknown time  No Yes   Sig: TAKE 1 TABLET DAILY   Patient taking differently: TAKE 1 TABLET PO DAILY   lithium CR (ESKALITH CR) 450 mg CR tablet 2021 at Unknown time  Yes Yes   Sig: Take 450 mg by mouth two (2) times a day. mometasone (NASONEX) 50 mcg/actuation nasal spray 2021 at Unknown time  Yes Yes   Si [place] by Both Nostrils route nightly.    quetiapine (SEROQUEL) 100 mg tablet 2021 at Unknown time  Yes Yes   Sig: Take 200 mg by mouth nightly. sildenafiL, pulmonary hypertension, (Revatio) 20 mg tablet 2021  No No   Si to 3 pills , 1 hour before relations . Use instead of Cialis   Patient taking differently: Take 20-60 mg by mouth daily as needed for Other (prior to sexual relations). 1 to 3 pills , 1 hour before sexual relations . Use instead of Cialis   traMADoL (ULTRAM) 50 mg tablet 2021 at Unknown time  No No   Sig: Take 1 Tab by mouth every six (6) hours as needed for Pain for up to 7 days. Max Daily Amount: 200 mg. Facility-Administered Medications: None       Vaccinations:    Immunization History   Administered Date(s) Administered    COVID-19, PFIZER, MRNA, LNP-S, PF, 30MCG/0.3ML DOSE 2021, 2021    Hep A Vaccine 10/27/2013    Hep B Vaccine 11/15/2013    Influenza High Dose Vaccine PF 2016, 09/15/2019, 2020    Influenza Vaccine 2013    Influenza Vaccine (>6 mo Afluria QUAD Vial 70680 (0.25 mL) / 26615 (0.5 mL)) 2018    Influenza Vaccine Whole 2011    Pneumococcal Conjugate (PCV-13) 2016    Pneumococcal Polysaccharide (PPSV-23) 08/15/2014    Td 2008    Tdap 2012    Zoster 2009    Zoster Recombinant 2019, 04/15/2019         ASSESSMENT   Age: 66 y.o. Gender: male        Height: Height: 6' 3\" (190.5 cm)                    Weight:Weight: 85 kg (187 lb 6 oz)     No data found. Active Orders   There are no active orders of the following types: Diet.        Orientation: Orientation Level: Oriented X4    Active Lines/Drains:  (Peg Tube / Us / CL or S/L?):no    Urinary Status: Voiding      Last BM: Last Bowel Movement Date: 21     Skin Integrity: Incision (comment), Other (comment)(Left hip)             Mobility: Slightly limited   Weight Bearing Status: WBAT (Weight Bearing as Tolerated)      Gait Training  Assistive Device: Gait belt, Walker, rolling  Ambulation - Level of Assistance: Contact guard assistance, Assist x1  Distance (ft): 120 Feet (ft)  Stairs - Level of Assistance: Contact guard assistance, Assist X1  Number of Stairs Trained: 4  Rail Use: Left (SPC in opposite hand)  Interventions: Safety awareness training     On Anticoagulation? YES  Aspirin                                         Pain Medications given:  Oxycodone                                   Lab Results   Component Value Date/Time    Glucose 156 (H) 05/13/2021 01:52 AM    Hemoglobin A1c 5.0 05/05/2021 09:35 AM    INR 1.0 05/05/2021 09:35 AM    HGB 12.2 05/13/2021 01:52 AM    HGB 12.1 05/05/2021 09:35 AM       Readmission Risks:  Score:         RECOMMENDATION     See After Visit Summary (AVS) for:  · Discharge instructions  · After [address]   · Medication Reconciliation          [facility] Orthopaedic Nurse Navigator  MICHELE Matt, RN-BC       Office  [phone]  Cell      [phone]  Fax      [phone]  [email or web address]             . Dasha

## 2021-05-18 ENCOUNTER — HOME CARE VISIT (OUTPATIENT)
Dept: SCHEDULING | Facility: HOME HEALTH | Age: 71
End: 2021-05-18
Payer: MEDICARE

## 2021-05-18 ENCOUNTER — PATIENT OUTREACH (OUTPATIENT)
Dept: CASE MANAGEMENT | Age: 71
End: 2021-05-18

## 2021-05-18 VITALS
DIASTOLIC BLOOD PRESSURE: 50 MMHG | OXYGEN SATURATION: 97 % | SYSTOLIC BLOOD PRESSURE: 126 MMHG | TEMPERATURE: 98.7 F | RESPIRATION RATE: 16 BRPM | HEART RATE: 63 BPM

## 2021-05-18 PROCEDURE — 3331090002 HH PPS REVENUE DEBIT

## 2021-05-18 PROCEDURE — 3331090001 HH PPS REVENUE CREDIT

## 2021-05-18 PROCEDURE — G0151 HHCP-SERV OF PT,EA 15 MIN: HCPCS

## 2021-05-18 NOTE — PROGRESS NOTES
Post Discharge Follow-up contact after Joint Replacement    Patient discharged on 5/14/21  By  Kristopher Silva   following  right hip Arthroplasty. Spoke with patient's wife today, who reports that \" his mobility has improved since we got home. \"  Denies Fever, Shortness of Breath or Chest Pain. Home Health has visited. Patient also reports:  Aquacel dressing  clean, dry, intact  Calf is non-tender, operative extremity has minimal swelling. Pain is well managed. Discussed use of ice & elevation. Patient is progressing with therapy and is exercising independently. Taking Aspirin for anticoagulation, Tramadol and Tylenol for pain. Patient is experiencing symptoms of constipation (no BM since 5/12)  He has positive flatus, tolerating solid food and fluids and taking a bowel regimen of senokot BID and fiber product daily. He is urinating without difficulty. Discussed side effects of anticoagulants & pain medications (bleeding/bruising, constipation, lightheaded/dizziness)  Follow up appointment is not scheduled; but patient states plan to schedule. Discussed calling surgeon Dr Dana Olivarez  for drainage, bleeding, swelling in operative extremity, fever or pain. Discussed calling PCP Dr Gerry Rivas with other medical issues.

## 2021-05-19 PROCEDURE — 3331090001 HH PPS REVENUE CREDIT

## 2021-05-19 PROCEDURE — 3331090002 HH PPS REVENUE DEBIT

## 2021-05-20 PROCEDURE — 3331090001 HH PPS REVENUE CREDIT

## 2021-05-20 PROCEDURE — 3331090002 HH PPS REVENUE DEBIT

## 2021-05-21 ENCOUNTER — HOME CARE VISIT (OUTPATIENT)
Dept: SCHEDULING | Facility: HOME HEALTH | Age: 71
End: 2021-05-21
Payer: MEDICARE

## 2021-05-21 VITALS
TEMPERATURE: 98.2 F | DIASTOLIC BLOOD PRESSURE: 60 MMHG | HEART RATE: 65 BPM | SYSTOLIC BLOOD PRESSURE: 110 MMHG | OXYGEN SATURATION: 99 % | RESPIRATION RATE: 16 BRPM

## 2021-05-21 PROCEDURE — G0151 HHCP-SERV OF PT,EA 15 MIN: HCPCS

## 2021-05-21 PROCEDURE — 3331090002 HH PPS REVENUE DEBIT

## 2021-05-21 PROCEDURE — 3331090001 HH PPS REVENUE CREDIT

## 2021-05-22 PROCEDURE — 3331090001 HH PPS REVENUE CREDIT

## 2021-05-22 PROCEDURE — 3331090002 HH PPS REVENUE DEBIT

## 2021-05-23 PROCEDURE — 3331090001 HH PPS REVENUE CREDIT

## 2021-05-23 PROCEDURE — 3331090002 HH PPS REVENUE DEBIT

## 2021-05-24 ENCOUNTER — HOME CARE VISIT (OUTPATIENT)
Dept: SCHEDULING | Facility: HOME HEALTH | Age: 71
End: 2021-05-24
Payer: MEDICARE

## 2021-05-24 VITALS
OXYGEN SATURATION: 98 % | RESPIRATION RATE: 14 BRPM | SYSTOLIC BLOOD PRESSURE: 112 MMHG | HEART RATE: 55 BPM | DIASTOLIC BLOOD PRESSURE: 55 MMHG | TEMPERATURE: 98 F

## 2021-05-24 PROCEDURE — G0151 HHCP-SERV OF PT,EA 15 MIN: HCPCS

## 2021-05-24 PROCEDURE — 3331090002 HH PPS REVENUE DEBIT

## 2021-05-24 PROCEDURE — 3331090001 HH PPS REVENUE CREDIT

## 2021-05-25 PROCEDURE — 3331090002 HH PPS REVENUE DEBIT

## 2021-05-25 PROCEDURE — 3331090001 HH PPS REVENUE CREDIT

## 2021-05-26 ENCOUNTER — HOME CARE VISIT (OUTPATIENT)
Dept: SCHEDULING | Facility: HOME HEALTH | Age: 71
End: 2021-05-26
Payer: MEDICARE

## 2021-05-26 VITALS
DIASTOLIC BLOOD PRESSURE: 60 MMHG | SYSTOLIC BLOOD PRESSURE: 122 MMHG | RESPIRATION RATE: 18 BRPM | OXYGEN SATURATION: 99 % | HEART RATE: 58 BPM | TEMPERATURE: 97.9 F

## 2021-05-26 PROCEDURE — G0151 HHCP-SERV OF PT,EA 15 MIN: HCPCS

## 2021-05-26 PROCEDURE — 3331090001 HH PPS REVENUE CREDIT

## 2021-05-26 PROCEDURE — 3331090002 HH PPS REVENUE DEBIT

## 2021-05-27 PROCEDURE — 3331090001 HH PPS REVENUE CREDIT

## 2021-05-27 PROCEDURE — 3331090002 HH PPS REVENUE DEBIT

## 2021-05-28 PROCEDURE — 3331090001 HH PPS REVENUE CREDIT

## 2021-05-28 PROCEDURE — 3331090002 HH PPS REVENUE DEBIT

## 2021-05-29 PROCEDURE — 3331090001 HH PPS REVENUE CREDIT

## 2021-05-29 PROCEDURE — 3331090002 HH PPS REVENUE DEBIT

## 2021-05-30 PROCEDURE — 3331090002 HH PPS REVENUE DEBIT

## 2021-05-30 PROCEDURE — 3331090001 HH PPS REVENUE CREDIT

## 2021-05-31 PROCEDURE — 3331090002 HH PPS REVENUE DEBIT

## 2021-05-31 PROCEDURE — 3331090001 HH PPS REVENUE CREDIT

## 2021-06-01 ENCOUNTER — HOME CARE VISIT (OUTPATIENT)
Dept: SCHEDULING | Facility: HOME HEALTH | Age: 71
End: 2021-06-01
Payer: MEDICARE

## 2021-06-01 VITALS
TEMPERATURE: 97.9 F | OXYGEN SATURATION: 100 % | SYSTOLIC BLOOD PRESSURE: 130 MMHG | HEART RATE: 61 BPM | RESPIRATION RATE: 18 BRPM | DIASTOLIC BLOOD PRESSURE: 60 MMHG

## 2021-06-01 PROCEDURE — G0151 HHCP-SERV OF PT,EA 15 MIN: HCPCS

## 2021-06-01 PROCEDURE — 3331090002 HH PPS REVENUE DEBIT

## 2021-06-01 PROCEDURE — 3331090001 HH PPS REVENUE CREDIT

## 2021-06-02 PROCEDURE — 3331090002 HH PPS REVENUE DEBIT

## 2021-06-02 PROCEDURE — 3331090001 HH PPS REVENUE CREDIT

## 2021-06-03 ENCOUNTER — HOME CARE VISIT (OUTPATIENT)
Dept: SCHEDULING | Facility: HOME HEALTH | Age: 71
End: 2021-06-03
Payer: MEDICARE

## 2021-06-03 VITALS
HEART RATE: 57 BPM | DIASTOLIC BLOOD PRESSURE: 58 MMHG | SYSTOLIC BLOOD PRESSURE: 126 MMHG | OXYGEN SATURATION: 98 % | RESPIRATION RATE: 16 BRPM | TEMPERATURE: 97.5 F

## 2021-06-03 PROCEDURE — G0151 HHCP-SERV OF PT,EA 15 MIN: HCPCS

## 2021-06-03 PROCEDURE — 3331090002 HH PPS REVENUE DEBIT

## 2021-06-03 PROCEDURE — 3331090001 HH PPS REVENUE CREDIT

## 2021-10-05 ENCOUNTER — TRANSCRIBE ORDER (OUTPATIENT)
Dept: SCHEDULING | Age: 71
End: 2021-10-05

## 2021-10-05 DIAGNOSIS — N17.9 ACUTE KIDNEY FAILURE, UNSPECIFIED (HCC): Primary | ICD-10-CM

## 2021-10-19 ENCOUNTER — ANESTHESIA (OUTPATIENT)
Dept: ENDOSCOPY | Age: 71
End: 2021-10-19
Payer: MEDICARE

## 2021-10-19 ENCOUNTER — HOSPITAL ENCOUNTER (OUTPATIENT)
Age: 71
Setting detail: OUTPATIENT SURGERY
Discharge: HOME | End: 2021-10-19
Attending: INTERNAL MEDICINE | Admitting: INTERNAL MEDICINE
Payer: MEDICARE

## 2021-10-19 ENCOUNTER — ANESTHESIA EVENT (OUTPATIENT)
Dept: ENDOSCOPY | Age: 71
End: 2021-10-19
Payer: MEDICARE

## 2021-10-19 VITALS
OXYGEN SATURATION: 98 % | WEIGHT: 178 LBS | HEIGHT: 75 IN | HEART RATE: 58 BPM | SYSTOLIC BLOOD PRESSURE: 127 MMHG | TEMPERATURE: 98.9 F | RESPIRATION RATE: 13 BRPM | DIASTOLIC BLOOD PRESSURE: 70 MMHG | BODY MASS INDEX: 22.13 KG/M2

## 2021-10-19 PROCEDURE — 88305 TISSUE EXAM BY PATHOLOGIST: CPT

## 2021-10-19 PROCEDURE — 74011000250 HC RX REV CODE- 250: Performed by: NURSE ANESTHETIST, CERTIFIED REGISTERED

## 2021-10-19 PROCEDURE — 2709999900 HC NON-CHARGEABLE SUPPLY: Performed by: INTERNAL MEDICINE

## 2021-10-19 PROCEDURE — 76060000031 HC ANESTHESIA FIRST 0.5 HR: Performed by: INTERNAL MEDICINE

## 2021-10-19 PROCEDURE — 77030020268 HC MISC GENERAL SUPPLY: Performed by: INTERNAL MEDICINE

## 2021-10-19 PROCEDURE — 74011250636 HC RX REV CODE- 250/636: Performed by: INTERNAL MEDICINE

## 2021-10-19 PROCEDURE — 74011000258 HC RX REV CODE- 258: Performed by: NURSE ANESTHETIST, CERTIFIED REGISTERED

## 2021-10-19 PROCEDURE — 76040000019: Performed by: INTERNAL MEDICINE

## 2021-10-19 PROCEDURE — 74011250636 HC RX REV CODE- 250/636: Performed by: NURSE ANESTHETIST, CERTIFIED REGISTERED

## 2021-10-19 RX ORDER — SODIUM CHLORIDE 9 MG/ML
150 INJECTION, SOLUTION INTRAVENOUS CONTINUOUS
Status: DISCONTINUED | OUTPATIENT
Start: 2021-10-19 | End: 2021-10-19 | Stop reason: HOSPADM

## 2021-10-19 RX ORDER — DEXTROMETHORPHAN/PSEUDOEPHED 2.5-7.5/.8
1.2 DROPS ORAL
Status: DISCONTINUED | OUTPATIENT
Start: 2021-10-19 | End: 2021-10-19 | Stop reason: HOSPADM

## 2021-10-19 RX ORDER — FENTANYL CITRATE 50 UG/ML
200 INJECTION, SOLUTION INTRAMUSCULAR; INTRAVENOUS
Status: DISCONTINUED | OUTPATIENT
Start: 2021-10-19 | End: 2021-10-19 | Stop reason: HOSPADM

## 2021-10-19 RX ORDER — SODIUM CHLORIDE 0.9 % (FLUSH) 0.9 %
5-40 SYRINGE (ML) INJECTION EVERY 8 HOURS
Status: DISCONTINUED | OUTPATIENT
Start: 2021-10-19 | End: 2021-10-19 | Stop reason: HOSPADM

## 2021-10-19 RX ORDER — EPINEPHRINE 0.1 MG/ML
1 INJECTION INTRACARDIAC; INTRAVENOUS
Status: DISCONTINUED | OUTPATIENT
Start: 2021-10-19 | End: 2021-10-19 | Stop reason: HOSPADM

## 2021-10-19 RX ORDER — SODIUM CHLORIDE 0.9 % (FLUSH) 0.9 %
5-40 SYRINGE (ML) INJECTION AS NEEDED
Status: DISCONTINUED | OUTPATIENT
Start: 2021-10-19 | End: 2021-10-19 | Stop reason: HOSPADM

## 2021-10-19 RX ORDER — LIDOCAINE HYDROCHLORIDE 20 MG/ML
INJECTION, SOLUTION EPIDURAL; INFILTRATION; INTRACAUDAL; PERINEURAL AS NEEDED
Status: DISCONTINUED | OUTPATIENT
Start: 2021-10-19 | End: 2021-10-19 | Stop reason: HOSPADM

## 2021-10-19 RX ORDER — PROPOFOL 10 MG/ML
INJECTION, EMULSION INTRAVENOUS AS NEEDED
Status: DISCONTINUED | OUTPATIENT
Start: 2021-10-19 | End: 2021-10-19 | Stop reason: HOSPADM

## 2021-10-19 RX ORDER — GLYCOPYRROLATE 0.2 MG/ML
INJECTION INTRAMUSCULAR; INTRAVENOUS AS NEEDED
Status: DISCONTINUED | OUTPATIENT
Start: 2021-10-19 | End: 2021-10-19 | Stop reason: HOSPADM

## 2021-10-19 RX ORDER — SODIUM CHLORIDE 9 MG/ML
INJECTION, SOLUTION INTRAVENOUS
Status: DISCONTINUED | OUTPATIENT
Start: 2021-10-19 | End: 2021-10-19 | Stop reason: HOSPADM

## 2021-10-19 RX ORDER — MIDAZOLAM HYDROCHLORIDE 1 MG/ML
.25-5 INJECTION, SOLUTION INTRAMUSCULAR; INTRAVENOUS
Status: DISCONTINUED | OUTPATIENT
Start: 2021-10-19 | End: 2021-10-19 | Stop reason: HOSPADM

## 2021-10-19 RX ORDER — ATROPINE SULFATE 0.1 MG/ML
0.5 INJECTION INTRAVENOUS
Status: DISCONTINUED | OUTPATIENT
Start: 2021-10-19 | End: 2021-10-19 | Stop reason: HOSPADM

## 2021-10-19 RX ADMIN — LIDOCAINE HYDROCHLORIDE 80 MG: 20 INJECTION, SOLUTION EPIDURAL; INFILTRATION; INTRACAUDAL; PERINEURAL at 09:07

## 2021-10-19 RX ADMIN — SODIUM CHLORIDE: 9 INJECTION, SOLUTION INTRAVENOUS at 09:03

## 2021-10-19 RX ADMIN — GLYCOPYRROLATE 0.2 MG: 0.2 INJECTION, SOLUTION INTRAMUSCULAR; INTRAVENOUS at 09:07

## 2021-10-19 RX ADMIN — PROPOFOL 50 MG: 10 INJECTION, EMULSION INTRAVENOUS at 09:07

## 2021-10-19 RX ADMIN — PROPOFOL 50 MG: 10 INJECTION, EMULSION INTRAVENOUS at 09:12

## 2021-10-19 RX ADMIN — PROPOFOL 50 MG: 10 INJECTION, EMULSION INTRAVENOUS at 09:15

## 2021-10-19 RX ADMIN — PROPOFOL 50 MG: 10 INJECTION, EMULSION INTRAVENOUS at 09:08

## 2021-10-19 RX ADMIN — PROPOFOL 50 MG: 10 INJECTION, EMULSION INTRAVENOUS at 09:23

## 2021-10-19 RX ADMIN — PROPOFOL 50 MG: 10 INJECTION, EMULSION INTRAVENOUS at 09:19

## 2021-10-19 NOTE — PROCEDURES
Anne Reyes 912 Zandra Joel M.D.  [address] [place] [address]  [phone]               Colonoscopy Procedure Note    NAME: Fernanda Oro  :  1950  MRN:  [id number]    Indications:   Personal history of colon polyps (screening only)     : Alcides Harvey MD    Referring Provider:  Shabana Melton MD    Staff: Endoscopy RN-1: Yara Kirkland RN  Endoscopy RN-2: Jerome Youngblood RN    Prosthetic devices, grafts, tissues, transplant, or devices implanted: none    Medicines:  MAC anesthesia      Procedure Details:  After informed consent was obtained with all risks and benefits of the procedure explained and preprocedure exam completed, the patient was placed in the left lateral decubitus position. Universal protocol for patient identification was performed and documented in the nursing notes. Throughout the procedure, the patient's blood pressure was monitored at least every five minutes; pulse, and oxygen saturations were monitored continuously. All vital signs were documented in the nursing notes. A digital rectal exam was performed and was normal.  The Olympus videocolonoscope  was inserted in the rectum and carefully advanced to the cecum, which was identified by the ileocecal valve and appendiceal orifice. The colonoscope was slowly withdrawn with careful evaluation between folds. Retroflexion in the rectum was performed; findings and interventions are described below. Procedure start time, extent reached time/cecum time, and procedure end time are documented in the nursing notes. The quality of preparation was adequate. Findings:   1. 8 sessile polyps with greatest diameter of 8 mm (3 in the ascending, 4 in the transverse, 1 in the descending colon) s/p cold snare polypectomy  2. Moderate L sided diverticulosis  3.  Ulcerated external hemorrhoid    Interventions:    8 complete polypectomy were performed using cold snare  and the polyps were  retrieved    Specimens:   ID Type Source Tests Collected by Time Destination   1 : pathology Preservative Colon, Ascending  Audrey Gomez MD 10/19/2021 0919 Pathology   2 : pathology Preservative Colon, Transverse  Audrey Gomez MD 10/19/2021 0920 Pathology   3 : pathology Preservative Colon, Descending  Audrey Gomez MD 10/19/2021 3236 Pathology       EBL:  None. Complications:   No immediate complications     Impression:  -See post-procedure diagnoses. Recommendations:   -Repeat colonoscopy in 3 years. If < 10 years, reason:  above average risk patient    Contact information given for CRS-Dr. Kd Barrientos for further evaluation/management      Signed by:  Deepali Leung MD          10/19/2021  9:32 AM

## 2021-10-19 NOTE — DISCHARGE INSTRUCTIONS
Anne Reyes 912 Buster Piedra M.D.  [address] [place] [address], [address] [place] [address]  [phone]          COLONOSCOPY [address] [place] [address] Kajal  [id number]  1950    DISCOMFORT:  Redness at IV site- apply warm compress to area; if redness or soreness persist- contact your physician  There may be a slight amount of blood passed from the rectum  Gaseous discomfort- walking, belching will help relieve any discomfort  You may not operate a vehicle for 12 hours  You may not engage in an occupation involving machinery or appliances for the  rest of today  You may not drink alcoholic beverages for at least 12 hours  Avoid making any critical decisions for at least 24 hours    DIET:   You may resume your normal diet, but some patients find that heavy or large  meals may lead to indigestion or vomiting. I suggest a light meal as first food  intake. I recommend a whole food, plant-based diet for your overall health. ACTIVITY:  You may resume your normal daily activities. It is recommended that you spend the remainder of the day resting - avoid any strenuous activity. CALL M.D. IF ANY SIGN OF:   Increasing pain, nausea, vomiting  Abdominal distension (swelling)  Significant bleeding (oral or rectal)  Fever   Pain in chest area  Shortness of breath    Additional Instructions:   Call Dr. Buster Piedra if any questions or problems at [phone]   You should receive the biopsy results by phone or mail within 3 weeks, if not, call  my office for the results      Should have a repeat colonoscopy in 3 years. Colonoscopy showed 8 polyps removed, diverticulosis, likely ulcerated external hemorrhoid. I want you to have that further evaluated by Dr. Drew Rome-colorectal surgery-they will give you contact info in recovery. Learning About Coronavirus [phone])  Coronavirus [phone]): Overview  What is coronavirus (COVID-19)?   The coronavirus disease (COVID-19) is caused by a virus. It is an illness that was first found in [place], [place], in December 2019. It has since spread worldwide. The virus can cause fever, cough, and trouble breathing. In severe cases, it can cause pneumonia and make it hard to breathe without help. It can cause death. Coronaviruses are a large group of viruses. They cause the common cold. They also cause more serious illnesses like Middle East respiratory syndrome (MERS) and severe acute respiratory syndrome (SARS). COVID-19 is caused by a novel coronavirus. That means it's a new type that has not been seen in people before. This virus spreads person-to-person through droplets from coughing and sneezing. It can also spread when you are close to someone who is infected. And it can spread when you touch something that has the virus on it, such as a doorknob or a tabletop. What can you do to protect yourself from coronavirus (COVID-19)? The best way to protect yourself from getting sick is to:  · Avoid areas where there is an outbreak. · Avoid contact with people who may be infected. · Wash your hands often with soap or alcohol-based hand sanitizers. · Avoid crowds and try to stay at least 6 feet away from other people. · Wash your hands often, especially after you cough or sneeze. Use soap and water, and scrub for at least 20 seconds. If soap and water aren't available, use an alcohol-based hand . · Avoid touching your mouth, nose, and eyes. What can you do to avoid spreading the virus to others? To help avoid spreading the virus to others:  · Cover your mouth with a tissue when you cough or sneeze. Then throw the tissue in the trash. · Use a disinfectant to clean things that you touch often. · Stay home if you are sick or have been exposed to the virus. Don't go to school, work, or public areas. And don't use public transportation. · If you are sick:  ? Leave your home only if you need to get medical care.  But call the doctor's office first so they know you're coming. And wear a face mask, if you have one.  ? If you have a face mask, wear it whenever you're around other people. It can help stop the spread of the virus when you cough or sneeze. ? Clean and disinfect your home every day. Use household  and disinfectant wipes or sprays. Take special care to clean things that you grab with your hands. These include doorknobs, remote controls, phones, and handles on your refrigerator and microwave. And don't forget countertops, tabletops, bathrooms, and computer keyboards. When to call for help  Call 911 anytime you think you may need emergency care. For example, call if:  · You have severe trouble breathing. (You can't talk at all.)  · You have constant chest pain or pressure. · You are severely dizzy or lightheaded. · You are confused or can't think clearly. · Your face and lips have a blue color. · You pass out (lose consciousness) or are very hard to wake up. Call your doctor now if you develop symptoms such as:  · Shortness of breath. · Fever. · Cough. If you need to get care, call ahead to the doctor's office for instructions before you go. Make sure you wear a face mask, if you have one, to prevent exposing other people to the virus. Where can you get the latest information? The following health organizations are tracking and studying this virus. Their websites contain the most up-to-date information. Stuart Huynh also learn what to do if you think you may have been exposed to the virus. · U.S. Centers for Disease Control and Prevention (CDC): The CDC provides updated news about the disease and travel advice. The website also tells you how to prevent the spread of infection. www.cdc.gov  · World Health Organization [facility]): WHO offers information about the virus outbreaks. WHO also has travel advice. www.who.int  Current as of: April 1, 2020               Content Version: 12.4  © 5499-4084 [employer].    Care instructions adapted under license by your healthcare professional. If you have questions about a medical condition or this instruction, always ask your healthcare professional. [place] [address] any warranty or liability for your use of this information.

## 2021-10-19 NOTE — ANESTHESIA PREPROCEDURE EVALUATION
Relevant Problems   RESPIRATORY SYSTEM   (+) Sleep apnea      NEUROLOGY   (+) Bipolar affective disorder (HCC)   (+) Bipolar disorder (HCC)      CARDIOVASCULAR   (+) Essential hypertension      ENDOCRINE   (+) Hypothyroidism       Anesthetic History   No history of anesthetic complications            Review of Systems / Medical History  Patient summary reviewed, nursing notes reviewed and pertinent labs reviewed    Pulmonary        Sleep apnea: No treatment        Comments: Former Smoker   Neuro/Psych         Psychiatric history     Cardiovascular    Hypertension              Exercise tolerance: >4 METS     GI/Hepatic/Renal     GERD: well controlled           Endo/Other      Hypothyroidism  Arthritis     Other Findings              Physical Exam    Airway  Mallampati: II  TM Distance: > 6 cm  Neck ROM: decreased range of motion   Mouth opening: Normal     Cardiovascular  Regular rate and rhythm,  S1 and S2 normal,  no murmur, click, rub, or gallop  Rhythm: regular  Rate: normal         Dental    Dentition: Caps/crowns     Pulmonary  Breath sounds clear to auscultation               Abdominal  GI exam deferred       Other Findings            Anesthetic Plan    ASA: 2  Anesthesia type: MAC          Induction: Intravenous  Anesthetic plan and risks discussed with: Patient

## 2021-10-19 NOTE — H&P
[address], [address] [place] [address]          Pre-procedure History and Physical       NAME:  Lashay Coronel   :   1950   MRN:   [id number]     CHIEF COMPLAINT/HPI: colon polyps    PMH:  Past Medical History:   Diagnosis Date    Arthritis     Bipolar affective disorder ([address]     Deviated nasal septum 2019    ED (erectile dysfunction)     Essential hypertension 5/3/2021    GERD (gastroesophageal reflux disease)     Gout     HTN (hypertension)     Hyperlipidemia 2011    Hypothyroidism     Inguinal hernia 2012    Rupture Achilles tendon     left; resolved.  Sleep apnea 2011    does not use CPAP/uses dental appliance       PSH:  Past Surgical History:   Procedure Laterality Date    COLONOSCOPY N/A 2018    COLONOSCOPY [facility]olay [facility]a [facility] at [facility] ENDOSCOPY    HX COLONOSCOPY  10/13/14    polypectomy, f/u 3 y . dr. Miguel Ly Right     tendon repair of thumb    HX ORTHOPAEDIC      left hip    HX OTHER SURGICAL      right inguinal hernia repair    HX OTHER SURGICAL      colonoscopy - hemorrhoids    HX TONSILLECTOMY  's       Allergies:  No Known Allergies    Home Medications:  Prior to Admission Medications   Prescriptions Last Dose Informant Patient Reported? Taking?   acetaminophen (TYLENOL) 650 mg TbER 10/17/2021  Yes No   Sig: Take 650 mg by mouth four (4) times daily. amLODIPine (Norvasc) 5 mg tablet 10/17/2021  No No   Sig: Take 1 Tablet by mouth daily. aspirin 81 mg chewable tablet Not Taking at Unknown time  No No   Sig: Take 1 Tab by mouth two (2) times a day. Patient not taking: Reported on 10/19/2021   carboxymethylcellulose sodium (Refresh Tears) 0.5 % drop ophthalmic solution 10/18/2021 at Unknown time  Yes Yes   Sig: Administer 1 Drop to both eyes two (2) times a day.    diclofenac (Voltaren Arthritis Pain) 1 % gel 10/18/2021 at Unknown time Self No Yes   Sig: Apply  to affected area four (4) times daily. Apply 4 inches 4 times a day to affected area prn   hydrocortisone (HYTONE) 2.5 % topical cream Not Taking at Unknown time  Yes No   Sig: Apply 1 g to affected area two (2) times daily as needed for Other (rash on face). use thin layer    Patient not taking: Reported on 10/19/2021   lamotrigine (LAMICTAL) 150 mg tablet 10/19/2021 at Unknown time  Yes Yes   Sig: Take 150 mg by mouth two (2) times a day. levothyroxine (SYNTHROID) 150 mcg tablet 10/19/2021 at Unknown time  No Yes   Sig: TAKE 1 TABLET DAILY   Patient taking differently: TAKE 1 TABLET PO DAILY   lithium CR (ESKALITH CR) 450 mg CR tablet 10/19/2021 at Unknown time  Yes Yes   Sig: Take 450 mg by mouth two (2) times a day. mometasone (NASONEX) 50 mcg/actuation nasal spray 10/18/2021 at Unknown time  Yes Yes   Si [place] by Both Nostrils route nightly. naloxone (NARCAN) 4 mg/actuation nasal spray Not Taking at Unknown time Self No No   Sig: Use 1 spray intranasally, then discard. Repeat with new spray every 2 min as needed for opioid overdose symptoms, alternating nostrils. Patient not taking: Reported on 5/15/2021   quetiapine (SEROQUEL) 100 mg tablet 10/18/2021 at Unknown time  Yes Yes   Sig: Take 200 mg by mouth nightly. senna-docusate (PERICOLACE) 8.6-50 mg per tablet Not Taking at Unknown time  No No   Sig: Take 1 Tab by mouth two (2) times a day. Indications: constipation   Patient not taking: Reported on 10/19/2021   sildenafiL, pulmonary hypertension, (Revatio) 20 mg tablet Not Taking at Unknown time  No No   Si to 3 pills , 1 hour before relations .  Use instead of Cialis   Patient not taking: Reported on 10/19/2021      Facility-Administered Medications: None       Hospital Medications:  Current Facility-Administered Medications   Medication Dose Route Frequency    0.9% sodium chloride infusion  150 mL/hr IntraVENous CONTINUOUS    sodium chloride (NS) flush 5-40 mL  5-40 mL IntraVENous Q8H    sodium chloride (NS) flush 5-40 mL  5-40 mL IntraVENous PRN    midazolam (VERSED) injection 0.25-5 mg  0.25-5 mg IntraVENous Multiple    fentaNYL citrate (PF) injection 200 mcg  200 mcg IntraVENous Multiple    simethicone (MYLICON) 93KU/5.0LF oral drops 80 mg  1.2 mL Oral Multiple    atropine injection 0.5 mg  0.5 mg IntraVENous ONCE PRN    EPINEPHrine (ADRENALIN) 0.1 mg/mL syringe 1 mg  1 mg Endoscopically ONCE PRN       Family History:  Family History   Problem Relation Age of Onset    Other Father         Snoring, insomnia    Suicide Father     Heart Attack Mother 79    Bipolar Disorder Mother     No Known Problems Sister     Bipolar Disorder Maternal Aunt     Anesth Problems Neg Hx        Social History:  Social History     Tobacco Use    Smoking status: Former Smoker    Smokeless tobacco: Never Used    Tobacco comment: quit smoking cigarettes/pipe 40 yrs ago   Substance Use Topics    Alcohol use: Yes     Comment:  2-3   drinks per week        The patient was counseled at length about the risks of matthew Covid-19 in the stone-operative and post-operative states including the recovery window of their procedure. The patient was made aware that matthew Covid-19 after a surgical procedure may worsen their prognosis for recovering from the virus and lend to a higher morbidity and or mortality risk. The patient was given the options of postponing their procedure. All of the risks, benefits, and alternatives were discussed. The patient does  wish to proceed with the procedure. PHYSICAL EXAM PRIOR TO SEDATION:  General: Alert, in no acute distress    Lungs:            CTA bilaterally  Heart:  Normal S1, S2    Abdomen: Soft, Non distended, Non tender. Normoactive bowel sounds. Assessment:   Stable for sedation administration.   Date of last colonoscopy: 3 yrs, Polyps  Yes    Plan:     · Endoscopic procedure with sedation     Signed By: Rachelle Myers MD     10/19/2021  9:06 AM

## 2021-10-19 NOTE — ANESTHESIA POSTPROCEDURE EVALUATION
Procedure(s):  LEFT TOTAL HIP ARTHROPLASTY ANTERIOR APPROACH. MAC    Anesthesia Post Evaluation      Multimodal analgesia: multimodal analgesia used between 6 hours prior to anesthesia start to PACU discharge  Patient location during evaluation: PACU  Patient participation: complete - patient participated  Level of consciousness: awake and alert  Pain management: adequate  Airway patency: patent  Anesthetic complications: no  Cardiovascular status: acceptable  Respiratory status: acceptable  Hydration status: acceptable  Comments: I have evaluated the patient and meets criteria for discharge from PACU  Post anesthesia nausea and vomiting:  none  Final Post Anesthesia Temperature Assessment:  Normothermia (36.0-37.5 degrees C)      INITIAL Post-op Vital signs:   Vitals Value Taken Time   /50 05/12/21 1115   Temp 36.8 °C (98.2 °F) 05/12/21 1052   Pulse 64 05/12/21 1125   Resp 14 05/12/21 1125   SpO2 100 % 05/12/21 1125   Vitals shown include unvalidated device data.

## 2021-10-27 ENCOUNTER — HOSPITAL ENCOUNTER (OUTPATIENT)
Dept: ULTRASOUND IMAGING | Age: 71
Discharge: HOME | End: 2021-10-27
Attending: INTERNAL MEDICINE
Payer: MEDICARE

## 2021-10-27 DIAGNOSIS — N17.9 ACUTE KIDNEY FAILURE, UNSPECIFIED (HCC): ICD-10-CM

## 2021-10-27 PROCEDURE — 76770 US EXAM ABDO BACK WALL COMP: CPT

## 2021-11-09 ENCOUNTER — OFFICE VISIT (OUTPATIENT)
Dept: NEUROLOGY | Age: 71
End: 2021-11-09
Payer: MEDICARE

## 2021-11-09 VITALS
BODY MASS INDEX: 22.75 KG/M2 | WEIGHT: 183 LBS | DIASTOLIC BLOOD PRESSURE: 78 MMHG | RESPIRATION RATE: 18 BRPM | SYSTOLIC BLOOD PRESSURE: 118 MMHG | HEART RATE: 70 BPM | HEIGHT: 75 IN | OXYGEN SATURATION: 99 %

## 2021-11-09 DIAGNOSIS — R41.3 COMPLAINTS OF MEMORY DISTURBANCE: ICD-10-CM

## 2021-11-09 DIAGNOSIS — G21.11 NEUROLEPTIC INDUCED PARKINSONISM (HCC): Primary | ICD-10-CM

## 2021-11-09 DIAGNOSIS — R25.1 TREMORS OF NERVOUS SYSTEM: ICD-10-CM

## 2021-11-09 PROCEDURE — G8536 NO DOC ELDER MAL SCRN: HCPCS | Performed by: PSYCHIATRY & NEUROLOGY

## 2021-11-09 PROCEDURE — G8752 SYS BP LESS 140: HCPCS | Performed by: PSYCHIATRY & NEUROLOGY

## 2021-11-09 PROCEDURE — G8754 DIAS BP LESS 90: HCPCS | Performed by: PSYCHIATRY & NEUROLOGY

## 2021-11-09 PROCEDURE — G8420 CALC BMI NORM PARAMETERS: HCPCS | Performed by: PSYCHIATRY & NEUROLOGY

## 2021-11-09 PROCEDURE — 3017F COLORECTAL CA SCREEN DOC REV: CPT | Performed by: PSYCHIATRY & NEUROLOGY

## 2021-11-09 PROCEDURE — G8427 DOCREV CUR MEDS BY ELIG CLIN: HCPCS | Performed by: PSYCHIATRY & NEUROLOGY

## 2021-11-09 PROCEDURE — 99214 OFFICE O/P EST MOD 30 MIN: CPT | Performed by: PSYCHIATRY & NEUROLOGY

## 2021-11-09 PROCEDURE — 1101F PT FALLS ASSESS-DOCD LE1/YR: CPT | Performed by: PSYCHIATRY & NEUROLOGY

## 2021-11-09 PROCEDURE — G9717 DOC PT DX DEP/BP F/U NT REQ: HCPCS | Performed by: PSYCHIATRY & NEUROLOGY

## 2021-11-09 RX ORDER — MENTHOL
1000 GEL (GRAM) TOPICAL DAILY
COMMUNITY
End: 2022-06-02

## 2021-11-09 RX ORDER — ERGOCALCIFEROL 1.25 MG/1
50000 CAPSULE ORAL
COMMUNITY

## 2021-11-09 RX ORDER — UREA 10 %
100 LOTION (ML) TOPICAL DAILY
COMMUNITY

## 2021-11-09 NOTE — PROGRESS NOTES
Neurology Clinic Follow up Note    Patient ID:  Bessie Krabbe  [id number]  66 y.o.  1950      Mr. Salvatore Harrison is here for follow up today of memory impairment. Last Appointment With Me:  1/5/21      Interval History:   He returns for follow up today due to tremors. He notes hand tremors R>L, more notable with activity. After L hip surgery, he noted some more recent b/l LE tremors occurring intermittently. Denies bradykinesia, freezing episodes. He is having more unsteady gait/imbalance since his hip replacement 5/2021. His wife reports patient is drooling on occasion. Of note, he remains on lithium and seroquel for bipolar disorder. In regards to his short term memory, he feels this is stable from last visit. Recent MMSE completed by PCP was within normal range. He does note some difficulty using computers or cell phones at times. Ability to function:  Driving: Yes, no issues with navigation/MVAs  Finances: Handles on his own, no issues  Cooking: No  Manages own medication: Yes, rarely will take PM medications in the AM.   Residing: at home with his wife      PMHx/ PSHx/ FHx/ SHx:  Reviewed and unchanged previous visit. Past Medical History:   Diagnosis Date    Arthritis     Bipolar affective disorder ([facility] [address]     Deviated nasal septum 12/14/2019    ED (erectile dysfunction)     Essential hypertension 5/3/2021    GERD (gastroesophageal reflux disease)     Gout     HTN (hypertension)     Hyperlipidemia 8/5/2011    Hypothyroidism     Inguinal hernia 11/27/2012    Rupture Achilles tendon     left; resolved.  Sleep apnea 8/31/2011    does not use CPAP/uses dental appliance         ROS:  Comprehensive review of systems negative except for as noted above. Objective:       Meds:  Current Outpatient Medications   Medication Sig Dispense Refill    cyanocobalamin (Vitamin B-12) 100 mcg tablet Take 100 mcg by mouth daily.       vitamin e (E GEMS) 1,000 unit capsule Take 1,000 Units by mouth daily.  ergocalciferol (Vitamin D2) 1,250 mcg (50,000 unit) capsule Take 50,000 Units by mouth.  sildenafiL, pulmonary hypertension, (REVATIO) 20 mg tablet TAKE 1-3 TABLETS BY MOUTH 1 HOUR BEFORE RELATIONS. ** USE INSTEAD OF CIALIS ** 30 Tablet 11    amLODIPine (Norvasc) 5 mg tablet Take 1 Tablet by mouth daily. 90 Tablet 3    acetaminophen (TYLENOL) 650 mg TbER Take 650 mg by mouth four (4) times daily.  naloxone (NARCAN) 4 mg/actuation nasal spray Use 1 spray intranasally, then discard. Repeat with new spray every 2 min as needed for opioid overdose symptoms, alternating nostrils. 1 Each 0    carboxymethylcellulose sodium (Refresh Tears) 0.5 % drop ophthalmic solution Administer 1 Drop to both eyes two (2) times a day.  levothyroxine (SYNTHROID) 150 mcg tablet TAKE 1 TABLET DAILY (Patient taking differently: TAKE 1 TABLET PO DAILY) 90 Tab 3    diclofenac (Voltaren Arthritis Pain) 1 % gel Apply  to affected area four (4) times daily. Apply 4 inches 4 times a day to affected area prn 350 g 3    mometasone (NASONEX) 50 mcg/actuation nasal spray 1 [place] by Both Nostrils route nightly.  hydrocortisone (HYTONE) 2.5 % topical cream Apply 1 g to affected area two (2) times daily as needed for PRN Reason (Other) (rash on face). use thin layer       lithium CR (ESKALITH CR) 450 mg CR tablet Take 450 mg by mouth two (2) times a day.  lamotrigine (LAMICTAL) 150 mg tablet Take 150 mg by mouth two (2) times a day.  quetiapine (SEROQUEL) 100 mg tablet Take 200 mg by mouth nightly. Exam:  Visit Vitals  /78   Pulse 70   Resp 18   Ht 6' 3\" (1.905 m)   Wt 183 lb (83 kg)   SpO2 99%   BMI 22.87 kg/m²     NEUROLOGICAL EXAM:  General: Awake, alert, speech fluent. Oriented to self, place, date. Knows potus. Serial 7's intact.   CN: PERRL, EOMI without nystagmus, VFF to confrontation, facial sensation and strength are normal and symmetric, hearing is intact to finger rub bilaterally, palate and tongue movements are intact and symmetric. Motor: Normal tone, bulk and strength bilaterally. Reflexes: 2/4 and symmetric, plantar stimulation is flexor. Coordination: FNF, SUMA, HTS intact. +Bradykinesia finger taps b/l, +rigidity RUE, no significant tremors. Sensation: LT intact throughout. Gait: Slightly slow, shuffling with multi-step turns. Decreased R arm swing. LABS  Results for orders placed or performed in visit on 81/47/53   METABOLIC PANEL, COMPREHENSIVE   Result Value Ref Range    Glucose 98 65 - 99 mg/dL    BUN 16 8 - 27 mg/dL    Creatinine 1.34 (H) 0.76 - 1.27 mg/dL    GFR est non-AA 53 (L) >59 mL/min/1.73    GFR est AA 61 >59 mL/min/1.73    BUN/Creatinine ratio 12 10 - 24    Sodium 142 134 - 144 mmol/L    Potassium 4.3 3.5 - 5.2 mmol/L    Chloride 107 (H) 96 - 106 mmol/L    CO2 24 20 - 29 mmol/L    Calcium 10.3 (H) 8.6 - 10.2 mg/dL    Protein, total 6.9 6.0 - 8.5 g/dL    Albumin 4.8 (H) 3.7 - 4.7 g/dL    GLOBULIN, TOTAL 2.1 1.5 - 4.5 g/dL    A-G Ratio 2.3 (H) 1.2 - 2.2    Bilirubin, total 0.7 0.0 - 1.2 mg/dL    Alk.  phosphatase 56 48 - 121 IU/L    AST (SGOT) 12 0 - 40 IU/L    ALT (SGPT) 10 0 - 44 IU/L   LIPID PANEL   Result Value Ref Range    Cholesterol, total 162 100 - 199 mg/dL    Triglyceride 76 0 - 149 mg/dL    HDL Cholesterol 42 >39 mg/dL    VLDL, calculated 15 5 - 40 mg/dL    LDL, calculated 105 (H) 0 - 99 mg/dL   TSH 3RD GENERATION   Result Value Ref Range    TSH 1.450 0.450 - 4.500 uIU/mL   T4, FREE   Result Value Ref Range    T4, Free 1.41 0.82 - 1.77 ng/dL   PSA, DIAGNOSTIC (PROSTATE SPECIFIC AG)   Result Value Ref Range    Prostate Specific Ag 1.2 0.0 - 4.0 ng/mL   LITHIUM   Result Value Ref Range    Lithium level 1.0 0.5 - 1.2 mmol/L   AMB POC URINALYSIS DIP STICK AUTO W/O MICRO   Result Value Ref Range    Color (UA POC) Yellow     Clarity (UA POC) Clear     Glucose (UA POC) Negative Negative    Bilirubin (UA POC) Negative Negative    Ketones (UA POC) Negative Negative    Specific gravity (UA POC) 1.025 1.001 - 1.035    Blood (UA POC) Trace Negative    pH (UA POC) 7.0 4.6 - 8.0    Protein (UA POC) Negative Negative    Urobilinogen (UA POC) 0.2 mg/dL 0.2 - 1    Nitrites (UA POC) Negative Negative    Leukocyte esterase (UA POC) Negative Negative   AMB POC COMPLETE CBC,AUTOMATED ENTER   Result Value Ref Range    WBC (POC) 5.6 4.5 - 10.5 K/uL    LYMPHOCYTES (POC) 32.6 20.5 - 51.1 %    MONOCYTES (POC)      GRANULOCYTES (POC)      ABS. LYMPHS (POC) 1.8 1.2 - 3.4 K/uL    ABS. MONOS (POC)      ABS. GRANS (POC)      RBC (POC) 4.24 4.00 - 6.00 M/uL    HGB (POC) 13.2 11 - 18 g/dL    HCT (POC) 40.3 35.0 - 60.0 %    MCV (POC) 95.0 80.0 - 99.9 fL    MCH (POC)      MCHC (POC)      RDW (POC)      PLATELET (POC) 289 431 - 450 K/uL    MPV (POC)         IMAGING:  MRI Results (most recent):  No results found for this or any previous visit. Assessment:     Encounter Diagnoses     ICD-10-CM ICD-9-CM   1. Neuroleptic induced parkinsonism (HCC)  G21.11 332.1     E939.3   2. Tremors of nervous system  R25.1 781.0   3. Complaints of memory disturbance  R41.3 66.93   66year old pleasant male with a h/o bipolar disorder, hypothyroidism here for f/u of reported short term recall deficits, intermittent tremors, more recent gait instability s/p L hip surgery 5/2021. No evidence of lithium toxicity or contributing thyroid abnormalities on review of recent labs that would otherwise contribute to tremors. Examination today reveals mild to moderate bradykinesia on finger taps with RUE rigidity, masked facial appearance, slow slightly shuffling gait with multi-step turns. This appears consistent with evolving Parkinsonism which is likely secondary to chronic neuroleptic use. Advise f/u with Psychiatry for ongoing management. Will proceed with formal neuropsychologic testing for further evaluation of cognitive complaints. Recent MMSE was within normal range.    Plan:   Neuropsychologic evaluation   F/U Psychiatry regarding neuroleptic-induced Parkinsonism    Follow-up and Dispositions    · Return in about 3 months (around 2/9/2022). \  I have discussed the diagnosis with the patient today and the intended plan as seen in the above orders with both the patient as well as referring provider and/or PCP via electronic correspondence. The patient has received an after-visit summary and questions were answered concerning future plans. I have discussed medication side effects and warnings with the patient as well.       Signed:  Liz Bender DO  11/9/2021    CC:  Dr. Arvin Templeton

## 2021-11-09 NOTE — LETTER
11/9/2021 10:21 AM    Patient:  Bessie Krabbe   YOB: 1950  Date of Visit: 11/9/2021      Dear Lashay Coyle MD  [address] [place], [place] [address]w [phone]  Via Fax: [phone]: I recently had the pleasure of seeing Mr. Brando Snyder for evaluation of tremors, gait instability. Below are the relevant portions of my assessment and plan of care. If you have questions, please do not hesitate to call me. I look forward to following Mr. Salvatore Harrison along with you.         Sincerely,      Stephanie Del nAgel, DO

## 2021-11-09 NOTE — PROGRESS NOTES
Mamta Mckinney is a 66 y.o. male  HIPAA verified by two patient identifiers. Health Maintenance Due   Topic    Flu Vaccine (1)     Chief Complaint   Patient presents with    Follow-up     Tremors/Memory     Visit Vitals  /78   Pulse 70   Resp 18   Ht 6' 3\" (1.905 m)   Wt 183 lb (83 kg)   SpO2 99%   BMI 22.87 kg/m²       Pain Scale: 0 - No pain/10  Pain Location:   1. Have you been to the ER, urgent care clinic since your last visit? Hospitalized since your last visit? No    2. Have you seen or consulted any other health care providers outside of the [address] [place] [address] since your last visit? Include any pap smears or colon screening.  No

## 2021-12-08 ENCOUNTER — TELEPHONE (OUTPATIENT)
Dept: NEUROLOGY | Age: 71
End: 2021-12-08

## 2022-03-18 ENCOUNTER — OFFICE VISIT (OUTPATIENT)
Dept: NEUROLOGY | Age: 72
End: 2022-03-18
Payer: MEDICARE

## 2022-03-18 VITALS
RESPIRATION RATE: 16 BRPM | HEART RATE: 61 BPM | BODY MASS INDEX: 23 KG/M2 | WEIGHT: 185 LBS | DIASTOLIC BLOOD PRESSURE: 60 MMHG | OXYGEN SATURATION: 99 % | SYSTOLIC BLOOD PRESSURE: 148 MMHG | HEIGHT: 75 IN

## 2022-03-18 DIAGNOSIS — R41.3 COMPLAINTS OF MEMORY DISTURBANCE: ICD-10-CM

## 2022-03-18 DIAGNOSIS — R25.1 TREMORS OF NERVOUS SYSTEM: ICD-10-CM

## 2022-03-18 DIAGNOSIS — G21.11 NEUROLEPTIC INDUCED PARKINSONISM (HCC): Primary | ICD-10-CM

## 2022-03-18 PROBLEM — J34.2 DEVIATED NASAL SEPTUM: Status: ACTIVE | Noted: 2019-12-14

## 2022-03-18 PROCEDURE — 3017F COLORECTAL CA SCREEN DOC REV: CPT | Performed by: PSYCHIATRY & NEUROLOGY

## 2022-03-18 PROCEDURE — G8427 DOCREV CUR MEDS BY ELIG CLIN: HCPCS | Performed by: PSYCHIATRY & NEUROLOGY

## 2022-03-18 PROCEDURE — G8420 CALC BMI NORM PARAMETERS: HCPCS | Performed by: PSYCHIATRY & NEUROLOGY

## 2022-03-18 PROCEDURE — 99214 OFFICE O/P EST MOD 30 MIN: CPT | Performed by: PSYCHIATRY & NEUROLOGY

## 2022-03-18 PROCEDURE — G8536 NO DOC ELDER MAL SCRN: HCPCS | Performed by: PSYCHIATRY & NEUROLOGY

## 2022-03-18 PROCEDURE — G8753 SYS BP > OR = 140: HCPCS | Performed by: PSYCHIATRY & NEUROLOGY

## 2022-03-18 PROCEDURE — G8754 DIAS BP LESS 90: HCPCS | Performed by: PSYCHIATRY & NEUROLOGY

## 2022-03-18 PROCEDURE — G9717 DOC PT DX DEP/BP F/U NT REQ: HCPCS | Performed by: PSYCHIATRY & NEUROLOGY

## 2022-03-18 PROCEDURE — 1101F PT FALLS ASSESS-DOCD LE1/YR: CPT | Performed by: PSYCHIATRY & NEUROLOGY

## 2022-03-18 NOTE — PROGRESS NOTES
Neurology Clinic Follow up Note    Patient ID:  Alvina Marx  [id number]  66 y.o.  1950      Mr. Magdy Ferguson is here for follow up today of memory impairment. Last Appointment With Me:  11/9/2021      Interval History:   He returns for follow up today due to tremors. He notes hand tremors L>R, more notable with activity. These have improved since last visit. He is able to eat and use utensils more easily now. Lithium was reduced since last visit. Denies bradykinesia, freezing episodes. He is still having more unsteady gait/imbalance since his hip replacement 5/2021 but overall improved. In regards to his short term memory, he feels this is stable from last visit. He still repeats himself on occasion per his wife. Neuropsychologic testing is pending. Ability to function:  Driving: Yes  Finances: Handles on his own, no issues  Cooking: No  Manages own medication: Yes, reports rare issues with taking PM medication in the AM.   Residing: at home with his wife      PMHx/ PSHx/ FHx/ SHx:  Reviewed and unchanged previous visit. Past Medical History:   Diagnosis Date    Arthritis     Bipolar affective disorder ([facility] [address]     Deviated nasal septum 12/14/2019    ED (erectile dysfunction)     Essential hypertension 5/3/2021    GERD (gastroesophageal reflux disease)     Gout     HTN (hypertension)     Hyperlipidemia 8/5/2011    Hypothyroidism     Inguinal hernia 11/27/2012    Rupture Achilles tendon     left; resolved.  Sleep apnea 8/31/2011    does not use CPAP/uses dental appliance         ROS:  Comprehensive review of systems negative except for as noted above. Objective:       Meds:  Current Outpatient Medications   Medication Sig Dispense Refill    atorvastatin (LIPITOR) 10 mg tablet Take 1 Tablet by mouth daily. 90 Tablet 3    lithium carbonate 300 mg capsule Take 300 mg by mouth two (2) times daily (with meals).       cyanocobalamin (Vitamin B-12) 100 mcg tablet Take 100 mcg by mouth daily.  vitamin e (E GEMS) 1,000 unit capsule Take 1,000 Units by mouth daily.  ergocalciferol (Vitamin D2) 1,250 mcg (50,000 unit) capsule Take 50,000 Units by mouth.  sildenafiL, pulmonary hypertension, (REVATIO) 20 mg tablet TAKE 1-3 TABLETS BY MOUTH 1 HOUR BEFORE RELATIONS. ** USE INSTEAD OF CIALIS ** 30 Tablet 11    amLODIPine (Norvasc) 5 mg tablet Take 1 Tablet by mouth daily. 90 Tablet 3    acetaminophen (TYLENOL) 650 mg TbER Take 650 mg by mouth four (4) times daily.  naloxone (NARCAN) 4 mg/actuation nasal spray Use 1 spray intranasally, then discard. Repeat with new spray every 2 min as needed for opioid overdose symptoms, alternating nostrils. 1 Each 0    carboxymethylcellulose sodium (Refresh Tears) 0.5 % drop ophthalmic solution Administer 1 Drop to both eyes two (2) times a day.  levothyroxine (SYNTHROID) 150 mcg tablet TAKE 1 TABLET DAILY (Patient taking differently: TAKE 1 TABLET PO DAILY) 90 Tab 3    diclofenac (Voltaren Arthritis Pain) 1 % gel Apply  to affected area four (4) times daily. Apply 4 inches 4 times a day to affected area prn 350 g 3    mometasone (NASONEX) 50 mcg/actuation nasal spray 1 [place] by Both Nostrils route nightly.  hydrocortisone (HYTONE) 2.5 % topical cream Apply 1 g to affected area two (2) times daily as needed for PRN Reason (Other) (rash on face). use thin layer       lamotrigine (LAMICTAL) 150 mg tablet Take 150 mg by mouth two (2) times a day.  quetiapine (SEROQUEL) 100 mg tablet Take 200 mg by mouth nightly. Exam:  Visit Vitals  BP (!) 148/60   Pulse 61   Resp 16   Ht 6' 3\" (1.905 m)   Wt 185 lb (83.9 kg)   SpO2 99%   BMI 23.12 kg/m²     NEUROLOGICAL EXAM:  General: Awake, alert, speech fluent. Oriented to self, place, date. Serial 7's intact.    CN: PERRL, EOMI without nystagmus, VFF to confrontation, facial sensation and strength are normal and symmetric, hearing is intact to finger rub bilaterally, palate and tongue movements are intact and symmetric. Motor: Normal tone, bulk and strength bilaterally. Reflexes: 2/4 and symmetric, plantar stimulation is flexor. Coordination: FNF, SUMA, HTS intact. No bradykinesia/rigidity, no significant tremors. Sensation: LT intact throughout. Gait: Steady. Decreased R arm swing. LABS  Results for orders placed or performed in visit on 02/25/22   LITHIUM   Result Value Ref Range    Lithium level 0.7 0.5 - 1.2 mmol/L   CBC WITH AUTOMATED DIFF   Result Value Ref Range    WBC 6.1 3.4 - 10.8 x10E3/uL    RBC 4.35 4.14 - 5.80 x10E6/uL    HGB 13.9 13.0 - 17.7 g/dL    HCT 41.4 37.5 - 51.0 %    MCV 95 79 - 97 fL    MCH 32.0 26.6 - 33.0 pg    MCHC 33.6 31.5 - 35.7 g/dL    RDW 11.2 (L) 11.6 - 15.4 %    PLATELET 718 707 - 065 x10E3/uL    NEUTROPHILS 54 Not Estab. %    Lymphocytes 33 Not Estab. %    MONOCYTES 10 Not Estab. %    EOSINOPHILS 2 Not Estab. %    BASOPHILS 1 Not Estab. %    ABS. NEUTROPHILS 3.4 1.4 - 7.0 x10E3/uL    Abs Lymphocytes 2.0 0.7 - 3.1 x10E3/uL    ABS. MONOCYTES 0.6 0.1 - 0.9 x10E3/uL    ABS. EOSINOPHILS 0.1 0.0 - 0.4 x10E3/uL    ABS. BASOPHILS 0.0 0.0 - 0.2 x10E3/uL    IMMATURE GRANULOCYTES 0 Not Estab. %    ABS. IMM. GRANS. 0.0 0.0 - 0.1 [id number]/HU   METABOLIC PANEL, COMPREHENSIVE   Result Value Ref Range    Glucose 90 65 - 99 mg/dL    BUN 16 8 - 27 mg/dL    Creatinine 1.24 0.76 - 1.27 mg/dL    GFR est non-AA 58 (L) >59 mL/min/1.73    GFR est AA 67 >59 mL/min/1.73    BUN/Creatinine ratio 13 10 - 24    Sodium 141 134 - 144 mmol/L    Potassium 4.5 3.5 - 5.2 mmol/L    Chloride 105 96 - 106 mmol/L    CO2 20 20 - 29 mmol/L    Calcium 10.2 8.6 - 10.2 mg/dL    Protein, total 7.0 6.0 - 8.5 g/dL    Albumin 4.8 (H) 3.7 - 4.7 g/dL    GLOBULIN, TOTAL 2.2 1.5 - 4.5 g/dL    A-G Ratio 2.2 1.2 - 2.2    Bilirubin, total 0.4 0.0 - 1.2 mg/dL    Alk.  phosphatase 60 44 - 121 IU/L    AST (SGOT) 22 0 - 40 IU/L    ALT (SGPT) 18 0 - 44 IU/L   LIPID PANEL   Result Value Ref Range    Cholesterol, total 175 100 - 199 mg/dL    Triglyceride 60 0 - 149 mg/dL    HDL Cholesterol 41 >39 mg/dL    VLDL, calculated 12 5 - 40 mg/dL    LDL, calculated 122 (H) 0 - 99 mg/dL       IMAGING:  MRI Results (most recent):  No results found for this or any previous visit. Assessment:     Encounter Diagnoses     ICD-10-CM ICD-9-CM   1. Neuroleptic induced parkinsonism (HCC)  G21.11 332.1     E939.3   2. Tremors of nervous system  R25.1 781.0   3. Complaints of memory disturbance  R41.3 66.93   66year old pleasant male with a h/o bipolar disorder, hypothyroidism here for f/u of reported short term recall deficits, intermittent tremors, more recent gait instability s/p L hip surgery 5/2021. No evidence of lithium toxicity or contributing thyroid abnormalities on review of recent labs that would otherwise contribute to tremors. Initial examination revealed mild to moderate bradykinesia on finger taps with RUE rigidity, masked facial appearance, slow slightly shuffling gait with multi-step turns. Findings are consistent with evolving Parkinsonism likely secondary to chronic neuroleptic use. Since last evaluation, he has noted improvement with tremor and gait after reduction in lithium dosing. Examination likewise appears improved today. In regards to his short term memory deficits, we are awaiting completion of formal neuropsychologic testing scheduled for 7/2022. Will follow up thereafter. Plan:   Neuropsychologic evaluation pending  F/U Psychiatry regarding neuroleptic-induced Parkinsonism       Follow-up and Dispositions    · Return in about 6 months (around 9/18/2022). I have discussed the diagnosis with the patient today and the intended plan as seen in the above orders with both the patient as well as referring provider and/or PCP via electronic correspondence. The patient has received an after-visit summary and questions were answered concerning future plans.  I have discussed medication side effects and warnings with the patient as well.       Signed:  Arcadio Bridges DO  3/18/2022

## 2022-03-19 PROBLEM — M16.12 OSTEOARTHRITIS OF LEFT HIP: Status: ACTIVE | Noted: 2021-05-12

## 2022-03-20 PROBLEM — I10 ESSENTIAL HYPERTENSION: Status: ACTIVE | Noted: 2021-05-03

## 2022-06-02 ENCOUNTER — OFFICE VISIT (OUTPATIENT)
Dept: ORTHOPEDIC SURGERY | Age: 72
End: 2022-06-02
Payer: MEDICARE

## 2022-06-02 VITALS — WEIGHT: 185 LBS | BODY MASS INDEX: 23 KG/M2 | HEIGHT: 75 IN

## 2022-06-02 DIAGNOSIS — M54.2 NECK PAIN: Primary | ICD-10-CM

## 2022-06-02 DIAGNOSIS — M17.11 PRIMARY OSTEOARTHRITIS OF RIGHT KNEE: ICD-10-CM

## 2022-06-02 DIAGNOSIS — M70.62 GREATER TROCHANTERIC BURSITIS OF LEFT HIP: ICD-10-CM

## 2022-06-02 PROBLEM — N18.30 CHRONIC RENAL DISEASE, STAGE III (HCC): Status: ACTIVE | Noted: 2022-06-02

## 2022-06-02 PROCEDURE — 1123F ACP DISCUSS/DSCN MKR DOCD: CPT | Performed by: ORTHOPAEDIC SURGERY

## 2022-06-02 PROCEDURE — G8420 CALC BMI NORM PARAMETERS: HCPCS | Performed by: ORTHOPAEDIC SURGERY

## 2022-06-02 PROCEDURE — G8536 NO DOC ELDER MAL SCRN: HCPCS | Performed by: ORTHOPAEDIC SURGERY

## 2022-06-02 PROCEDURE — G9717 DOC PT DX DEP/BP F/U NT REQ: HCPCS | Performed by: ORTHOPAEDIC SURGERY

## 2022-06-02 PROCEDURE — 1101F PT FALLS ASSESS-DOCD LE1/YR: CPT | Performed by: ORTHOPAEDIC SURGERY

## 2022-06-02 PROCEDURE — G8756 NO BP MEASURE DOC: HCPCS | Performed by: ORTHOPAEDIC SURGERY

## 2022-06-02 PROCEDURE — 99213 OFFICE O/P EST LOW 20 MIN: CPT | Performed by: ORTHOPAEDIC SURGERY

## 2022-06-02 PROCEDURE — G8427 DOCREV CUR MEDS BY ELIG CLIN: HCPCS | Performed by: ORTHOPAEDIC SURGERY

## 2022-06-02 PROCEDURE — 3017F COLORECTAL CA SCREEN DOC REV: CPT | Performed by: ORTHOPAEDIC SURGERY

## 2022-06-02 RX ORDER — LITHIUM CARBONATE 300 MG
TABLET ORAL
COMMUNITY
Start: 2022-05-22 | End: 2022-11-03

## 2022-06-02 NOTE — LETTER
6/2/2022    Patient: Anastasia Cardona   YOB: 1950   Date of Visit: 6/2/2022     [address], Susan. \[facility].\"", [address] [place] [address] Alma Gardner    Dear [address], Susan. \[facility].\"", MD,      Thank you for referring Mr. Abhilash Lei to [facility] for evaluation. My notes for this consultation are attached. If you have questions, please do not hesitate to call me. I look forward to following your patient along with you.       Sincerely,    Rosa Mccracken, DO

## 2022-06-02 NOTE — PROGRESS NOTES
Gypsy Dunn (: 1950) is a 67 y.o. male, established patient, here for evaluation of the following chief complaint(s):  Hip Pain (left hip), Knee Pain (right knee), and Neck Pain       ASSESSMENT/PLAN:  Below is the assessment and plan developed based on review of pertinent history, physical exam, labs, studies, and medications. We will start a physical therapy regimen for his cervicalgia. They may consider dry needling if needed. I will plan to see him back in the next 6 weeks as needed. We discussed continuing with the topical anti-inflammatory for his hip and knee. He can follow with Dr. Shana Hernandez if needed in the future. 1. Neck pain  -     REFERRAL TO PHYSICAL THERAPY  2. Greater trochanteric bursitis of left hip  3. Primary osteoarthritis of right knee      Return in about 6 weeks (around 2022), or if symptoms worsen or fail to improve. SUBJECTIVE/OBJECTIVE:  Gypsy Dunn (: 1950) is a 67 y.o. male. He notes neck pain, lateral left hip pain, and right knee pain. Previous corticosteroid injection for the right knee only lasted a couple of days. He has had a left hip replacement and notes that it has done well overall but he has some mild lateral sided hip pain at this time. He notes that his primary symptoms are related to his neck stiffness and muscle spasms. He has been told by his primary care that he has to avoid anti-inflammatories. No Known Allergies    Current Outpatient Medications   Medication Sig    lithium carbonate 300 mg tablet     atorvastatin (LIPITOR) 10 mg tablet Take 1 Tablet by mouth daily.  cyanocobalamin (Vitamin B-12) 100 mcg tablet Take 100 mcg by mouth daily.  ergocalciferol (Vitamin D2) 1,250 mcg (50,000 unit) capsule Take 50,000 Units by mouth.     sildenafiL, pulmonary hypertension, (REVATIO) 20 mg tablet TAKE 1-3 TABLETS BY MOUTH 1 HOUR BEFORE RELATIONS. ** USE INSTEAD OF CIALIS **    amLODIPine (Norvasc) 5 mg tablet Take 1 Tablet by mouth daily.  naloxone (NARCAN) 4 mg/actuation nasal spray Use 1 spray intranasally, then discard. Repeat with new spray every 2 min as needed for opioid overdose symptoms, alternating nostrils.  carboxymethylcellulose sodium (Refresh Tears) 0.5 % drop ophthalmic solution Administer 1 Drop to both eyes two (2) times a day.  levothyroxine (SYNTHROID) 150 mcg tablet TAKE 1 TABLET DAILY (Patient taking differently: TAKE 1 TABLET PO DAILY)    diclofenac (Voltaren Arthritis Pain) 1 % gel Apply  to affected area four (4) times daily. Apply 4 inches 4 times a day to affected area prn    mometasone (NASONEX) 50 mcg/actuation nasal spray 1 [place] by Both Nostrils route nightly.  hydrocortisone (HYTONE) 2.5 % topical cream Apply 1 g to affected area two (2) times daily as needed for PRN Reason (Other) (rash on face). use thin layer     lamotrigine (LAMICTAL) 150 mg tablet Take 150 mg by mouth two (2) times a day.  quetiapine (SEROQUEL) 100 mg tablet Take 200 mg by mouth nightly. No current facility-administered medications for this visit.        Social History     Socioeconomic History    Marital status:      Spouse name: Not on file    Number of children: Not on file    Years of education: Not on file    Highest education level: Not on file   Occupational History    Not on file   Tobacco Use    Smoking status: Former Smoker    Smokeless tobacco: Never Used    Tobacco comment: quit smoking cigarettes/pipe 40 yrs ago   Vaping Use    Vaping Use: Never used   Substance and Sexual Activity    Alcohol use: Yes     Comment:  2-3   drinks per week     Drug use: Never    Sexual activity: Not Currently   Other Topics Concern     Service Not Asked    Blood Transfusions Not Asked    Caffeine Concern Not Asked    Occupational Exposure Not Asked    Hobby Hazards Not Asked    Sleep Concern Not Asked    Stress Concern Not Asked    Weight Concern Not Asked    Special Diet Not Asked    Back Care Not Asked    Exercise Not Asked    Bike Helmet Not Asked    Seat Belt Not Asked    Self-Exams Not Asked   Social History Narrative    ** Merged History Encounter **          Social Determinants of Health     Financial Resource Strain:     Difficulty of Paying Living Expenses: Not on file   Food Insecurity:     Worried About Running Out of Food in the Last Year: Not on file    Norberto of Food in the Last Year: Not on file   Transportation Needs:     Lack of Transportation (Medical): Not on file    Lack of Transportation (Non-Medical):  Not on file   Physical Activity:     Days of Exercise per Week: Not on file    Minutes of Exercise per Session: Not on file   Stress:     Feeling of Stress : Not on file   Social Connections:     Frequency of Communication with Friends and Family: Not on file    Frequency of Social Gatherings with Friends and Family: Not on file    Attends Worship Services: Not on file    Active Member of [address] [place] [address] or Organizations: Not on file    Attends Club or Organization Meetings: Not on file    Marital Status: Not on file   Intimate Partner Violence:     Fear of Current or Ex-Partner: Not on file    Emotionally Abused: Not on file    Physically Abused: Not on file    Sexually Abused: Not on file   Housing Stability:     Unable to Pay for Housing in the Last Year: Not on file    Number of [place] in the Last Year: Not on file    Unstable Housing in the Last Year: Not on file       Past Surgical History:   Procedure Laterality Date    COLONOSCOPY N/A 5/22/2018    COLONOSCOPY performed by Renny Anna MD at [facility] 59. N/A 10/19/2021    COLONOSCOPY (bringing) performed by Selma Peabody, MD at [address] HX COLONOSCOPY  10/13/14    polypectomy, f/u 3 y . dr. Regis Gowers Right     tendon repair of thumb    HX ORTHOPAEDIC      left hip    HX OTHER SURGICAL  2015    right inguinal hernia repair    HX OTHER SURGICAL      colonoscopy - hemorrhoids    HX TONSILLECTOMY  25's       Family History   Problem Relation Age of Onset    Other Father         Snoring, insomnia    Suicide Father     Heart Attack Mother 79    Bipolar Disorder Mother     No Known Problems Sister     Bipolar Disorder Maternal Aunt     Anesth Problems Neg Hx                REVIEW OF SYSTEMS:    Patient denies any recent fever, chills, nausea, vomiting, chest pain, or shortness of breath. Vitals:  Ht 6' 3\" (1.905 m)   Wt 185 lb (83.9 kg)   BMI 23.12 kg/m²    Body mass index is 23.12 kg/m². PHYSICAL EXAM:  General exam: Patient is awake, alert, and oriented x3. Well-appearing. No acute distress. Ambulates with an antalgic gait    Neck: There is tenderness to palpation in the paraspinal region. No obvious midline tenderness to palpation. There is stiffness with flexion and extension of the cervical spine. Negative Spurling's exam.  No erythema or ecchymosis. There is normal range of motion of both shoulders and minimal pain with impingement testing including Burnham exam.  Grossly neurovascular and sensory intact. Left hip: Neurovascular and sensory intact. There is point tenderness palpation at the lateral hip in the area of the trochanteric bursa. Mild swelling. No erythema or ecchymosis. There is normal range of motion of the hip with some lateral sided pain with full flexion and internal rotation. Normal stability. No crepitus. Right knee: Neurovascular and sensory intact. Effusion is present. Crepitus is noted with range of motion. There is tenderness palpation at the medial and lateral joint line. Bismark's maneuver creates mild pain. Normal stability on ligamentous testing including Lachman's exam.  Stable anterior and posterior drawer. No erythema or ecchymosis.         IMAGING:    XR Results (most recent):  Results from Hospital Encounter encounter on 05/12/21    XR HIP LT W OR WO PELV  1 VW    Narrative  INDICATION:  Left hip replacement, history of left hip osteoarthritis . Portable AP view of the left hip demonstrates left hip replacement with normal  alignment. Postoperative changes are evident in the soft tissues. Impression  Left hip replacement with normal alignment. XR HIP LT DURING OR PROC    Narrative  INDICATION: Surgery. Left hip osteoarthritis, Compliance only. Impression  Single fluoroscopic AP view of the left hip demonstrates a hip  prosthesis in gross anatomic alignment and without evidence of orthopedic  hardware complication. Results from [place] encounter on 11/05/20    XR INJ ASP LARGE JOINT / BURSA    Narrative  EXAM:  XR INJ ASP LARGE JOINT / BURSA    INDICATION:  Left hip pain    COMPARISON: None. Fluoroscopy dose (air kerma):  37mGy    FINDINGS: The procedure was explained to the patient and verbal and written  informed consent was obtained. The skin was marked and prepped and draped in  sterile fashion and anesthetized with 1% lidocaine. Using fluoroscopic  visualization, a 22-gauge spinal needle was advanced into the left hip joint. The needle position within the joint was confirmed with injection of a small  quantity of Omnipaque-300 and 2 mL of bupivacaine and 1 mL of Kenalog were  injected. The needle was removed and a bandage was applied. The patient  tolerated the procedure well. Patient experienced significant improvement in the  left hip pain. Impression  IMPRESSION: Left hip injected with steroid and anesthetic. Orders Placed This Encounter    REFERRAL TO PHYSICAL THERAPY     Referral Priority:   Routine     Referral Type:   PT/OT/ST     Referral Reason:   Specialty Services Required     Number of Visits Requested:   1              An electronic signature was used to authenticate this note.   -- Ruth Mauricio DO

## 2022-06-06 ENCOUNTER — OFFICE VISIT (OUTPATIENT)
Dept: ORTHOPEDIC SURGERY | Age: 72
End: 2022-06-06
Payer: MEDICARE

## 2022-06-06 DIAGNOSIS — M54.2 NECK PAIN: Primary | ICD-10-CM

## 2022-06-06 DIAGNOSIS — M43.6 STIFFNESS OF CERVICAL SPINE: ICD-10-CM

## 2022-06-06 PROCEDURE — 97161 PT EVAL LOW COMPLEX 20 MIN: CPT | Performed by: PHYSICAL THERAPIST

## 2022-06-06 PROCEDURE — 97140 MANUAL THERAPY 1/> REGIONS: CPT | Performed by: PHYSICAL THERAPIST

## 2022-06-06 NOTE — PROGRESS NOTES
CERVICAL SPINE EVALUATION  Patient Name: Ellen Ellis  Date:2022  : 1950  [x]  Patient  Verified  Payor: Ginette Joy / Plan: [place] MED[place] PART [place] & B / Product Type: Medicare /    Total Treatment Time (min): 50  Total Timed Codes (min): 40  1:1 Treatment Time ( [address] only): 40   Referring Physician:   Matteo Carbajal DO       1. Neck pain  2. Stiffness of cervical spine      SUBJECTIVE  Patient is a 59-year-old male referred to physical therapy by Dr. Lizandro Peoples for neck pain. He is well-known to our clinic as he was seen here previously following left THR in  performed by Dr. Emmy Connor. Reports his neck has bothered him for years but has worsened recently. Located along the left side of his neck into the upper shoulder, no radiating pain or numbness/tingling. VAS 7-2010 at its worst.  Overall pain is fairly constant. He has difficulty turning his head to the left with driving and is unable to sleep on his right side. He is taking diclofenac and using CBD for relief. He is active with walking his dog for exercise. His goals for PT are to decrease his pain. OBJECTIVE  Observations: Holds a forward head and rounded shoulder posture    Palpation: Tenderness and increased muscle tone throughout the left cervical paraspinals, suboccipitals, upper trap    ROM: Able to flex chin to chest, relief reported with overpressure. Extension limited grossly 75% second to stiffness. Left rotation 50 degrees and mildly painful/tightness reported, right 65 degrees. Left side bend 15 degrees and painful, right 25 degrees. Shoulder flexion and abduction is within normal limits and pain-free. Actually more limited with external rotation on the right compared to the left. Internal rotation is symmetrical and pain-free    Fairly guarded with passive rotation, left greater than right. Left relatively limited 50%. There is tightness with bilateral side bend.   AA rotation is within normal limits and pain-free    Strength: Grossly 5/5 throughout bilateral upper extremities    Sensation: Intact    Joint mobility: Pain and hypomobility with PA mobilization to C5-C7    Special tests: Mild discomfort with left Spurling's. Relief with traction    NDI: 15    Treatment:  Performed initial evaluation (20 minutes). Treatment (20 minutes): Cervical traction with suboccipital release, soft tissue mobilization to left-sided cervical paraspinals, upper trap, cervical rotation stretching, manual stretching of left upper trap. Provided and educated patient in home exercise program for cervical spine mobility and posture. We discussed patient goals and collaborated upon a plan of care. Ice posttreatment. HEP:  1. Chin tuck  2. Supine cervical rotation  3. Upper trap stretch  4. Corner stretch      ASSESSMENT    Patient presents with impairments related to posture, cervical spine and shoulder range of motion,  impaired ability to perform ADLs, and drive a car second to left-sided mechanical neck pain. Presentation seems consistent with left-sided facet dysfunction. He will benefit from outpatient physical therapy services to address above limitations and maximize function. Short-term Goals (1 week)  1. Patient will demonstrate independence with home exercise program to enhance recovery. Long-term Goals (4-6 weeks)  1. Patient will report at least 25% decrease in pain to maximize activity tolerance. 2. Patient will demonstrate at least 5 to 10 degree improvement in cervical rotation to help with ability to drive. 3. Patient will report 10% decrease on NDI to improve quality of life. Frequency: 2x per week. 20 visits. Interventions to include but are not limited to joint mobilizations, myofascial release, soft tissue mobilization, therapeutic exercise, neuromuscular reeducation, dry needling, taping, and modalities as indicated.     Randy Lundberg, PT 6/6/2022  9:41 AM    The referring physician has reviewed and approved this evaluation and plan of care as noted by the electronic signature attached to note.

## 2022-06-09 ENCOUNTER — DOCUMENTATION ONLY (OUTPATIENT)
Dept: NEUROLOGY | Age: 72
End: 2022-06-09

## 2022-06-09 NOTE — PROGRESS NOTES
Received fax from [address] [place] [address] Drive will call back to reschedule Neuropsych testing.

## 2022-06-10 ENCOUNTER — OFFICE VISIT (OUTPATIENT)
Dept: ORTHOPEDIC SURGERY | Age: 72
End: 2022-06-10
Payer: MEDICARE

## 2022-06-10 DIAGNOSIS — M54.2 NECK PAIN: Primary | ICD-10-CM

## 2022-06-10 DIAGNOSIS — M43.6 STIFFNESS OF CERVICAL SPINE: ICD-10-CM

## 2022-06-10 PROCEDURE — 97140 MANUAL THERAPY 1/> REGIONS: CPT | Performed by: PHYSICAL THERAPIST

## 2022-06-10 PROCEDURE — 97110 THERAPEUTIC EXERCISES: CPT | Performed by: PHYSICAL THERAPIST

## 2022-06-10 NOTE — PROGRESS NOTES
PT DAILY TREATMENT NOTE    Patient Name: Phoenix Gaming  Date:6/10/2022  : 1950  [x]  Patient  Verified  Payor: Alvinadenae Stella / Plan: [place] MEDICARE PART A & B / Product Type: Medicare /    Total Treatment Time (min): 60  Total Timed Codes (min): 30  1:1 Treatment Time ( [address] only): 30   Referring Physician: Lonna Homans, DO     Treatment Area: Neck    SUBJECTIVE  Patient reports neck is feeling much better with home exercises. OBJECTIVE  Modality:   []  E-Stim: type _ x _ min     []att   []unatt   []w/US   []w/ice   []w/heat  []  Ultrasound: []cont   []pulse    _ W/cm2 x _  min   []1MHz   []3MHz  []  Ice pack _  min       []  Hot pack _  min       []  Other:     Therapeutic Exercise: (minutes: 40 ) One-on-one 10  [x] see exercise log      Added/Changed Exercises:  []  Added:   []  Changed:       Manual Therapy: (minutes: 20)  Cervical traction with suboccipital release, soft tissue mobilization to left-sided cervical paraspinals, upper trap. Cervical rotation stretching, left upper trap stretching      Patient Education: [x] Review HEP    [] Progressed/Changed HEP:      Other Objective/Functional Measures:     ASSESSMENT  []  See Plan of Care  []  See progress note/recertification  [x]  Patient will continue to benefit from skilled therapy to address remaining functional deficits:     Subjective improvement in left-sided neck pain since starting home exercise program this week. He is less tenderness and soft tissue restrictions throughout the left-sided musculature. Does require cueing for proper performance of mobility and postural strengthening exercises. Follow again next week. ICD-10-CM ICD-9-CM    1. Neck pain  M54.2 723.1    2.  Stiffness of cervical spine  M43.6 723.5        PLAN  [x] Progress as tolerated under current plan towards long-term goals  [] Discharge  [] Other:        PT Exercise Log         Activity/Exercise Date  06/10/22       UBE x6' x     Chin tuck x      Supine mid trap x     Rows x     Lat pulldown x      T-spine chair extension x     Corner stretch x                                                                     Return in about 3 days (around 6/13/2022) for Continued skilled physical therapy.     Julio César Pastrana, PT 6/10/2022  8:57 AM

## 2022-06-13 ENCOUNTER — OFFICE VISIT (OUTPATIENT)
Dept: ORTHOPEDIC SURGERY | Age: 72
End: 2022-06-13
Payer: MEDICARE

## 2022-06-13 DIAGNOSIS — M43.6 STIFFNESS OF CERVICAL SPINE: ICD-10-CM

## 2022-06-13 DIAGNOSIS — M54.2 NECK PAIN: Primary | ICD-10-CM

## 2022-06-13 PROCEDURE — 97110 THERAPEUTIC EXERCISES: CPT | Performed by: PHYSICAL THERAPIST

## 2022-06-13 PROCEDURE — 97140 MANUAL THERAPY 1/> REGIONS: CPT | Performed by: PHYSICAL THERAPIST

## 2022-06-13 NOTE — PROGRESS NOTES
PT DAILY TREATMENT NOTE    Patient Name: Zuleyka Fraction  Date:2022  : 1950  [x]  Patient  Verified  Payor: Karen Kern / Plan: [place] MEDICARE PART A & B / Product Type: Medicare /    Total Treatment Time (min): 50  Total Timed Codes (min): 50  1:1 Treatment Time ( [address] only): 50  Referring Physician: Nancy Mckinley DO     Treatment Area: Neck    SUBJECTIVE  Patient reports he slept through the night last night and feels his posture is better. Still having some tightness along the left side at times. OBJECTIVE  Modality:   []  E-Stim: type _ x _ min     []att   []unatt   []w/US   []w/ice   []w/heat  []  Ultrasound: []cont   []pulse    _ W/cm2 x _  min   []1MHz   []3MHz  [x]  Ice pack 10 min       []  Hot pack _  min       []  Other:     Therapeutic Exercise: (minutes: 30) One-on-one throughout  [x] see exercise log      Added/Changed Exercises:  []  Added:   []  Changed:       Manual Therapy: (minutes: 20)  Cervical traction with suboccipital release, soft tissue mobilization to left-sided cervical paraspinals, upper trap. Cervical rotation stretching, left upper trap stretching      Patient Education: [x] Review HEP    [] Progressed/Changed HEP:      Other Objective/Functional Measures:     ASSESSMENT  []  See Plan of Care  []  See progress note/recertification  [x]  Patient will continue to benefit from skilled therapy to address remaining functional deficits:     Continued subjective improvement in left-sided neck pain. His rotation is improving having less pain at endrange. Follow again later this week. ICD-10-CM ICD-9-CM    1. Neck pain  M54.2 723.1    2.  Stiffness of cervical spine  M43.6 723.5        PLAN  [x] Progress as tolerated under current plan towards long-term goals  [] Discharge  [] Other:        PT Exercise Log         Activity/Exercise Date  22       UBE x6' x     Chin tuck x      Supine mid trap x     Rows x     Lat pulldown x      T-spine chair extension x Corner stretch x                                                                     Return in about 3 days (around 6/16/2022) for Continued skilled physical therapy.     Dora Mason, PT 6/13/2022  8:57 AM

## 2022-06-16 ENCOUNTER — OFFICE VISIT (OUTPATIENT)
Dept: ORTHOPEDIC SURGERY | Age: 72
End: 2022-06-16
Payer: MEDICARE

## 2022-06-16 DIAGNOSIS — M43.6 STIFFNESS OF CERVICAL SPINE: ICD-10-CM

## 2022-06-16 DIAGNOSIS — M54.2 NECK PAIN: Primary | ICD-10-CM

## 2022-06-16 PROCEDURE — 97110 THERAPEUTIC EXERCISES: CPT | Performed by: PHYSICAL THERAPIST

## 2022-06-16 PROCEDURE — 97140 MANUAL THERAPY 1/> REGIONS: CPT | Performed by: PHYSICAL THERAPIST

## 2022-06-16 NOTE — PROGRESS NOTES
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 OBJECTIVE  Modality:   []  E-Stim: type _ x _ min     []att   []unatt   []w/US   []w/ice   []w/heat  []  Ultrasound: []cont   []pulse    _ W/cm2 x _  min   []1MHz   []3MHz  [x]  Ice pack 10 min       []  Hot pack _  min       []  Other:     Therapeutic Exercise: (minutes: 30) One-on-one throughout  [x] see exercise log      Added/Changed Exercises:  [x]  Added: Bilateral ER  []  Changed:       Manual Therapy: (minutes: 15)  Cervical traction with suboccipital release, soft tissue mobilization to left-sided cervical paraspinals, upper trap. Cervical rotation stretching, left upper trap stretching      Patient Education: [x] Review HEP    [] Progressed/Changed HEP:      Other Objective/Functional Measures:     ASSESSMENT  []  See Plan of Care  []  See progress note/recertification  [x]  Patient will continue to benefit from skilled therapy to address remaining functional deficits:     A bit more sore since increasing his physical activity this week but still seems to be responding well to current plan of care. He is having less pain with rotation. Follow again early next week. ICD-10-CM ICD-9-CM    1. Neck pain  M54.2 723.1    2.  Stiffness of cervical spine  M43.6 723.5        PLAN  [x] Progress as tolerated under current plan towards long-term goals  [] Discharge  [] Other:        PT Exercise Log         Activity/Exercise Date  22       UBE x6' x     Chin tuck x      Supine mid trap x     Rows x     Lat pulldown x      T-spine chair extension x     Corner stretch x     Bilateral ER x                                                               Return in about 4 days (around 6/20/2022) for Continued skilled physical therapy.     Domenica Chinchilla, PT 6/16/2022  8:57 AM

## 2022-06-20 ENCOUNTER — OFFICE VISIT (OUTPATIENT)
Dept: ORTHOPEDIC SURGERY | Age: 72
End: 2022-06-20
Payer: MEDICARE

## 2022-06-20 DIAGNOSIS — M43.6 STIFFNESS OF CERVICAL SPINE: ICD-10-CM

## 2022-06-20 DIAGNOSIS — M54.2 NECK PAIN: Primary | ICD-10-CM

## 2022-06-20 PROCEDURE — 97110 THERAPEUTIC EXERCISES: CPT | Performed by: PHYSICAL THERAPIST

## 2022-06-20 PROCEDURE — 97140 MANUAL THERAPY 1/> REGIONS: CPT | Performed by: PHYSICAL THERAPIST

## 2022-06-20 NOTE — PROGRESS NOTES
PT DAILY TREATMENT NOTE    Patient Name: Carissa Jacques  Date:2022  : 1950  [x]  Patient  Verified  Payor: Christina Peaks / Plan: [place] MEDICARE PART A & B / Product Type: Medicare /    Total Treatment Time (min): 45  Total Timed Codes (min): 35  1:1 Treatment Time ( [address] only): 35  Referring Physician: Vanessa Torres DO     Treatment Area: Neck    SUBJECTIVE  Patient is doing well notes continued improvement. Still has a little bit of discomfort but has been keeping up with his stretches. OBJECTIVE  Modality:   []  E-Stim: type _ x _ min     []att   []unatt   []w/US   []w/ice   []w/heat  []  Ultrasound: []cont   []pulse    _ W/cm2 x _  min   []1MHz   []3MHz  [x]  Ice pack 10 min       []  Hot pack _  min       []  Other:     Therapeutic Exercise: (minutes: 30) One-on-one 15  [x] see exercise log      Added/Changed Exercises:  []  Added:   []  Changed:       Manual Therapy: (minutes: 20)  Cervical traction with suboccipital release, soft tissue mobilization to left-sided cervical paraspinals, upper trap. Cervical rotation stretching, left upper trap stretching      Patient Education: [x] Review HEP    [] Progressed/Changed HEP:      Other Objective/Functional Measures:     ASSESSMENT  []  See Plan of Care  []  See progress note/recertification  [x]  Patient will continue to benefit from skilled therapy to address remaining functional deficits:     Continued subjective improvement. He still a bit stiff with rotation but soft tissue mobility throughout the upper trapezius and paraspinals is improving. Follow again end of this week. ICD-10-CM ICD-9-CM    1. Neck pain  M54.2 723.1    2.  Stiffness of cervical spine  M43.6 723.5        PLAN  [x] Progress as tolerated under current plan towards long-term goals  [] Discharge  [] Other:        PT Exercise Log         Activity/Exercise Date  22       UBE x6' x     Chin tuck x      Supine mid trap x     Rows x     Lat pulldown x      T-spine chair extension x     Corner stretch x     Bilateral ER x                                                               Return in about 4 days (around 6/24/2022) for Continued skilled physical therapy.     Cosme Ward, PT 6/20/2022  8:57 AM

## 2022-06-24 ENCOUNTER — OFFICE VISIT (OUTPATIENT)
Dept: ORTHOPEDIC SURGERY | Age: 72
End: 2022-06-24
Payer: MEDICARE

## 2022-06-24 DIAGNOSIS — M54.2 NECK PAIN: Primary | ICD-10-CM

## 2022-06-24 DIAGNOSIS — M43.6 STIFFNESS OF CERVICAL SPINE: ICD-10-CM

## 2022-06-24 PROCEDURE — 97110 THERAPEUTIC EXERCISES: CPT | Performed by: PHYSICAL THERAPIST

## 2022-06-24 PROCEDURE — 97140 MANUAL THERAPY 1/> REGIONS: CPT | Performed by: PHYSICAL THERAPIST

## 2022-06-24 NOTE — PROGRESS NOTES
PT DAILY TREATMENT NOTE    Patient Name: Michaela Franks  Date:2022  : 1950  [x]  Patient  Verified  Payor: Sole Noel / Plan: [place] / Product Type: Medicare /    Total Treatment Time (min): 50  Total Timed Codes (min): 43  1:1 Treatment Time ( [address] only): 43  Referring Physician: Kayla Ludwig DO     Treatment Area: Neck    SUBJECTIVE  Ports feeling good. He has continued to be compliant with his stretches. OBJECTIVE  Modality:   []  E-Stim: type _ x _ min     []att   []unatt   []w/US   []w/ice   []w/heat  []  Ultrasound: []cont   []pulse    _ W/cm2 x _  min   []1MHz   []3MHz  [x]  Ice pack 10 min       []  Hot pack _  min       []  Other:     Therapeutic Exercise: (minutes: 30) One-on-one 20  [x] see exercise log      Added/Changed Exercises:  []  Added:   []  Changed:       Manual Therapy: (minutes: 23)  Cervical traction with suboccipital release, soft tissue mobilization to left-sided cervical paraspinals, upper trap. Cervical rotation stretching, left upper trap stretching      Patient Education: [x] Review HEP    [] Progressed/Changed HEP:      Other Objective/Functional Measures:     ASSESSMENT  []  See Plan of Care  []  See progress note/recertification  [x]  Patient will continue to benefit from skilled therapy to address remaining functional deficits:     Patient is doing very well. He really has minimal complaints now, feels looser. Soft tissue restrictions improved throughout the left upper trapezius and levator. We discussed reducing visit frequency to 1 time per week to see how he does with more independence. ICD-10-CM ICD-9-CM    1. Neck pain  M54.2 723.1    2.  Stiffness of cervical spine  M43.6 723.5        PLAN  [x] Progress as tolerated under current plan towards long-term goals  [] Discharge  [] Other:        PT Exercise Log         Activity/Exercise Date  22       UBE x6' x     Chin tuck x      Supine mid trap x     Rows x     Lat pulldown x      T-spine chair extension x     Corner stretch x     Bilateral ER x                                                               Return in about 3 days (around 6/27/2022) for Continued skilled physical therapy.     Susan Guan, PT 6/24/2022  8:57 AM

## 2022-06-27 ENCOUNTER — OFFICE VISIT (OUTPATIENT)
Dept: ORTHOPEDIC SURGERY | Age: 72
End: 2022-06-27
Payer: MEDICARE

## 2022-06-27 DIAGNOSIS — M43.6 STIFFNESS OF CERVICAL SPINE: ICD-10-CM

## 2022-06-27 DIAGNOSIS — M54.2 NECK PAIN: Primary | ICD-10-CM

## 2022-06-27 PROCEDURE — 97140 MANUAL THERAPY 1/> REGIONS: CPT | Performed by: PHYSICAL THERAPIST

## 2022-06-27 PROCEDURE — 97110 THERAPEUTIC EXERCISES: CPT | Performed by: PHYSICAL THERAPIST

## 2022-06-27 NOTE — PROGRESS NOTES
PT DAILY TREATMENT NOTE    Patient Name: Lady Negron  Date:2022  : 1950  [x]  Patient  Verified  Payor: Jasmyneabhilashedin Lindo / Plan: [place] MEDICARE PART A & B / Product Type: Medicare /    Total Treatment Time (min): 53  Total Timed Codes (min): 38  1:1 Treatment Time ( [address] only): 38  Referring Physician: Slick Pérez DO     Treatment Area: Neck    SUBJECTIVE  Neck is doing well, still gets stiff at times but does his stretches regularly. OBJECTIVE  Modality:   []  E-Stim: type _ x _ min     []att   []unatt   []w/US   []w/ice   []w/heat  []  Ultrasound: []cont   []pulse    _ W/cm2 x _  min   []1MHz   []3MHz  [x]  Ice pack 10 min       []  Hot pack _  min       []  Other:     Therapeutic Exercise: (minutes: 30) One-on-one 15  [x] see exercise log      Added/Changed Exercises:  []  Added:   []  Changed:       Manual Therapy: (minutes: 23)  Cervical traction with suboccipital release, soft tissue mobilization to left-sided cervical paraspinals, upper trap. Cervical rotation stretching, left upper trap stretching      Patient Education: [x] Review HEP    [x] Progressed/Changed HEP: Provided with a new handout for scapular strengthening progression and also gave him a red TB    Other Objective/Functional Measures:     ASSESSMENT  []  See Plan of Care  []  See progress note/recertification  [x]  Patient will continue to benefit from skilled therapy to address remaining functional deficits:     Still a bit tight with left rotation but overall doing well. We discussed reducing visit frequency to 1 time per week since he has had such a reduction in pain. Home exercise program updated. We will follow him again next week. ICD-10-CM ICD-9-CM    1. Neck pain  M54.2 723.1    2.  Stiffness of cervical spine  M43.6 723.5        PLAN  [x] Progress as tolerated under current plan towards long-term goals  [] Discharge  [] Other:        PT Exercise Log         Activity/Exercise Date  22       UBE x6' x Chin tuck x      Supine mid trap x     Rows x     Lat pulldown x      T-spine chair extension x     Corner stretch x     Bilateral ER x                                                               Return in about 8 days (around 7/5/2022) for Continued skilled physical therapy.     John Bolton, PT 6/27/2022  8:57 AM

## 2022-07-05 ENCOUNTER — OFFICE VISIT (OUTPATIENT)
Dept: ORTHOPEDIC SURGERY | Age: 72
End: 2022-07-05
Payer: MEDICARE

## 2022-07-05 DIAGNOSIS — M54.2 NECK PAIN: Primary | ICD-10-CM

## 2022-07-05 DIAGNOSIS — M43.6 STIFFNESS OF CERVICAL SPINE: ICD-10-CM

## 2022-07-05 PROCEDURE — 97140 MANUAL THERAPY 1/> REGIONS: CPT | Performed by: PHYSICAL THERAPIST

## 2022-07-05 PROCEDURE — 97110 THERAPEUTIC EXERCISES: CPT | Performed by: PHYSICAL THERAPIST

## 2022-07-05 NOTE — PROGRESS NOTES
PT DAILY TREATMENT NOTE    Patient Name: Mamta Mckinney  Date:2022  : 1950  [x]  Patient  Verified  Payor: Angelic Estrada / Plan: [place] MEDICARE PART A & B / Product Type: Medicare /    Total Treatment Time (min): 50  Total Timed Codes (min): 25  1:1 Treatment Time ( [address] only): 25  Referring Physician: Patrick Garza DO     Treatment Area: Neck    SUBJECTIVE  Exercises continue be helpful primary concern left-sided cervical    OBJECTIVE  Modality:   []  E-Stim: type _ x _ min     []att   []unatt   []w/US   []w/ice   []w/heat  []  Ultrasound: []cont   []pulse    _ W/cm2 x _  min   []1MHz   []3MHz  [x]  Ice pack 10 min       []  Hot pack _  min       []  Other:     Therapeutic Exercise: (minutes: 35) One-on-one 10  [x] see exercise log      Added/Changed Exercises:  []  Added:   []  Changed:       Manual Therapy: (minutes: 15)  Cervical traction with suboccipital release, soft tissue mobilization to left-sided cervical paraspinals, upper trap. Cervical rotation stretching, left upper trap stretching      Patient Education: [x] Review HEP    [x] Progressed/Changed HEP: Provided with a new handout for scapular strengthening progression and also gave him a red TB    Other Objective/Functional Measures:     ASSESSMENT  []  See Plan of Care  []  See progress note/recertification  [x]  Patient will continue to benefit from skilled therapy to address remaining functional deficits:     Continues with end range of motion restriction rotationally. Pain pattern continues to improve postural reeducation completed today      ICD-10-CM ICD-9-CM    1. Neck pain  M54.2 723.1    2.  Stiffness of cervical spine  M43.6 723.5        PLAN  [x] Progress as tolerated under current plan towards long-term goals  [] Discharge  [] Other:        PT Exercise Log         Activity/Exercise Date  22       UBE x6' x     Chin tuck x      Supine mid trap x     Rows x     Lat pulldown x      T-spine chair extension x     Corner stretch x     Bilateral ER x                                                               No follow-ups on file.     Ashley Esparza, PT 7/5/2022  8:57 AM

## 2022-07-11 ENCOUNTER — OFFICE VISIT (OUTPATIENT)
Dept: ORTHOPEDIC SURGERY | Age: 72
End: 2022-07-11
Payer: MEDICARE

## 2022-07-11 DIAGNOSIS — M54.2 NECK PAIN: Primary | ICD-10-CM

## 2022-07-11 DIAGNOSIS — M43.6 STIFFNESS OF CERVICAL SPINE: ICD-10-CM

## 2022-07-11 PROCEDURE — 97140 MANUAL THERAPY 1/> REGIONS: CPT | Performed by: PHYSICAL THERAPIST

## 2022-07-11 PROCEDURE — 97110 THERAPEUTIC EXERCISES: CPT | Performed by: PHYSICAL THERAPIST

## 2022-07-11 NOTE — PROGRESS NOTES
PT DAILY TREATMENT NOTE    Patient Name: Miroslava Snell  Date:2022  : 1950  [x]  Patient  Verified  Payor: Krys Kebede / Plan: [place] MEDICARE PART A & B / Product Type: Medicare /    Total Treatment Time (min): 50  Total Timed Codes (min): 25  1:1 Treatment Time ( [address] only): 25  Referring Physician: Janneth Manzano DO     Treatment Area: Neck    SUBJECTIVE  Patient is doing well. Neck still pops at times but has continued to keep up with his exercises which are helpful. OBJECTIVE  Modality:   []  E-Stim: type _ x _ min     []att   []unatt   []w/US   []w/ice   []w/heat  []  Ultrasound: []cont   []pulse    _ W/cm2 x _  min   []1MHz   []3MHz  [x]  Ice pack 10 min       []  Hot pack _  min       []  Other:     Therapeutic Exercise: (minutes: 35) One-on-one 10  [x] see exercise log      Added/Changed Exercises:  []  Added:   []  Changed:       Manual Therapy: (minutes: 15)  Cervical traction with suboccipital release, soft tissue mobilization to left-sided cervical paraspinals, upper trap. Cervical rotation stretching, left upper trap stretching      Patient Education: [x] Review HEP    [x] Progressed/Changed HEP: Provided with a new handout for scapular strengthening progression and also gave him a red TB    Other Objective/Functional Measures:     ASSESSMENT  []  See Plan of Care  []  See progress note/recertification  [x]  Patient will continue to benefit from skilled therapy to address remaining functional deficits:     Patient seems pleased with his progress. He has 1 more visit scheduled then we discussed discharging him home program at this time. ICD-10-CM ICD-9-CM    1. Neck pain  M54.2 723.1    2.  Stiffness of cervical spine  M43.6 723.5        PLAN  [x] Progress as tolerated under current plan towards long-term goals  [] Discharge  [] Other:        PT Exercise Log         Activity/Exercise Date  22       UBE x6' x     Chin tuck x      Supine mid trap x     Rows x     Lat pulldown x      T-spine chair extension x     Corner stretch x     Bilateral ER x                                                               Return in about 4 days (around 7/15/2022) for Continued skilled physical therapy.     Parag Gutierrez, PT 7/11/2022  8:57 AM

## 2022-07-15 ENCOUNTER — OFFICE VISIT (OUTPATIENT)
Dept: ORTHOPEDIC SURGERY | Age: 72
End: 2022-07-15
Payer: MEDICARE

## 2022-07-15 DIAGNOSIS — M43.6 STIFFNESS OF CERVICAL SPINE: ICD-10-CM

## 2022-07-15 DIAGNOSIS — M54.2 NECK PAIN: Primary | ICD-10-CM

## 2022-07-15 PROCEDURE — 97110 THERAPEUTIC EXERCISES: CPT | Performed by: PHYSICAL THERAPIST

## 2022-07-15 PROCEDURE — 97140 MANUAL THERAPY 1/> REGIONS: CPT | Performed by: PHYSICAL THERAPIST

## 2022-07-15 NOTE — PROGRESS NOTES
PT DAILY TREATMENT NOTE    Patient Name: Navneet Baez  Date:7/15/2022  : 1950  [x]  Patient  Verified  Payor: Sonam Millard / Plan: [place] MEDICARE PART A & B / Product Type: Medicare /    Total Treatment Time (min): 50  Total Timed Codes (min): 40  1:1 Treatment Time ( [address] only): 40  Referring Physician: Cesar Butler DO     Treatment Area: Neck    SUBJECTIVE  Patient reports neck is still feeling good. He is going to have to do a lot of lifting on Monday and therefore would like to schedule one more visit to see how he does with this. OBJECTIVE  Modality:   []  E-Stim: type _ x _ min     []att   []unatt   []w/US   []w/ice   []w/heat  []  Ultrasound: []cont   []pulse    _ W/cm2 x _  min   []1MHz   []3MHz  [x]  Ice pack 10 min       []  Hot pack _  min       []  Other:     Therapeutic Exercise: (minutes: 35) One-on-one 25  [x] see exercise log      Added/Changed Exercises:  []  Added:   []  Changed:       Manual Therapy: (minutes: 15)  Cervical traction with suboccipital release, soft tissue mobilization to left-sided cervical paraspinals, upper trap. Cervical rotation stretching, left upper trap stretching      Patient Education: [] Review HEP    [] Progressed/Changed HEP:     Other Objective/Functional Measures:     ASSESSMENT  []  See Plan of Care  []  See progress note/recertification  [x]  Patient will continue to benefit from skilled therapy to address remaining functional deficits:     Patient still doing very well. Does have a mild tightness in the left side of his neck at times but reports it really is not bad. He is going to have to do a lot of heavier lifting next week so would like to schedule one more visit to see how he does with this. We will follow him again then. ICD-10-CM ICD-9-CM    1. Neck pain  M54.2 723.1    2.  Stiffness of cervical spine  M43.6 723.5        PLAN  [x] Progress as tolerated under current plan towards long-term goals  [] Discharge  [] Other:        PT Exercise Log         Activity/Exercise Date  07/15/22       UBE x6' x     Chin tuck x      Supine mid trap x     Rows x     Lat pulldown x      T-spine chair extension x     Corner stretch x     Bilateral ER x                                                               Return for Continued skilled physical therapy.     Estuardo Paige, PT 7/15/2022  8:57 AM

## 2022-07-28 ENCOUNTER — OFFICE VISIT (OUTPATIENT)
Dept: ORTHOPEDIC SURGERY | Age: 72
End: 2022-07-28
Payer: MEDICARE

## 2022-07-28 DIAGNOSIS — M54.2 NECK PAIN: Primary | ICD-10-CM

## 2022-07-28 DIAGNOSIS — M43.6 STIFFNESS OF CERVICAL SPINE: ICD-10-CM

## 2022-07-28 PROCEDURE — 97110 THERAPEUTIC EXERCISES: CPT | Performed by: PHYSICAL THERAPIST

## 2022-07-28 PROCEDURE — 97140 MANUAL THERAPY 1/> REGIONS: CPT | Performed by: PHYSICAL THERAPIST

## 2022-07-28 NOTE — PROGRESS NOTES
PT DAILY TREATMENT NOTE    Patient Name: Garfield Epley  Date:2022  : 1950  [x]  Patient  Verified  Payor: Bernarda Ni / Plan: [place] MEDICARE PART A & B / Product Type: Medicare /    Total Treatment Time (min): 50  Total Timed Codes (min): 30  1:1 Treatment Time ( [address] only): 30  Referring Physician: Nisha Bob DO     Treatment Area: Neck    SUBJECTIVE  Patient reports doing well, no issues with his heavy chores this week that required heavier lifting than he is used to. He feels ready for today to be his last visit. OBJECTIVE  Modality:   []  E-Stim: type _ x _ min     []att   []unatt   []w/US   []w/ice   []w/heat  []  Ultrasound: []cont   []pulse    _ W/cm2 x _  min   []1MHz   []3MHz  [x]  Ice pack 10 min       []  Hot pack _  min       []  Other:     Therapeutic Exercise: (minutes: 35) One-on-one 10  [x] see exercise log      Added/Changed Exercises:  []  Added:   []  Changed:       Manual Therapy: (minutes: 20)  Cervical traction with suboccipital release, soft tissue mobilization to left-sided cervical paraspinals, upper trap. Cervical rotation stretching, left upper trap stretching      Patient Education: [] Review HEP    [] Progressed/Changed HEP:     Other Objective/Functional Measures:     ASSESSMENT  [x]  See Plan of Care  []  See progress note/recertification  []  Patient will continue to benefit from skilled therapy to address remaining functional deficits:     Patient has been participating PT for about 6 weeks now for left-sided neck pain. Overall he has done very well. He has had near resolution of symptoms and has kept up with his home exercises. He did well with heavy lifting activities earlier this week and feels ready for today to be his last visit. Discharge from PT. ICD-10-CM ICD-9-CM    1. Neck pain  M54.2 723.1       2.  Stiffness of cervical spine  M43.6 723.5           PLAN  [] Progress as tolerated under current plan towards long-term goals  [x] Discharge  [] Other:        PT Exercise Log         Activity/Exercise Date  07/28/22       UBE x6' x     Chin tuck x      Supine mid trap x     Rows x     Lat pulldown x      T-spine chair extension x     Corner stretch x     Bilateral ER x                                                               No follow-ups on file.     Cinthia Underwood, PT 7/28/2022  8:57 AM

## 2022-08-10 ENCOUNTER — OFFICE VISIT (OUTPATIENT)
Dept: NEUROLOGY | Age: 72
End: 2022-08-10
Payer: MEDICARE

## 2022-08-10 DIAGNOSIS — F31.31 BIPOLAR AFFECTIVE DISORDER, CURRENTLY DEPRESSED, MILD (HCC): ICD-10-CM

## 2022-08-10 DIAGNOSIS — F41.1 GENERALIZED ANXIETY DISORDER: ICD-10-CM

## 2022-08-10 DIAGNOSIS — G21.11 NEUROLEPTIC INDUCED PARKINSONISM (HCC): ICD-10-CM

## 2022-08-10 DIAGNOSIS — G31.84 MILD COGNITIVE IMPAIRMENT: Primary | ICD-10-CM

## 2022-08-10 DIAGNOSIS — R41.3 SHORT-TERM MEMORY LOSS: ICD-10-CM

## 2022-08-10 DIAGNOSIS — F22 PARANOIA (HCC): ICD-10-CM

## 2022-08-10 DIAGNOSIS — R41.89 COGNITIVE DECLINE: ICD-10-CM

## 2022-08-10 PROCEDURE — 1123F ACP DISCUSS/DSCN MKR DOCD: CPT | Performed by: CLINICAL NEUROPSYCHOLOGIST

## 2022-08-10 PROCEDURE — 90791 PSYCH DIAGNOSTIC EVALUATION: CPT | Performed by: CLINICAL NEUROPSYCHOLOGIST

## 2022-08-10 NOTE — PROGRESS NOTES
[address] [facility]  [address]   [place] [address] [facility]   [phone] Office   [phone] Fax      Neuropsychology    Initial Diagnostic Interview Note      Referral:  Inna Cullen MD, . Raphael Germaine is a 67 y.o. dominant left handed   male who was accompanied by his spouse  to the initial clinical interview on 8/10/22. Please refer to his medical records for details pertaining to his history. At the start of the appointment, I reviewed the patient's [facility] Epic Chart (including Media scanned in from previous providers) for the active Problem List, all pertinent Past Medical Hx, medications, recent radiologic and laboratory findings. In addition, I reviewed pt's documented Immunization Record and Encounter History. 's [address]my [address] from [facility] without history of previously diagnosed LD and/or receipt of special education services. 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 He has noticed some changes in his memory, going on at least five years, but he can remember things from high school but short term memory is also an issue. He has seen Neurology and has been dealing with tremor issues with L>R. They have been  46 years now. He had left hip surgery.  x 51 years. She has noticed progressive decline in the last four years. Loses words. Drools on occasion. Has lived in [address] [facility] different places. He is on seroquel and lithium for bipolar. He has more of difficulties using computer, phone, things like that. Hard time just being able to use it. He is not much into IT but spends a long time on his computer, checking my chart, schedules, things like that. Stays involved with [employer]. [employer]. He has stopped volunteering.   Getting worse over the past couple of years. He has gotten more fearful about things, like driving. No accidents or  near misses. He gets worried about people walking out into the car, dogs coming out. Getting more anxious per spouse, he has said he is always hypervigilant. Labs normal with respect to toxicity. Bipolar chronic. Memory worse when depressed. He remains independent for medication, finances, day-today chores, etc. He sometimes mixes up his meds. Lithium went down from 450 to 300. Tremor better now that lithium level has come down. No known stroke, meningitis/encephalitis, NATALIIA Fever, Lupus, Lyme, TBI, sz.  Enjoys oystering. ST has a hard time coming up with words/names. There is a family history of dementia (aunt on mother's side, grandfather - dementia) Bipolar mom. No previous neuropsych. Neuropsychological Mental Status Exam (NMSE):      Historian: Good  Praxis: No UE apraxia  R/L Orientation: Intact to self and to other  Dress: within normal limits   Weight: within normal limits   Appearance/Hygiene: within normal limits   Gait: within normal limits   Assistive Devices: Glasses  Mood: within normal limits   Affect: within normal limits   Comprehension: within normal limits   Thought Process: within normal limits   Expressive Language: within normal limits   Receptive Language: within normal limits   Motor:  No cognitive or motor perseveration  ETOH: 1-2 drinks every so often  Tobacco: Denied  Illicit: Denied  SI/HI: Has passive thoughts. Father committed suicide, he thinks about it every day. Denied HI. Psychosis: Denied  Insight: Within normal limits  Judgment: Within normal limits  Other Psych: may have a touch of paranoia at times?       Past Medical History:   Diagnosis Date    Arthritis     Bipolar affective disorder ([facility] [address]     Deviated nasal septum 12/14/2019    ED (erectile dysfunction)     Essential hypertension 5/3/2021    GERD (gastroesophageal reflux disease)     Gout     HTN (hypertension) Hyperlipidemia 8/5/2011    Hypothyroidism     Inguinal hernia 11/27/2012    Rupture Achilles tendon     left; resolved. Sleep apnea 8/31/2011    does not use CPAP/uses dental appliance       Past Surgical History:   Procedure Laterality Date    COLONOSCOPY N/A 5/22/2018    COLONOSCOPY performed by Gavino Aguila MD at [facility] ENDOSCOPY    COLONOSCOPY N/A 10/19/2021    COLONOSCOPY (bringing) performed by Wiley Brice MD at [facility] ENDOSCOPY    HX COLONOSCOPY  10/13/14    polypectomy, f/u 3 y . dr. Levin Reusing Right     tendon repair of thumb    HX ORTHOPAEDIC      left hip    HX OTHER SURGICAL  2015    right inguinal hernia repair    HX OTHER SURGICAL      colonoscopy - hemorrhoids    HX TONSILLECTOMY  1960's       No Known Allergies    Family History   Problem Relation Age of Onset    Other Father         Snoring, insomnia    Suicide Father     Heart Attack Mother 79    Bipolar Disorder Mother     No Known Problems Sister     Bipolar Disorder Maternal Aunt     Anesth Problems Neg Hx        Social History     Tobacco Use    Smoking status: Former    Smokeless tobacco: Never    Tobacco comments:     quit smoking cigarettes/pipe 40 yrs ago   Vaping Use    Vaping Use: Never used   Substance Use Topics    Alcohol use: Yes     Comment:  2-3   drinks per week     Drug use: Never       Current Outpatient Medications   Medication Sig Dispense Refill    levothyroxine (SYNTHROID) 150 mcg tablet TAKE 1 TABLET DAILY 90 Tablet 3    lithium carbonate 300 mg tablet       atorvastatin (LIPITOR) 10 mg tablet Take 1 Tablet by mouth daily. 90 Tablet 3    cyanocobalamin (Vitamin B-12) 100 mcg tablet Take 100 mcg by mouth daily.       ergocalciferol (Vitamin D2) 1,250 mcg (50,000 unit) capsule Take 50,000 Units by mouth.      sildenafiL, pulmonary hypertension, (REVATIO) 20 mg tablet TAKE 1-3 TABLETS BY MOUTH 1 HOUR BEFORE RELATIONS. ** USE INSTEAD OF CIALIS ** 30 Tablet 11    amLODIPine (Norvasc) 5 mg tablet Take 1 Tablet by mouth daily. 90 Tablet 3    naloxone (NARCAN) 4 mg/actuation nasal spray Use 1 spray intranasally, then discard. Repeat with new spray every 2 min as needed for opioid overdose symptoms, alternating nostrils. 1 Each 0    carboxymethylcellulose sodium (Refresh Tears) 0.5 % drop ophthalmic solution Administer 1 Drop to both eyes two (2) times a day. diclofenac (Voltaren Arthritis Pain) 1 % gel Apply  to affected area four (4) times daily. Apply 4 inches 4 times a day to affected area prn 350 g 3    mometasone (NASONEX) 50 mcg/actuation nasal spray 1 [place] by Both Nostrils route nightly. hydrocortisone (HYTONE) 2.5 % topical cream Apply 1 g to affected area two (2) times daily as needed for PRN Reason (Other) (rash on face). use thin layer       lamotrigine (LAMICTAL) 150 mg tablet Take 150 mg by mouth two (2) times a day. quetiapine (SEROQUEL) 100 mg tablet Take 200 mg by mouth nightly. Plan:  Obtain authorization for testing from insurance company. Report to follow once testing, scoring, and interpretation completed. ? Organic based neurocognitive issues versus mood disorder or combination of same. ? Problems organic, functional, or both? This note will not be viewable in [address]

## 2022-09-09 ENCOUNTER — OFFICE VISIT (OUTPATIENT)
Dept: NEUROLOGY | Age: 72
End: 2022-09-09
Payer: MEDICARE

## 2022-09-09 DIAGNOSIS — G31.84 MILD COGNITIVE IMPAIRMENT: Primary | ICD-10-CM

## 2022-09-09 DIAGNOSIS — R41.3 FUNCTIONAL MEMORY PROBLEM: ICD-10-CM

## 2022-09-09 DIAGNOSIS — F31.31 BIPOLAR AFFECTIVE DISORDER, CURRENTLY DEPRESSED, MILD (HCC): ICD-10-CM

## 2022-09-09 DIAGNOSIS — F41.1 GENERALIZED ANXIETY DISORDER: ICD-10-CM

## 2022-09-09 DIAGNOSIS — F22 PARANOIA (HCC): ICD-10-CM

## 2022-09-09 PROCEDURE — 96138 PSYCL/NRPSYC TECH 1ST: CPT | Performed by: CLINICAL NEUROPSYCHOLOGIST

## 2022-09-09 PROCEDURE — 96132 NRPSYC TST EVAL PHYS/QHP 1ST: CPT | Performed by: CLINICAL NEUROPSYCHOLOGIST

## 2022-09-09 PROCEDURE — 96136 PSYCL/NRPSYC TST PHY/QHP 1ST: CPT | Performed by: CLINICAL NEUROPSYCHOLOGIST

## 2022-09-09 PROCEDURE — 96133 NRPSYC TST EVAL PHYS/QHP EA: CPT | Performed by: CLINICAL NEUROPSYCHOLOGIST

## 2022-09-09 PROCEDURE — 96139 PSYCL/NRPSYC TST TECH EA: CPT | Performed by: CLINICAL NEUROPSYCHOLOGIST

## 2022-09-09 PROCEDURE — 96137 PSYCL/NRPSYC TST PHY/QHP EA: CPT | Performed by: CLINICAL NEUROPSYCHOLOGIST

## 2022-09-09 NOTE — LETTER
9/15/2022    Patient: Khang Dick   YOB: 1950   Date of Visit: 9/9/2022     [address] [address]  [address] [place] [address]    Dear MD Jean-Pierre Leon, DO,      Thank you for referring Mr. Cleo Boles to [facility] for evaluation. My notes for this consultation are attached. If you have questions, please do not hesitate to call me. I look forward to following your patient along with you.       Sincerely,    Felipe Martinez PsyD

## 2022-09-15 NOTE — PROGRESS NOTES
[address] [facility],5Th [address]  [address]   [address] [place] [address] [facility]   [phone] Office   [phone] Fax      Neuropsychological Evaluation Report    Referral:  Racheal Betts MD, . Golden Salazar is a 67 y.o. dominant left handed   male who was accompanied by his spouse  to the initial clinical interview on 8/10/22. Please refer to his medical records for details pertaining to his history. At the start of the appointment, I reviewed the patient's [facility] Epic Chart (including Media scanned in from previous providers) for the active Problem List, all pertinent Past Medical Hx, medications, recent radiologic and laboratory findings. In addition, I reviewed pt's documented Immunization Record and Encounter History. 's in [address] from [facility] without history of previously diagnosed LD and/or receipt of special education services. 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 He has noticed some changes in his memory, going on at least five years, but he can remember things from high school but short term memory is also an issue. He has seen Neurology and has been dealing with tremor issues with L>R. They have been  46 years now. He had left hip surgery.  x 51 years. She has noticed progressive decline in the last four years. Loses words. Drools on occasion. Has lived in 21 different places. He is on seroquel and lithium for bipolar. He has more of difficulties using computer, phone, things like that. Hard time just being able to use it. He is not much into IT but spends a long time on his computer, checking my chart, schedules, things like that. Stays involved with [employer]. Master [employer] information. He has stopped volunteering. Getting worse over the past couple of years.  He has gotten more fearful about things, like driving. No accidents or  near misses. He gets worried about people walking out into the car, dogs coming out. Getting more anxious per spouse, he has said he is always hypervigilant. Labs normal with respect to toxicity. Bipolar chronic. Memory worse when depressed. He remains independent for medication, finances, day-today chores, etc. He sometimes mixes up his meds. Lithium went down from 450 to 300. Tremor better now that lithium level has come down. No known stroke, meningitis/encephalitis, NATALIIA Fever, Lupus, Lyme, TBI, sz.  Enjoys oystering. ST has a hard time coming up with words/names. There is a family history of dementia (aunt on mother's side, grandfather - dementia) Bipolar mom. ? OCD. ? Paranoia. ? Anxiety    No previous neuropsych. Neuropsychological Mental Status Exam (NMSE):      Historian: Good  Praxis: No UE apraxia  R/L Orientation: Intact to self and to other  Dress: within normal limits   Weight: within normal limits   Appearance/Hygiene: within normal limits   Gait: within normal limits   Assistive Devices: Glasses  Mood: within normal limits   Affect: within normal limits   Comprehension: within normal limits   Thought Process: within normal limits   Expressive Language: within normal limits   Receptive Language: within normal limits   Motor:  No cognitive or motor perseveration  ETOH: 1-2 drinks every so often  Tobacco: Denied  Illicit: Denied  SI/HI: Has passive thoughts. Father committed suicide, he thinks about it every day. Denied HI. Psychosis: Denied  Insight: Within normal limits  Judgment: Within normal limits  Other Psych: may have a touch of paranoia at times?       Past Medical History:   Diagnosis Date    Arthritis     Bipolar affective disorder ([facility] [address]     Deviated nasal septum 12/14/2019    ED (erectile dysfunction)     Essential hypertension 5/3/2021    GERD (gastroesophageal reflux disease)     Gout     HTN (hypertension) Hyperlipidemia 8/5/2011    Hypothyroidism     Inguinal hernia 11/27/2012    Rupture Achilles tendon     left; resolved. Sleep apnea 8/31/2011    does not use CPAP/uses dental appliance       Past Surgical History:   Procedure Laterality Date    COLONOSCOPY N/A 5/22/2018    COLONOSCOPY performed by Milla Reeder MD at [facility] ENDOSCOPY    COLONOSCOPY N/A 10/19/2021    COLONOSCOPY (bringing) performed by Yoshi Ojeda MD at [facility] ENDOSCOPY    HX COLONOSCOPY  10/13/14    polypectomy, f/u 3 y . dr. Barbara Doty Right     tendon repair of thumb    HX ORTHOPAEDIC      left hip    HX OTHER SURGICAL  2015    right inguinal hernia repair    HX OTHER SURGICAL      colonoscopy - hemorrhoids    HX TONSILLECTOMY  1960's       No Known Allergies    Family History   Problem Relation Age of Onset    Other Father         Snoring, insomnia    Suicide Father     Heart Attack Mother 79    Bipolar Disorder Mother     No Known Problems Sister     Bipolar Disorder Maternal Aunt     Anesth Problems Neg Hx        Social History     Tobacco Use    Smoking status: Former    Smokeless tobacco: Never    Tobacco comments:     quit smoking cigarettes/pipe 40 yrs ago   Vaping Use    Vaping Use: Never used   Substance Use Topics    Alcohol use: Yes     Comment:  2-3   drinks per week     Drug use: Never       Current Outpatient Medications   Medication Sig Dispense Refill    levothyroxine (SYNTHROID) 150 mcg tablet TAKE 1 TABLET DAILY 90 Tablet 3    lithium carbonate 300 mg tablet       atorvastatin (LIPITOR) 10 mg tablet Take 1 Tablet by mouth daily. 90 Tablet 3    cyanocobalamin (Vitamin B-12) 100 mcg tablet Take 100 mcg by mouth daily.       ergocalciferol (Vitamin D2) 1,250 mcg (50,000 unit) capsule Take 50,000 Units by mouth.      sildenafiL, pulmonary hypertension, (REVATIO) 20 mg tablet TAKE 1-3 TABLETS BY MOUTH 1 HOUR BEFORE RELATIONS. ** USE INSTEAD OF CIALIS ** 30 Tablet 11    amLODIPine (Norvasc) 5 mg tablet Take 1 Tablet by mouth daily. 90 Tablet 3    naloxone (NARCAN) 4 mg/actuation nasal spray Use 1 spray intranasally, then discard. Repeat with new spray every 2 min as needed for opioid overdose symptoms, alternating nostrils. 1 Each 0    carboxymethylcellulose sodium (Refresh Tears) 0.5 % drop ophthalmic solution Administer 1 Drop to both eyes two (2) times a day. diclofenac (Voltaren Arthritis Pain) 1 % gel Apply  to affected area four (4) times daily. Apply 4 inches 4 times a day to affected area prn 350 g 3    mometasone (NASONEX) 50 mcg/actuation nasal spray 1 [place] by Both Nostrils route nightly. hydrocortisone (HYTONE) 2.5 % topical cream Apply 1 g to affected area two (2) times daily as needed for PRN Reason (Other) (rash on face). use thin layer       lamotrigine (LAMICTAL) 150 mg tablet Take 150 mg by mouth two (2) times a day. quetiapine (SEROQUEL) 100 mg tablet Take 200 mg by mouth nightly. Plan:  Obtain authorization for testing from insurance company. Report to follow once testing, scoring, and interpretation completed. ? Organic based neurocognitive issues versus mood disorder or combination of same. ? Problems organic, functional, or both? This note will not be viewable in [address] Neuropsychological Test Results  Patient Testing 9/9/22 Report Completed 9/15/22  A Psychometrist Assisted w/ portions of this evaluation while under my direct  supervision    The following evaluation procedures/tests were administered:      Neuropsychologist Administered/Interpreted:  Neuropsychological Mental Status Exam, Revised Memory & Behavior Checklist,  Mini Mental Status Exam, Clock Drawing Test, Test Of Premorbid Functioning, Karishma-Melzack Pain Questionnaire, History Taking  & Clinical Interview With The Patient, Additional History Taking w/ The Patient's, ABAS-3, CASE, JENNIFER, CPT-III, Review Of Available Records.     Psychometrist Administered under Neuropsychologist Supervision & Neuropsychologist Interpreted:  Verbal Fluency Tests, Christopher & Christopher - Revised, Trailmaking Test Parts A & B, Wechsler Adult Intelligence Scale - IV, [place] [facility] [employer] - 3, Grooved Pegboard, Carlos Depression Inventory - II, Carlos Anxiety Inventory. Test Findings:  Test Findings:  Note:  The patients raw data have been compared with currently available norms which include demographic corrections for age, gender, and/or education. Sometimes, the patients scores are compared to demographically similar individuals as close to the patients age, education level, etc., as possible. \"Average\" is viewed as being +/- 1 standard deviation (SD) from the stated mean for a particular test score. \"Low average\" is viewed as being between 1 and 2 SD below the mean, and above average is viewed as being 1 and 2 SD above the mean. Scores falling in the borderline range (between 1-1/2 and 2 SD below the mean) are viewed with particular attention as to whether they are normal or abnormal neurocognitive test scores. Other methods of inference in analyzing the test data are also utilized, including the pattern and range of scores in the profile, bilateral motor functions, and the presence, if any, of pathognomonic signs. Behaviorally, the patient was friendly and cooperative and appeared motivated to perform well during this examination. Within this context, the results of this evaluation are viewed as a valid reflection of the patients actual neurocognitive and emotional status. His MMSE score of 28/30 correct was normal.  In this regard, recall for 3 words after briefly was 2/3 correct. Repetition was impaired. Clock drawing was normal.      His structured word list fluency, as assessed by the FAS Test, was within the below average range with a T score of 40. Category fluency was within the mildly to moderately impaired range with a T score of 33.   Confrontation naming, as assessed by the [facility] Test-revised, was within the mildly to moderately impaired range with a T score of 33. This pattern of performance is indicative of a patient who is at increased risk for day-to-day problems with verbal fluency and confrontation naming. The patient was administered the [facility] Continuous Performance Test - III,a computer administered test of sustained attention, and review of the subscales within this instrument did not reveal clinically significant concerns for inattentiveness or impulsivity. This pattern of performance is not indicative of a patient who is at increased risk for day-to-day problems with sustained visual attention/concentration. The patient is not showing problems with working memory capacity (23rd %ile) or processing speed (34th %ile) on the WAIS-IV. His Verbal Comprehension Index score of 89 was low average. His Perceptual Reasoning Index score of 84 was low average. These scores do not reflect a decline in functioning based on an assessment of premorbid functioning. The patient was administered the [facility] Verbal Learning Test  - 3 and generated a normal range and positive learning curve over 5 repeated auditory word list learning trials. An interference trial was impaired. Recall for the original word list was well within the normal range after both short and long delays. Recognition recall was Cowdrey Cano. Forced choice recall was normal, suggesting good effort. Taken together, this pattern of performance is not indicative of a patient who is at increased risk for day-to-day problems with auditory learning and/or memory. Simple timed visual motor sequencing (Trailmaking Test Part A) was within the average range with a T score of 47. His performance on a similar, but more complex task of timed visual motor sequencing (Trailmaking Test Part B) was within the mildly impaired range with a T score of 36.   He made only 1 sequencing error on this latter completed test.  Taken together, this pattern of performance is not indicative of a patient who is at increased risk for day-to-day problems with executive functioning. Fine motor dexterity was moderately to severely impaired for his dominant, left hand (T = 21) and mildly to moderately impaired for his nondominant hand (T = 34). Neurologic correlation is indicated with respect to possible lateralization. Fine motor dexterity was within the normal range bilaterally. This pattern of performance is not  indicative of a patient who is at increased risk for day-to-day problems with bilateral motor dexterity. The patient rated his current level of pain as \"1/5 - Mild\" on the Karishma-Melzack Pain Questionnaire. He reported pain in his right shoulder and left knee. His Carlos Depression Inventory- II score of 10 was within the normal range. His Carlos Anxiety Inventory score of 4 reflected minimal anxiety. The patient was administered the Personality Assessment Inventory and review of the validity scales reveals defensiveness in responding. He has a tendency to present himself consistently in a favorable light and is having relatively few common shortcomings or problems which many individuals will admit. Although these tendencies do not indicate a level of defensiveness that renders the test results invalid, the interpretive hypotheses must be reviewed with caution. Others may see him as being withdrawn, aloof, and somewhat unconventional.  Self-esteem will fluctuate as a function of his current circumstances. His self-reported level of treatment motivation is very low. Impressions & Recommendations: This patient generated a predominantly normal Neuropsychological Evaluation with respect to neurocognitive functioning.   In this regard, he is showing issues with dominant handed fine motor dexterity (for which neurologic correlation is indicated), nondominant handed fine motor dexterity to a lesser degree, verbal fluency, and confrontation naming. At the same time, his performances across all other neurocognitive domains assessed, including mental status, auditory learning, auditory memory, sustained attention, verbal comprehension, perceptual reasoning, working memory, processing speed, and executive functioning (though slow) remain normal.  From an emotional standpoint, the patient was quite defensive in his response style on personality testing. He denies clinically significant psychopathology within this context. There are concerns about paranoia, OCD, and generalized anxiety, though and carries a chronic bipolar disorder dx. Memory appears to functionally be worse when he is more depressed. The patient is followed by psychiatry and I defer to Dr. Mami Lewis for diagnostic considerations within this context. Thankfully, this profile is not consistent with a dementia type process. Instead, this is a mild cognitive impairment exacerbated by chronic underlying mental health concerns. In addition to continued medical and psychiatric care, my recommendations include consult with speech and occupational therapy to assist with fluency, naming, and fine motor dexterity issues. Neurologic correlation is indicated with respect to possible lateralization here. Otherwise, it is quite reassuring to note that his day-to-day memory issues appear functional in etiology. At present, I am not concerned about competency/capacity, driving, day-to-day supervision, etc.  The patient should be encouraged to remain as mentally, physically, and socially active as possible. We now have extensive baseline neurocognitive and psychologic data on him. Follow-up yearly, or as needed. Clinical correlation is, of course, indicated. I will discuss these findings with the patient and family when they follow up with me in the near future. A follow up Neuropsychological Evaluation is indicated on a prn basis.       DIAGNOSES: MCI Without Memory Loss (Verbal Fluency, Naming, Bilateral Fine Motor Dexterity (left hand worse than right)    Chronic Bipolar Disorder    Generalized Anxiety    ? Paranoia    ? OCD    Functional Memory Problem (2/2 the above)     The above information is based upon information currently available to me. If there is any additional information of which I am currently unaware, I would be more than happy to review it upon having it made available to me. Thank you for the opportunity to see this interesting individual.     Sincerely,       Genoveva Jain. Joy Bello Phelan, EdS      Attachments:  IQ Test Results (In Media Section Of This EMR)    Cc: Jona Myers MD    Time Documentation:    83139*5 81025*7 (60 minutes)    85471 x 1  96139 x 5 Test Administration/Data Gathering By Technician: (3 hours). 17015 x 1 (first 30 minutes), 40558 x 5 (each additional 30 minutes)    96132 x 1  96133 x 1 Testing Evaluation Services by Neuropsychologist (1 hour, 50 minutes) 96132 x 1 (first hour), 96133 x 1 (50 minutes)    Definitions:      [id number]/[id number]:  Neurobehavioral Status Exam, Clinical interview. Clinical assessment of thinking, reasoning and judgment, by neuropsychologist, both face to face time with patient and time interpreting those test results and reporting, first and subsequent hours)    81486/27562: Neuropsychological Test Administration by Technician/Psychometrist, first 30 minutes and each additional 30 minutes. The above includes: Record review. Review of history provided by patient. Review of collaborative information. Testing by Clinician. Review of raw data. Scoring. Report writing of individual tests administered by Clinician. Integration of individual tests administered by psychometrist with NSE/testing by clinician, review of records/history/collaborative information, case Conceptualization, treatment planning, clinical decision making, report writing, coordination Of Care.  Psychometry test codes as time spent by psychometrist administering and scoring neurocognitive/psychological tests under supervision of neuropsychologist.  Integral services including scoring of raw data, data interpretation, case conceptualization, report writing etcetera were initiated after the patient finished testing/raw data collected and was completed on the date the report was signed. Please note that this dictation was completed with [employer], the [employer] voice recognition software. Quite often unanticipated grammatical, syntax, homophones, and other interpretive errors are inadvertently transcribed by the computer software. Please disregard these errors. Please excuse any errors that have escaped final proofreading. Thank you.

## 2022-09-19 ENCOUNTER — OFFICE VISIT (OUTPATIENT)
Dept: NEUROLOGY | Age: 72
End: 2022-09-19
Payer: MEDICARE

## 2022-09-19 VITALS
BODY MASS INDEX: 24.74 KG/M2 | OXYGEN SATURATION: 97 % | WEIGHT: 199 LBS | HEART RATE: 56 BPM | DIASTOLIC BLOOD PRESSURE: 68 MMHG | HEIGHT: 75 IN | SYSTOLIC BLOOD PRESSURE: 138 MMHG

## 2022-09-19 DIAGNOSIS — R41.3 FUNCTIONAL MEMORY PROBLEM: ICD-10-CM

## 2022-09-19 DIAGNOSIS — G21.11 NEUROLEPTIC INDUCED PARKINSONISM (HCC): Primary | ICD-10-CM

## 2022-09-19 DIAGNOSIS — G31.84 MILD COGNITIVE IMPAIRMENT: ICD-10-CM

## 2022-09-19 DIAGNOSIS — F31.31 BIPOLAR AFFECTIVE DISORDER, CURRENTLY DEPRESSED, MILD (HCC): ICD-10-CM

## 2022-09-19 PROCEDURE — 99214 OFFICE O/P EST MOD 30 MIN: CPT | Performed by: PSYCHIATRY & NEUROLOGY

## 2022-09-19 PROCEDURE — G8420 CALC BMI NORM PARAMETERS: HCPCS | Performed by: PSYCHIATRY & NEUROLOGY

## 2022-09-19 PROCEDURE — 3017F COLORECTAL CA SCREEN DOC REV: CPT | Performed by: PSYCHIATRY & NEUROLOGY

## 2022-09-19 PROCEDURE — G8754 DIAS BP LESS 90: HCPCS | Performed by: PSYCHIATRY & NEUROLOGY

## 2022-09-19 PROCEDURE — G8536 NO DOC ELDER MAL SCRN: HCPCS | Performed by: PSYCHIATRY & NEUROLOGY

## 2022-09-19 PROCEDURE — 1123F ACP DISCUSS/DSCN MKR DOCD: CPT | Performed by: PSYCHIATRY & NEUROLOGY

## 2022-09-19 PROCEDURE — G8752 SYS BP LESS 140: HCPCS | Performed by: PSYCHIATRY & NEUROLOGY

## 2022-09-19 PROCEDURE — G8427 DOCREV CUR MEDS BY ELIG CLIN: HCPCS | Performed by: PSYCHIATRY & NEUROLOGY

## 2022-09-19 PROCEDURE — G9717 DOC PT DX DEP/BP F/U NT REQ: HCPCS | Performed by: PSYCHIATRY & NEUROLOGY

## 2022-09-19 PROCEDURE — 1101F PT FALLS ASSESS-DOCD LE1/YR: CPT | Performed by: PSYCHIATRY & NEUROLOGY

## 2022-09-19 NOTE — PROGRESS NOTES
Neurology Clinic Follow up Note    Patient ID:  Ramiro Goodpasture  [id number]  67 y.o.  1950      Mr. Arturo Bates is here for follow up today of memory impairment. Last Appointment With Me:  3/18/2022      Interval History:   He returns for follow up today due to tremors and concerns regarding memory impairment. He notes hand tremors L>R, more notable with activity. These have improved since initial evaluation following reduction in lithium. Denies bradykinesia, freezing episodes. He is still having some unsteady gait/imbalance since his hip replacement 5/2021. No reported falls. In regards to his short term memory, he feels this is stable from last visit. He still repeats himself on occasion per his wife. Neuropsychologic testing did not reveal an evolving dementia process. PMHx/ PSHx/ FHx/ SHx:  Reviewed and unchanged previous visit. Past Medical History:   Diagnosis Date    Arthritis     Bipolar affective disorder ([facility] [address]     Deviated nasal septum 12/14/2019    ED (erectile dysfunction)     Essential hypertension 5/3/2021    GERD (gastroesophageal reflux disease)     Gout     HTN (hypertension)     Hyperlipidemia 8/5/2011    Hypothyroidism     Inguinal hernia 11/27/2012    Rupture Achilles tendon     left; resolved. Sleep apnea 8/31/2011    does not use CPAP/uses dental appliance         ROS:  Comprehensive review of systems negative except for as noted above. Objective:       Meds:  Current Outpatient Medications   Medication Sig Dispense Refill    levothyroxine (SYNTHROID) 150 mcg tablet TAKE 1 TABLET DAILY 90 Tablet 3    lithium carbonate 300 mg tablet       atorvastatin (LIPITOR) 10 mg tablet Take 1 Tablet by mouth daily. 90 Tablet 3    cyanocobalamin (Vitamin B-12) 100 mcg tablet Take 100 mcg by mouth daily.       ergocalciferol (Vitamin D2) 1,250 mcg (50,000 unit) capsule Take 50,000 Units by mouth.      sildenafiL, pulmonary hypertension, (REVATIO) 20 mg tablet TAKE 1-3 TABLETS BY MOUTH 1 HOUR BEFORE RELATIONS. ** USE INSTEAD OF CIALIS ** 30 Tablet 11    amLODIPine (Norvasc) 5 mg tablet Take 1 Tablet by mouth daily. 90 Tablet 3    carboxymethylcellulose sodium (Refresh Tears) 0.5 % drop ophthalmic solution Administer 1 Drop to both eyes two (2) times a day. diclofenac (Voltaren Arthritis Pain) 1 % gel Apply  to affected area four (4) times daily. Apply 4 inches 4 times a day to affected area prn 350 g 3    mometasone (NASONEX) 50 mcg/actuation nasal spray 1 [place] by Both Nostrils route nightly. hydrocortisone (HYTONE) 2.5 % topical cream Apply 1 g to affected area two (2) times daily as needed for PRN Reason (Other) (rash on face). use thin layer       lamotrigine (LAMICTAL) 150 mg tablet Take 150 mg by mouth two (2) times a day. quetiapine (SEROQUEL) 100 mg tablet Take 200 mg by mouth nightly. Exam:  Visit Vitals  /68   Pulse (!) 56   Ht 6' 3\" (1.905 m)   Wt 199 lb (90.3 kg)   SpO2 97%   BMI 24.87 kg/m²     NEUROLOGICAL EXAM:  General: Awake, alert, speech fluent. Oriented to self, place, date. CN: PERRL, EOMI without nystagmus, VFF to confrontation, facial sensation and strength are normal and symmetric, hearing is intact to finger rub bilaterally, palate and tongue movements are intact and symmetric. Motor: Normal tone, bulk and strength bilaterally. Reflexes: 2/4 and symmetric, plantar stimulation is flexor. Coordination: FNF, SUMA, HTS intact. No bradykinesia/rigidity, no significant tremors. Sensation: LT intact throughout. Gait: Steady. Decreased R arm swing.      LABS  Results for orders placed or performed in visit on 02/25/22   LITHIUM   Result Value Ref Range    Lithium level 0.7 0.5 - 1.2 mmol/L   CBC WITH AUTOMATED DIFF   Result Value Ref Range    WBC 6.1 3.4 - 10.8 x10E3/uL    RBC 4.35 4.14 - 5.80 x10E6/uL    HGB 13.9 13.0 - 17.7 g/dL    HCT 41.4 37.5 - 51.0 %    MCV 95 79 - 97 fL    MCH 32.0 26.6 - 33.0 pg    MCHC 33.6 31.5 - 35.7 g/dL    RDW 11.2 (L) 11.6 - 15.4 %    PLATELET 173 010 - 344 x10E3/uL    NEUTROPHILS 54 Not Estab. %    Lymphocytes 33 Not Estab. %    MONOCYTES 10 Not Estab. %    EOSINOPHILS 2 Not Estab. %    BASOPHILS 1 Not Estab. %    ABS. NEUTROPHILS 3.4 1.4 - 7.0 x10E3/uL    Abs Lymphocytes 2.0 0.7 - 3.1 x10E3/uL    ABS. MONOCYTES 0.6 0.1 - 0.9 x10E3/uL    ABS. EOSINOPHILS 0.1 0.0 - 0.4 x10E3/uL    ABS. BASOPHILS 0.0 0.0 - 0.2 x10E3/uL    IMMATURE GRANULOCYTES 0 Not Estab. %    ABS. IMM. GRANS. 0.0 0.0 - 0.1 G70W5/DF   METABOLIC PANEL, COMPREHENSIVE   Result Value Ref Range    Glucose 90 65 - 99 mg/dL    BUN 16 8 - 27 mg/dL    Creatinine 1.24 0.76 - 1.27 mg/dL    GFR est non-AA 58 (L) >59 mL/min/1.73    GFR est AA 67 >59 mL/min/1.73    BUN/Creatinine ratio 13 10 - 24    Sodium 141 134 - 144 mmol/L    Potassium 4.5 3.5 - 5.2 mmol/L    Chloride 105 96 - 106 mmol/L    CO2 20 20 - 29 mmol/L    Calcium 10.2 8.6 - 10.2 mg/dL    Protein, total 7.0 6.0 - 8.5 g/dL    Albumin 4.8 (H) 3.7 - 4.7 g/dL    GLOBULIN, TOTAL 2.2 1.5 - 4.5 g/dL    A-G Ratio 2.2 1.2 - 2.2    Bilirubin, total 0.4 0.0 - 1.2 mg/dL    Alk. phosphatase 60 44 - 121 IU/L    AST (SGOT) 22 0 - 40 IU/L    ALT (SGPT) 18 0 - 44 IU/L   LIPID PANEL   Result Value Ref Range    Cholesterol, total 175 100 - 199 mg/dL    Triglyceride 60 0 - 149 mg/dL    HDL Cholesterol 41 >39 mg/dL    VLDL, calculated 12 5 - 40 mg/dL    LDL, calculated 122 (H) 0 - 99 mg/dL       IMAGING:  MRI Results (most recent):  No results found for this or any previous visit. Assessment:     Encounter Diagnoses     ICD-10-CM ICD-9-CM   1. Neuroleptic induced parkinsonism (HCC)  G21.11 332.1     E939.3   2. Functional memory problem  R41.3 780.93   3. MCI Without Memory Loss (Verbal Fluency, Naming, Bilateral Fine Motor Dexterity (left hand worse than right)  G31.84 331.83   4.  Bipolar affective disorder, currently depressed, mild ([facility] [address]  F31.31 34.51     67year old pleasant male with a h/o bipolar disorder, hypothyroidism here for f/u of reported short term recall deficits, intermittent tremors, more recent gait instability s/p L hip surgery 5/2021. Initial examination revealed mild to moderate bradykinesia on finger taps with RUE rigidity, masked facial appearance, slow slightly shuffling gait with multi-step turns. Findings are consistent with evolving Parkinsonism likely secondary to chronic neuroleptic use. Since last evaluation, he has noted improvement with tremor and gait after reduction in lithium dosing. Examination likewise appears improved today. In regards to his short term memory deficits, neuropsychologic testing was recently completed and thankfully not consistent with an evolving dementia process. Findings noted MCI exacerbated by chronic mental health conditions, chronic bipolar disorder and generalized anxiety with functional memory problem secondary to above. Plan:   F/U Psychiatry regarding neuroleptic-induced Parkinsonism and functional memory concerns with related bipolar disorder/anxiety  Referral to  for assistance with verbal fluency/naming    Follow-up and Dispositions    Return if symptoms worsen or fail to improve. I have discussed the diagnosis with the patient today and the intended plan as seen in the above orders with both the patient as well as referring provider and/or PCP via electronic correspondence. The patient has received an after-visit summary and questions were answered concerning future plans. I have discussed medication side effects and warnings with the patient as well.       Signed:  Catherine Clarke DO  9/19/2022

## 2022-11-03 ENCOUNTER — OFFICE VISIT (OUTPATIENT)
Dept: ORTHOPEDIC SURGERY | Age: 72
End: 2022-11-03
Payer: MEDICARE

## 2022-11-03 VITALS — BODY MASS INDEX: 23 KG/M2 | HEIGHT: 75 IN | WEIGHT: 185 LBS

## 2022-11-03 DIAGNOSIS — M22.42 CHONDROMALACIA OF BOTH PATELLAE: ICD-10-CM

## 2022-11-03 DIAGNOSIS — M17.11 PRIMARY OSTEOARTHRITIS OF RIGHT KNEE: ICD-10-CM

## 2022-11-03 DIAGNOSIS — M19.012 PRIMARY OSTEOARTHRITIS, LEFT SHOULDER: ICD-10-CM

## 2022-11-03 DIAGNOSIS — M19.011 PRIMARY OSTEOARTHRITIS, RIGHT SHOULDER: ICD-10-CM

## 2022-11-03 DIAGNOSIS — M54.2 NECK PAIN: Primary | ICD-10-CM

## 2022-11-03 DIAGNOSIS — M50.30 DEGENERATIVE DISC DISEASE, CERVICAL: ICD-10-CM

## 2022-11-03 DIAGNOSIS — M43.6 STIFFNESS OF CERVICAL SPINE: ICD-10-CM

## 2022-11-03 DIAGNOSIS — M22.41 CHONDROMALACIA OF BOTH PATELLAE: ICD-10-CM

## 2022-11-03 PROCEDURE — G8756 NO BP MEASURE DOC: HCPCS | Performed by: ORTHOPAEDIC SURGERY

## 2022-11-03 PROCEDURE — G8536 NO DOC ELDER MAL SCRN: HCPCS | Performed by: ORTHOPAEDIC SURGERY

## 2022-11-03 PROCEDURE — 99214 OFFICE O/P EST MOD 30 MIN: CPT | Performed by: ORTHOPAEDIC SURGERY

## 2022-11-03 PROCEDURE — 3017F COLORECTAL CA SCREEN DOC REV: CPT | Performed by: ORTHOPAEDIC SURGERY

## 2022-11-03 PROCEDURE — 1101F PT FALLS ASSESS-DOCD LE1/YR: CPT | Performed by: ORTHOPAEDIC SURGERY

## 2022-11-03 PROCEDURE — 1123F ACP DISCUSS/DSCN MKR DOCD: CPT | Performed by: ORTHOPAEDIC SURGERY

## 2022-11-03 PROCEDURE — G8427 DOCREV CUR MEDS BY ELIG CLIN: HCPCS | Performed by: ORTHOPAEDIC SURGERY

## 2022-11-03 PROCEDURE — G8420 CALC BMI NORM PARAMETERS: HCPCS | Performed by: ORTHOPAEDIC SURGERY

## 2022-11-03 PROCEDURE — G9717 DOC PT DX DEP/BP F/U NT REQ: HCPCS | Performed by: ORTHOPAEDIC SURGERY

## 2022-11-03 RX ORDER — LITHIUM CARBONATE 300 MG/1
300 TABLET, FILM COATED, EXTENDED RELEASE ORAL 2 TIMES DAILY
COMMUNITY
Start: 2022-10-27

## 2022-11-03 RX ORDER — METHYLPREDNISOLONE 4 MG/1
TABLET ORAL
Qty: 1 DOSE PACK | Refills: 0 | Status: SHIPPED | OUTPATIENT
Start: 2022-11-03 | End: 2022-11-09

## 2022-11-03 RX ORDER — FLUOCINONIDE TOPICAL SOLUTION USP, 0.05% 0.5 MG/ML
SOLUTION TOPICAL
COMMUNITY
Start: 2022-08-16

## 2022-11-03 RX ORDER — DICLOFENAC SODIUM 75 MG/1
75 TABLET, DELAYED RELEASE ORAL 2 TIMES DAILY
Qty: 60 TABLET | Refills: 3 | Status: SHIPPED | OUTPATIENT
Start: 2022-11-03 | End: 2022-11-22

## 2022-11-03 RX ORDER — HYDROCORTISONE 2.5% / KETOCONAZOLE 2% 2.5; 2 G/100G; G/100G
CREAM TOPICAL
COMMUNITY
Start: 2022-07-12

## 2022-11-03 RX ORDER — ACETAMINOPHEN 500 MG
TABLET ORAL
COMMUNITY
Start: 2020-01-01

## 2022-11-03 NOTE — PROGRESS NOTES
Ciaran Roberts (: 1950) is a 67 y.o. male, established patient, here for evaluation of the following chief complaint(s):  Shoulder Pain (Bilateral shoulders), Knee Pain (Bilateral knees), and Neck Pain (center)       ASSESSMENT/PLAN:  Below is the assessment and plan developed based on review of pertinent history, physical exam, labs, studies, and medications. Findings were discussed with the patient today. We discussed regimen of ice, anti-inflammatories, physical therapy, home exercise program, and activity modifications. If there is continued pain and symptoms then we will plan for follow-up in the next 4-6 weeks for further evaluation and treatment planning. 1. Neck pain  -     XR SPINE CERV PA LAT ODONT 3 V MAX; Future  -     diclofenac EC (VOLTAREN) 75 mg EC tablet; Take 1 Tablet by mouth two (2) times a day., Normal, Disp-60 Tablet, R-3  2. Stiffness of cervical spine  -     methylPREDNISolone (Medrol, Jamaal,) 4 mg tablet; Use as directed, Normal, Disp-1 Dose Pack, R-0  -     REFERRAL TO PHYSICAL THERAPY  -     diclofenac EC (VOLTAREN) 75 mg EC tablet; Take 1 Tablet by mouth two (2) times a day., Normal, Disp-60 Tablet, R-3  3. Degenerative disc disease, cervical  -     methylPREDNISolone (Medrol, Jamaal,) 4 mg tablet; Use as directed, Normal, Disp-1 Dose Pack, R-0  -     REFERRAL TO PHYSICAL THERAPY  -     diclofenac EC (VOLTAREN) 75 mg EC tablet; Take 1 Tablet by mouth two (2) times a day., Normal, Disp-60 Tablet, R-3  4. Primary osteoarthritis, left shoulder  -     methylPREDNISolone (Medrol, Jamaal,) 4 mg tablet; Use as directed, Normal, Disp-1 Dose Pack, R-0  -     diclofenac EC (VOLTAREN) 75 mg EC tablet; Take 1 Tablet by mouth two (2) times a day., Normal, Disp-60 Tablet, R-3  5. Primary osteoarthritis of right knee  -     methylPREDNISolone (Medrol, Jamaal,) 4 mg tablet; Use as directed, Normal, Disp-1 Dose Pack, R-0  -     diclofenac EC (VOLTAREN) 75 mg EC tablet;  Take 1 Tablet by mouth two (2) times a day., Normal, Disp-60 Tablet, R-3  6. Chondromalacia of both patellae  -     methylPREDNISolone (Medrol, Jamaal,) 4 mg tablet; Use as directed, Normal, Disp-1 Dose Pack, R-0  -     diclofenac EC (VOLTAREN) 75 mg EC tablet; Take 1 Tablet by mouth two (2) times a day., Normal, Disp-60 Tablet, R-3  7. Primary osteoarthritis, right shoulder  -     methylPREDNISolone (Medrol, Jamaal,) 4 mg tablet; Use as directed, Normal, Disp-1 Dose Pack, R-0  -     diclofenac EC (VOLTAREN) 75 mg EC tablet; Take 1 Tablet by mouth two (2) times a day., Normal, Disp-60 Tablet, R-3      Return in about 6 weeks (around 12/15/2022), or if symptoms worsen or fail to improve. SUBJECTIVE/OBJECTIVE:  Hansa Carballo (: 1950) is a 67 y.o. male. He notes aching pain in the neck, both shoulders, and both knees. His hip seems to be doing well since the hip replacement. He notes that his left shoulder pain is the worst out of everything. He denies significant radicular symptoms at this time from the cervical spine. No Known Allergies    Current Outpatient Medications   Medication Sig    cholecalciferol (VITAMIN D3) (2,000 UNITS /50 MCG) cap capsule     fluocinoNIDE (LIDEX) 0.05 % external solution APPLY TO SCALP ONCE OR TWICE DAILY FOR UP TO TWO WEEKS AT A TIME, THEN AS NEEDED    ketoconazole-hydrocortisone 2-2.5 % crea     sodium chloride (OCEAN) 0.65 % nasal squeeze bottle Saline Mist 0.65 % nasal spray aerosol   Take 1 spray every day by nasal route as needed. lithium carbonate SR (LITHOBID) 300 mg CR tablet     methylPREDNISolone (Medrol, Jamaal,) 4 mg tablet Use as directed    diclofenac EC (VOLTAREN) 75 mg EC tablet Take 1 Tablet by mouth two (2) times a day. levothyroxine (SYNTHROID) 150 mcg tablet TAKE 1 TABLET DAILY    atorvastatin (LIPITOR) 10 mg tablet Take 1 Tablet by mouth daily. cyanocobalamin (VITAMIN B12) 100 mcg tablet Take 100 mcg by mouth daily.     ergocalciferol (ERGOCALCIFEROL) 1,250 mcg (50,000 unit) capsule Take 50,000 Units by mouth.    sildenafiL, pulmonary hypertension, (REVATIO) 20 mg tablet TAKE 1-3 TABLETS BY MOUTH 1 HOUR BEFORE RELATIONS. ** USE INSTEAD OF CIALIS **    amLODIPine (Norvasc) 5 mg tablet Take 1 Tablet by mouth daily. carboxymethylcellulose sodium (CELLUVISC) 0.5 % drop ophthalmic solution Administer 1 Drop to both eyes two (2) times a day. diclofenac (Voltaren Arthritis Pain) 1 % gel Apply  to affected area four (4) times daily. Apply 4 inches 4 times a day to affected area prn    mometasone (NASONEX) 50 mcg/actuation nasal spray 1 [place] by Both Nostrils route nightly. hydrocortisone (HYTONE) 2.5 % topical cream Apply 1 g to affected area two (2) times daily as needed for PRN Reason (Other) (rash on face). use thin layer     lamoTRIgine (LaMICtal) 150 mg tablet Take 150 mg by mouth two (2) times a day. QUEtiapine (SEROquel) 100 mg tablet Take 200 mg by mouth nightly. No current facility-administered medications for this visit.        Social History     Socioeconomic History    Marital status:      Spouse name: Not on file    Number of children: Not on file    Years of education: Not on file    Highest education level: Not on file   Occupational History    Not on file   Tobacco Use    Smoking status: Former    Smokeless tobacco: Never    Tobacco comments:     quit smoking cigarettes/pipe 40 yrs ago   Vaping Use    Vaping Use: Never used   Substance and Sexual Activity    Alcohol use: Yes     Comment:  2-3   drinks per week     Drug use: Never    Sexual activity: Not Currently   Other Topics Concern     Service Not Asked    Blood Transfusions Not Asked    Caffeine Concern Not Asked    Occupational Exposure Not Asked    Hobby Hazards Not Asked    Sleep Concern Not Asked    Stress Concern Not Asked    Weight Concern Not Asked    Special Diet Not Asked    Back Care Not Asked    Exercise Not Asked    Bike Helmet Not Asked    Seat Belt Not Asked Self-Exams Not Asked   Social History Narrative    ** Merged History Encounter **          Social Determinants of Health     Financial Resource Strain: Not on file   Food Insecurity: Not on file   Transportation Needs: Not on file   Physical Activity: Not on file   Stress: Not on file   Social Connections: Not on file   Intimate Partner Violence: Not on file   Housing Stability: Not on file       Past Surgical History:   Procedure Laterality Date    COLONOSCOPY N/A 5/22/2018    COLONOSCOPY performed by Cassie Hogan MD at [facility] ENDOSCOPY    COLONOSCOPY N/A 10/19/2021    COLONOSCOPY (bringing) performed by Rob Wells MD at [facility] ENDOSCOPY    HX COLONOSCOPY  10/13/14    polypectomy, f/u 3 y . dr. Cecilio Reddy Right     tendon repair of thumb    HX ORTHOPAEDIC      left hip    HX OTHER SURGICAL  2015    right inguinal hernia repair    HX OTHER SURGICAL      colonoscopy - hemorrhoids    HX TONSILLECTOMY  1960's       Family History   Problem Relation Age of Onset    Other Father         Snoring, insomnia    Suicide Father     Heart Attack Mother 79    Bipolar Disorder Mother     No Known Problems Sister     Bipolar Disorder Maternal Aunt     Anesth Problems Neg Hx                REVIEW OF SYSTEMS:    Patient denies any recent fever, chills, nausea, vomiting, chest pain, or shortness of breath. Vitals:  Ht 6' 3\" (1.905 m)   Wt 185 lb (83.9 kg)   BMI 23.12 kg/m²    Body mass index is 23.12 kg/m². PHYSICAL EXAM:  General exam: Patient is awake, alert, and oriented x3. Well-appearing. No acute distress. Ambulates with an antalgic gait    Neck: There is tenderness to palpation in the paraspinal region. No obvious midline tenderness to palpation. There is stiffness with flexion and extension of the cervical spine. Negative Spurling's exam.  No erythema or ecchymosis. Left shoulder: Neurovascular and sensory intact. There is tenderness to palpation at the anterior lateral shoulder. Slight limitation in passive range of motion on exam.  There is pain with active overhead range of motion. Pain is noted with impingement testing including Burnham exam.  Pain is also noted with rotator cuff strength testing including resisted abduction and resisted external rotation. Normal stability. There is some tenderness palpation at the [facility] [place] joint and pain with crossarm exam.    Right shoulder: Neurovascular and sensory intact. There is tenderness to palpation at the anterior lateral shoulder. Slight limitation in passive range of motion on exam.  There is pain with active overhead range of motion. Pain is noted with impingement testing including Burnham exam.  Pain is also noted with rotator cuff strength testing including resisted abduction and resisted external rotation. Normal stability. There is some tenderness palpation at the [facility] [place] joint and pain with crossarm exam.    Bilateral knees: Neurovascular and sensory intact. There is tenderness to palpation in the parapatellar region. There is crepitus with range of motion. No significant effusion noted. There is no joint line tenderness to palpation. Bismark's maneuver is nonpainful. Normal stability on Lachman's exam and anterior/posterior drawer testing. No erythema or ecchymosis. IMAGING:    XR Results (most recent):  Results from Appointment encounter on 11/03/22    XR SPINE CERV PA LAT ODONT 3 V MAX    Narrative  X-rays of the cervical spine 2 views done today show evidence of straightening of the normal cervical lordosis. There is also evidence of disc base narrowing and early osteophyte formation at the mid to lower cervical spine. There is evidence of calcific deposit posterior to the spinous processes      Results from Hospital Encounter encounter on 05/12/21    XR HIP LT W OR WO PELV  1 VW    Narrative  INDICATION:  Left hip replacement, history of left hip osteoarthritis .     Portable AP view of the left hip demonstrates left hip replacement with normal  alignment. Postoperative changes are evident in the soft tissues. Impression  Left hip replacement with normal alignment. XR HIP LT DURING OR PROC    Narrative  INDICATION: Surgery. Left hip osteoarthritis, Compliance only. Impression  Single fluoroscopic AP view of the left hip demonstrates a hip  prosthesis in gross anatomic alignment and without evidence of orthopedic  hardware complication. Orders Placed This Encounter    XR SPINE CERV 2- 3 VWS     Standing Status:   Future     Number of Occurrences:   1     Standing Expiration Date:   11/3/2023    REFERRAL TO PHYSICAL THERAPY     Referral Priority:   Routine     Referral Type:   PT/OT/ST     Referral Reason:   Specialty Services Required     Number of Visits Requested:   1    methylPREDNISolone (Medrol, Jamaal,) 4 mg tablet     Sig: Use as directed     Dispense:  1 Dose Pack     Refill:  0    diclofenac EC (VOLTAREN) 75 mg EC tablet     Sig: Take 1 Tablet by mouth two (2) times a day. Dispense:  60 Tablet     Refill:  3              An electronic signature was used to authenticate this note.   -- Barry Rodriguez, DO

## 2022-11-03 NOTE — LETTER
11/3/2022    Patient: Ashley Flor   YOB: 1950   Date of Visit: 11/3/2022     [facility], [address] [place] [address] [place] Best    Dear [facility], MD,      Thank you for referring Mr. Cinthya Fernandes to [facility] for evaluation. My notes for this consultation are attached. If you have questions, please do not hesitate to call me. I look forward to following your patient along with you.       Sincerely,    Jarrett Rivas, DO

## 2022-11-08 ENCOUNTER — OFFICE VISIT (OUTPATIENT)
Dept: ORTHOPEDIC SURGERY | Age: 72
End: 2022-11-08
Payer: MEDICARE

## 2022-11-08 DIAGNOSIS — M54.2 NECK PAIN: Primary | ICD-10-CM

## 2022-11-08 DIAGNOSIS — M43.6 STIFFNESS OF CERVICAL SPINE: ICD-10-CM

## 2022-11-08 DIAGNOSIS — M50.30 DEGENERATIVE DISC DISEASE, CERVICAL: ICD-10-CM

## 2022-11-08 PROCEDURE — 97161 PT EVAL LOW COMPLEX 20 MIN: CPT | Performed by: PHYSICAL THERAPIST

## 2022-11-08 PROCEDURE — 97110 THERAPEUTIC EXERCISES: CPT | Performed by: PHYSICAL THERAPIST

## 2022-11-08 PROCEDURE — 97140 MANUAL THERAPY 1/> REGIONS: CPT | Performed by: PHYSICAL THERAPIST

## 2022-11-08 NOTE — PROGRESS NOTES
CERVICAL SPINE EVALUATION  Patient Name: Yulia Michael  Date:2022  : 1950  [x]  Patient  Verified  Payor: Yamilet Jorgensen / Plan: [place] PART [place] & B / Product Type: Medicare /    Total Treatment Time (min): 45  Total Timed Codes (min): 45  1:1 Treatment Time ( [address] only): 45   Referring Physician:   Thai Mcpherson DO       1. Neck pain  2. Stiffness of cervical spine  3. Degenerative disc disease, cervical      SUBJECTIVE  Patient is a 67-year-old male referred to physical therapy by Dr. Gregorio Mendez for neck/shoulder pain. He was seen here for the same diagnosis this past summer and responded very well to PT. Unfortunately his symptoms have returned recently without any specific incident. Pain located along his neck and in the tops of both shoulders, left slightly greater than right. He denies any radiating pain down the arms or numbness/tingling. No headaches. Aggravated by heavy lifting. He does have a hard time turning his head to the left particularly with driving. He is also having difficulty sleeping. He does state he has been taking diclofenac recently which is helped substantially. He has also been doing his home exercises but would like to review these. He just signed up to do water aerobics again. His goals for PT are to narrow down his list of home exercises to focus upon. Past Medical History:   Diagnosis Date    Arthritis     Bipolar affective disorder ([facility] [address]     Deviated nasal septum 2019    ED (erectile dysfunction)     Essential hypertension 5/3/2021    GERD (gastroesophageal reflux disease)     Gout     HTN (hypertension)     Hyperlipidemia 2011    Hypothyroidism     Inguinal hernia 2012    Rupture Achilles tendon     left; resolved.     Sleep apnea 2011    does not use CPAP/uses dental appliance        OBJECTIVE  Observations: Holds a forward head and rounded shoulder posture, accentuated upper thoracic kyphosis    Palpation: Tenderness to bilateral upper trap, levator    ROM: Able to flex chin to chest with some tightness reported. Extension is limited about 75% with some discomfort. Left rotation limited to 45 degrees with some pain, 55 degrees on the right. Left side bend 15 degrees, right 20 degrees with tightness reported    No pain with passive cervical flexion. Firm end feel with bilateral rotation, much tighter to the left versus right. Also tightness with bilateral side bend. Active shoulder flexion limited to 120 degrees bilaterally but is pain-free. Otherwise shoulder mobility is within normal limits and pain-free    Strength: Grossly 5/5, no pain with resisted testing    Joint mobility: Pain with mobilization C5-C7    Special tests: Relief with traction    NDI: 16    Treatment:  Performed evaluation (20 minutes)    Manual therapy (10 minutes)  Cervical traction with gentle suboccipital release. Contract relax for left cervical rotation. Bilateral upper trap stretching    Therapeutic exercise (15 minutes): Provided and educated patient in home exercise program for cervical/thoracic spine mobility and postural strengthening. He performed exercises in the clinic focused on the same. We discussed patient goals and collaborated upon a plan of care              ASSESSMENT    Patient presents with impairments related to posture, cervical spine range of motion, thoracic spine mobility, impaired ability to perform ADLs, lift heavy objects, and participate in desired activity second to neck/bilateral shoulder pain. Presentation consistent with OA. He will benefit from outpatient physical therapy services to address above limitations and maximize function. Short-term Goals (1 week)  1. Patient will demonstrate independence with home exercise program to enhance recovery. Long-term Goals (4-6 weeks)  1. Patient will report at least 25% decrease in pain to maximize activity tolerance.   2. Patient will demonstrate at least 5 degree improvement in cervical rotation to improve ability to drive  3. Patient will report at least 10% improvement in ability to sleep through the night    Frequency: 1-2x per week. 20 visits. Interventions to include but are not limited to joint mobilizations, myofascial release, soft tissue mobilization, therapeutic exercise, neuromuscular reeducation, dry needling, taping, and modalities as indicated. Xavi Nunez, PT 11/8/2022  2:00 PM    The referring physician has reviewed and approved this evaluation and plan of care as noted by the electronic signature attached to note.

## 2022-11-16 ENCOUNTER — OFFICE VISIT (OUTPATIENT)
Dept: ORTHOPEDIC SURGERY | Age: 72
End: 2022-11-16
Payer: MEDICARE

## 2022-11-16 DIAGNOSIS — M43.6 STIFFNESS OF CERVICAL SPINE: ICD-10-CM

## 2022-11-16 DIAGNOSIS — M50.30 DEGENERATIVE DISC DISEASE, CERVICAL: ICD-10-CM

## 2022-11-16 DIAGNOSIS — M54.2 NECK PAIN: Primary | ICD-10-CM

## 2022-11-16 PROCEDURE — 97110 THERAPEUTIC EXERCISES: CPT | Performed by: PHYSICAL THERAPIST

## 2022-11-16 PROCEDURE — 97140 MANUAL THERAPY 1/> REGIONS: CPT | Performed by: PHYSICAL THERAPIST

## 2022-11-16 NOTE — PROGRESS NOTES
PT DAILY TREATMENT NOTE    Patient Name: Kapil Rizo  Date:2022  : 1950  [x]  Patient  Verified  Payor: Lorin Monteiro / Plan: [place] / Product Type: Medicare /    Total Treatment Time (min): 40  Total Timed Codes (min): 30  1:1 Treatment Time ( [address] only): 30   Referring Physician: aKl Rodriguez DO     Treatment Area: Neck/shoulders    SUBJECTIVE  Patient reports feeling much better. Doing well with home exercises    OBJECTIVE  Modality:   []  E-Stim: type _ x _ min     []att   []unatt   []w/US   []w/ice   []w/heat  []  Ultrasound: []cont   []pulse    _ W/cm2 x _  min   []1MHz   []3MHz  []  Ice pack _  min       []  Hot pack _  min       []  Other:     Therapeutic Exercise: (minutes: 25) one-on-one 15  [x] see exercise log      Added/Changed Exercises:  []  Added:   []  Changed:       Manual Therapy: (minutes: 15)  Cervical traction with suboccipital release. Soft tissue work to left upper trap and paraspinals. Lateral rotation and upper trap stretching      Patient Education: [x] Review HEP    [] Progressed/Changed HEP:      Other Objective/Functional Measures:     ASSESSMENT  [x]  See Plan of Care  []  See progress note/recertification  []  Patient will continue to benefit from skilled therapy to address remaining functional deficits:     Responding very favorably to home program.  He feels confident in his ability to continue independently and I feel this is appropriate. No future visits scheduled at this time      ICD-10-CM ICD-9-CM    1. Neck pain  M54.2 723.1       2. Stiffness of cervical spine  M43.6 723.5       3.  Degenerative disc disease, cervical  M50.30 722.4           PLAN  [] Progress as tolerated under current plan towards long-term goals  [x] Discharge  [] Other:        PT Exercise Log         Activity/Exercise Date  22      Pulleys x     Rows x     Flys x     Lat pulldown x     Thoracic spine chair extension x     Corner stretch x     Chin tuck x Radha Fitzgerald, PT 11/16/2022  1:30 PM

## 2022-12-16 ENCOUNTER — OFFICE VISIT (OUTPATIENT)
Dept: NEUROLOGY | Age: 72
End: 2022-12-16
Payer: MEDICARE

## 2022-12-16 DIAGNOSIS — R41.3 FUNCTIONAL MEMORY PROBLEM: Primary | ICD-10-CM

## 2022-12-16 DIAGNOSIS — G31.84 MILD COGNITIVE IMPAIRMENT: ICD-10-CM

## 2022-12-16 NOTE — PROGRESS NOTES
Prior to seeing the patient I reviewed the records, including the previously completed report, the records in [facility], and any updated visits from other providers since I saw the patient last.      Today, I engaged in a psychoeducational and supportive and cognitive/behavioral psychotherapy session with the patient and spouse. I provided psychotherapy in the form of psychoeducation and support with respect to the results of the recent Neuropsychological Evaluation, including discussing individual tests as well as patient's areas of neurocognitive strength versus weakness. We discussed, in detail, the following: This patient generated a predominantly normal Neuropsychological Evaluation with respect to neurocognitive functioning. In this regard, he is showing issues with dominant handed fine motor dexterity (for which neurologic correlation is indicated), nondominant handed fine motor dexterity to a lesser degree, verbal fluency, and confrontation naming. At the same time, his performances across all other neurocognitive domains assessed, including mental status, auditory learning, auditory memory, sustained attention, verbal comprehension, perceptual reasoning, working memory, processing speed, and executive functioning (though slow) remain normal.  From an emotional standpoint, the patient was quite defensive in his response style on personality testing. He denies clinically significant psychopathology within this context. There are concerns about paranoia, OCD, and generalized anxiety, though and carries a chronic bipolar disorder dx. Memory appears to functionally be worse when he is more depressed. The patient is followed by psychiatry and I defer to Dr. Taylor Landis for diagnostic considerations within this context. Thankfully, this profile is not consistent with a dementia type process.   Instead, this is a mild cognitive impairment exacerbated by chronic underlying mental health concerns. In addition to continued medical and psychiatric care, my recommendations include consult with speech and occupational therapy to assist with fluency, naming, and fine motor dexterity issues. Neurologic correlation is indicated with respect to possible lateralization here. Otherwise, it is quite reassuring to note that his day-to-day memory issues appear functional in etiology. At present, I am not concerned about competency/capacity, driving, day-to-day supervision, etc.  The patient should be encouraged to remain as mentally, physically, and socially active as possible. We now have extensive baseline neurocognitive and psychologic data on him. Follow-up yearly, or as needed. Clinical correlation is, of course, indicated. I will discuss these findings with the patient and family when they follow up with me in the near future. A follow up Neuropsychological Evaluation is indicated on a prn basis. 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 Paranoia                          ? OCD                          Functional Memory Problem (2/2 the above)        Education was provided regarding my diagnostic impressions, and we discussed treatment plan/options. I also answered numerous questions related to the clinical findings, including discussing various methods to improve cognition and mood. Counseling provided regarding mood and cognition. CBT and supportive psychotherapy techniques were utilized. Supportive/Cognitive Behavioral/Solution Focused psychotherapy provided  Discussed rational versus irrational thinking patterns and their consequences. Discussed healthy/adaptive and unhealthy/maladaptive coping. The patient needs to follow with neuro, psychiatry, psych as needed. Reassuringly there Is no dementia. Need to treat ADD though, and will talk to Dr. Deepa Wang about that.      The patient had the following concerns which I deferred to their referring provider: meds for mood/cognition      Time spent today: 20

## 2023-06-05 ASSESSMENT — SLEEP AND FATIGUE QUESTIONNAIRES
HOW LIKELY ARE YOU TO NOD OFF OR FALL ASLEEP WHEN YOU ARE A PASSENGER IN A CAR FOR AN HOUR WITHOUT A BREAK: 1
HOW LIKELY ARE YOU TO NOD OFF OR FALL ASLEEP IN A CAR, WHILE STOPPED FOR A FEW MINUTES IN TRAFFIC: WOULD NEVER DOZE
HOW LIKELY ARE YOU TO NOD OFF OR FALL ASLEEP WHILE LYING DOWN TO REST IN THE AFTERNOON WHEN CIRCUMSTANCES PERMIT: 1
DO YOU GENERALLY HAVE DIFFICULTY REMEMBERING THINGS BECAUSE YOU ARE SLEEPY OR TIRED: YES, A LITTLE
SELECT ANY OF THE FOLLOWING BEHAVIORS OBSERVED WHILE PATIENT ASLEEP: LIGHT SNORING
DO YOU HAVE DIFFICULTY BEING AS ACTIVE AS YOU WANT TO BE IN THE EVENING BECAUSE YOU ARE SLEEPY OR TIRED: YES, LITTLE
AVERAGE NUMBER OF SLEEP HOURS: 9
DO YOU HAVE DIFFICULTY BEING AS ACTIVE AS YOU WANT TO BE IN THE MORNING BECAUSE YOU ARE SLEEPY OR TIRED: YES, MODERATE
HOW LIKELY ARE YOU TO NOD OFF OR FALL ASLEEP WHILE SITTING AND TALKING TO SOMEONE: 0
HOW LIKELY ARE YOU TO NOD OFF OR FALL ASLEEP WHILE SITTING AND READING: SLIGHT CHANCE OF DOZING
HAS YOUR MOOD BEEN AFFECTED BECAUSE YOU ARE SLEEPY OR TIRED: YES, LITTLE
HOW LIKELY ARE YOU TO NOD OFF OR FALL ASLEEP WHILE SITTING INACTIVE IN A PUBLIC PLACE: 0
DO YOU GET TOO LITTLE SLEEP AT NIGHT: NO
FOSQ SCORE: 16
SELECT ANY OF THE FOLLOWING BEHAVIORS OBSERVED WHILE YOU ARE ASLEEP: LIGHT SNORING
AVERAGE NUMBER OF SLEEP HOURS: 9
DO YOU GET TOO LITTLE SLEEP AT NIGHT: DOES NOT
WHAT TIME DO YOU USUALLY GO TO BED: 22:00
ARE YOU BOTHERED BY WAKING UP TOO EARLY AND NOT BEING ABLE TO GET BACK TO SLEEP: NO
HOW LIKELY ARE YOU TO NOD OFF OR FALL ASLEEP IN A CAR, WHILE STOPPED FOR A FEW MINUTES IN TRAFFIC: 0
HOW LONG DO YOU NAP: 15 TO 30 MINUTES
DO YOU HAVE DIFFICULTY WATCHING A MOVIE OR VIDEO BECAUSE YOU BECOME SLEEPY OR TIRED: YES, A LITTLE
HOW LIKELY ARE YOU TO NOD OFF OR FALL ASLEEP WHILE SITTING QUIETLY AFTER LUNCH WITHOUT ALCOHOL: 0
DO YOU TAKE NAPS: YES
HOW LIKELY ARE YOU TO NOD OFF OR FALL ASLEEP WHILE SITTING QUIETLY AFTER LUNCH WITHOUT ALCOHOL: WOULD NEVER DOZE
DO YOU HAVE DIFFICULTY VISITING YOUR FAMILY OR FRIENDS IN THEIR HOME BECAUSE YOU BECOME SLEEPY OR TIRED: YES, A LITTLE
DO YOU HAVE DIFFICULTY OPERATING A MOTOR VEHICLE FOR LONG DISTANCES (GREATER THAN 100 MILES) BECAUSE YOU BECOME SLEEPY: NO
HOW LIKELY ARE YOU TO NOD OFF OR FALL ASLEEP WHILE WATCHING TV: WOULD NEVER DOZE
HOW LIKELY ARE YOU TO NOD OFF OR FALL ASLEEP WHILE SITTING INACTIVE IN A PUBLIC PLACE: WOULD NEVER DOZE
HOW LIKELY ARE YOU TO NOD OFF OR FALL ASLEEP WHILE SITTING AND READING: 1
DO YOU HAVE PROBLEMS WITH FREQUENT AWAKENINGS AT NIGHT: NO
ARE YOU BOTHERED BY WAKING UP TOO EARLY AND NOT BEING ABLE TO GET BACK TO SLEEP: IS NOT
ESS TOTAL SCORE: 3
HOW LIKELY ARE YOU TO NOD OFF OR FALL ASLEEP WHILE LYING DOWN TO REST IN THE AFTERNOON WHEN CIRCUMSTANCES PERMIT: SLIGHT CHANCE OF DOZING
HOW LIKELY ARE YOU TO NOD OFF OR FALL ASLEEP WHEN YOU ARE A PASSENGER IN A CAR FOR AN HOUR WITHOUT A BREAK: SLIGHT CHANCE OF DOZING
DO YOU WORK SHIFTS: NO
NUMBER OF TIMES YOU WAKE PER NIGHT: 0
DO YOU HAVE DIFFICULTY OPERATING A MOTOR VEHICLE FOR SHORT DISTANCES (LESS THAN 100 MILES) BECAUSE YOU BECOME SLEEPY: NO
DO YOU HAVE DIFFICULTY CONCENTRATING ON THE THINGS YOU DO BECAUSE YOU ARE SLEEPY OR TIRED: YES, A LITTLE
HOW LIKELY ARE YOU TO NOD OFF OR FALL ASLEEP WHILE WATCHING TV: 0
HAS YOUR RELATIONSHIP WITH FAMILY, FRIENDS OR WORK COLLEAGUES BEEN AFFECTED BECAUSE YOU ARE SLEEPY OR TIRED: NO
HOW MANY NAPS DO YOU TAKE PER WEEK: 2
HOW LIKELY ARE YOU TO NOD OFF OR FALL ASLEEP WHILE SITTING AND TALKING TO SOMEONE: WOULD NEVER DOZE

## 2023-06-08 ENCOUNTER — OFFICE VISIT (OUTPATIENT)
Age: 73
End: 2023-06-08
Payer: MEDICARE

## 2023-06-08 VITALS
BODY MASS INDEX: 24.74 KG/M2 | HEART RATE: 59 BPM | WEIGHT: 199 LBS | OXYGEN SATURATION: 95 % | DIASTOLIC BLOOD PRESSURE: 63 MMHG | HEIGHT: 75 IN | TEMPERATURE: 98.7 F | SYSTOLIC BLOOD PRESSURE: 122 MMHG

## 2023-06-08 DIAGNOSIS — G47.33 OSA (OBSTRUCTIVE SLEEP APNEA): Primary | ICD-10-CM

## 2023-06-08 DIAGNOSIS — F31.9 BIPOLAR AFFECTIVE DISORDER, REMISSION STATUS UNSPECIFIED (HCC): ICD-10-CM

## 2023-06-08 PROCEDURE — 3078F DIAST BP <80 MM HG: CPT | Performed by: NURSE PRACTITIONER

## 2023-06-08 PROCEDURE — 1123F ACP DISCUSS/DSCN MKR DOCD: CPT | Performed by: NURSE PRACTITIONER

## 2023-06-08 PROCEDURE — 3074F SYST BP LT 130 MM HG: CPT | Performed by: NURSE PRACTITIONER

## 2023-06-08 PROCEDURE — 99215 OFFICE O/P EST HI 40 MIN: CPT | Performed by: NURSE PRACTITIONER

## 2023-06-08 NOTE — PROGRESS NOTES
of the visit. An electronic signature was used to authenticate this note.     -- GLENYS Raman NP, [facility]  06/08/23

## 2023-06-08 NOTE — PATIENT INSTRUCTIONS
be needed to remove enlarged tissues in the throat. Follow-up care is a key part of your treatment and safety. Be sure to make and go to all appointments, and call your doctor if you are having problems. It's also a good idea to know your test results and keep a list of the medicines you take. How can you care for yourself at home? Lose weight, if needed. It may reduce the number of times you stop breathing or have slowed breathing. Go to bed at the same time every night. Sleep on your side. It may stop mild apnea. If you tend to roll onto your back, sew a pocket in the back of your pa[employer] top. Put a tennis ball into the pocket, and stitch the pocket shut. This will help keep you from sleeping on your back. Avoid alcohol and medicines such as sleeping pills and sedatives before bed. Do not smoke. Smoking can make sleep apnea worse. If you need help quitting, talk to your doctor about stop-smoking programs and medicines. These can increase your chances of quitting for good. Prop up the head of your bed 4 to 6 inches by putting bricks under the legs of the bed. Treat breathing problems, such as a stuffy nose, caused by a cold or allergies. Use a continuous positive airway pressure (CPAP) breathing machine if lifestyle changes do not help your apnea and your doctor recommends it. The machine keeps your airway from closing when you sleep. If CPAP does not help you, ask your doctor whether you should try other breathing machines. A bilevel positive airway pressure machine has two types of air pressureâone for breathing in and one for breathing out. Another device raises or lowers air pressure as needed while you breathe. If your nose feels dry or bleeds when using one of these machines, talk with your doctor about increasing moisture in the air. A humidifier may help.   If your nose is runny or stuffy from using a breathing machine, talk with your doctor about using decongestants or a corticosteroid nasal

## 2023-06-18 ENCOUNTER — HOSPITAL ENCOUNTER (OUTPATIENT)
Facility: HOSPITAL | Age: 73
Discharge: HOME | End: 2023-06-21
Payer: MEDICARE

## 2023-06-18 PROCEDURE — 95811 POLYSOM 6/>YRS CPAP 4/> PARM: CPT

## 2023-06-18 PROCEDURE — 95810 POLYSOM 6/> YRS 4/> PARAM: CPT

## 2023-06-19 VITALS
BODY MASS INDEX: 24.74 KG/M2 | TEMPERATURE: 98.6 F | OXYGEN SATURATION: 96 % | HEIGHT: 75 IN | HEART RATE: 57 BPM | DIASTOLIC BLOOD PRESSURE: 69 MMHG | WEIGHT: 199 LBS | SYSTOLIC BLOOD PRESSURE: 139 MMHG

## 2023-06-21 ENCOUNTER — TELEPHONE (OUTPATIENT)
Age: 73
End: 2023-06-21

## 2023-06-21 DIAGNOSIS — G47.33 OSA (OBSTRUCTIVE SLEEP APNEA): Primary | ICD-10-CM

## 2023-07-10 ENCOUNTER — TELEPHONE (OUTPATIENT)
Age: 73
End: 2023-07-10

## 2023-07-10 NOTE — TELEPHONE ENCOUNTER
Patient left voicemail to schedule appointment. Looks like patient should be scheduling either a Titration sleep study or results appointment to discuss last sleep study further with Batsheva Servin NP. Returned patients call but had to leave .

## 2023-07-17 ENCOUNTER — TELEPHONE (OUTPATIENT)
Age: 73
End: 2023-07-17

## 2023-07-26 ENCOUNTER — TELEPHONE (OUTPATIENT)
Age: 73
End: 2023-07-26

## 2023-07-26 NOTE — TELEPHONE ENCOUNTER
Patient called back in reference to scheduling a sooner appt for tomorrow with HANNAH Tong, patient denied appt, stating he has another appt scheduled.

## 2023-07-26 NOTE — TELEPHONE ENCOUNTER
Voicemail left for patient to call office to offer 7/27/2023 appointment with Mrs. Anand Flores (per her instruction).

## 2023-09-07 ENCOUNTER — TELEPHONE (OUTPATIENT)
Age: 73
End: 2023-09-07

## 2023-09-11 ENCOUNTER — HOSPITAL ENCOUNTER (OUTPATIENT)
Facility: HOSPITAL | Age: 73
Discharge: HOME | End: 2023-09-14
Payer: MEDICARE

## 2023-09-11 DIAGNOSIS — G47.33 OSA (OBSTRUCTIVE SLEEP APNEA): ICD-10-CM

## 2023-09-11 PROCEDURE — 95811 POLYSOM 6/>YRS CPAP 4/> PARM: CPT | Performed by: INTERNAL MEDICINE

## 2023-09-12 VITALS
HEART RATE: 55 BPM | HEIGHT: 75 IN | OXYGEN SATURATION: 99 % | SYSTOLIC BLOOD PRESSURE: 139 MMHG | WEIGHT: 206 LBS | BODY MASS INDEX: 25.61 KG/M2 | DIASTOLIC BLOOD PRESSURE: 56 MMHG | TEMPERATURE: 98.6 F

## 2023-09-12 NOTE — PROGRESS NOTES
Sleep Study Technical Notes   Disclaimer:  all notes have not been confirmed by interpreting physician      PRE-Test:  Nedra Barthel (: 1950) arrived in the lobby. The patient was taken to the Sleep Center and taken directly to his/her room. Procedure explained to the patient and questions were answered. The patient expressed understanding of the procedure. Electrodes were applied without incident. The patient was placed in bed and the study was started. Acquisition Notes:  Lights off: 10:06 PM    Respiratory events:   A__N    H__N  C__N  M__N  Snoring:  None  Sleep Stages:  REM   Y    PAP titration information in Sleep Study CPAP Evaluation  Other comments: The study ordered was a CPAP titration, and the hook-up process was completed without issue. Patient was shown a few masks due to incompliance in past with CPAP, he decided he felt most comfortable on ResMed AirTouch N20 Large Nasal Mask. He was started on a pressure of 5cm. After being placed in bed, the lights were turned off at 10:06 PM, with sleep onset occurring around 11:34 PM. Throughout the study, stages 1, 2, and REM were identified. The patient's sleep positions included supine and left side, accompanied by mild to no snoring. Patient was on 5cm the entire night, no events were observed by tech. Lights were on at 6:15 AM.        Patient had caregiver in attendance:  N  Patient to bathroom 0 times      POST Test:  Patient was awakened. Electrodes were removed. The patient was discharged after answering the Post Questionnaire. Equipment and room cleaned per infection control policy.

## 2023-09-25 ENCOUNTER — TELEPHONE (OUTPATIENT)
Age: 73
End: 2023-09-25

## 2023-09-25 DIAGNOSIS — G47.33 OSA (OBSTRUCTIVE SLEEP APNEA): Primary | ICD-10-CM

## 2023-09-26 ENCOUNTER — CLINICAL DOCUMENTATION (OUTPATIENT)
Age: 73
End: 2023-09-26

## 2023-09-26 NOTE — TELEPHONE ENCOUNTER
Polysomnography with PAP titration interpreted, device ordered. Encounter Diagnosis   Name Primary? TREVOR (obstructive sleep apnea) Yes         Orders Placed This Encounter   Procedures    DME Order for (Specify) as OP     - DME device ordered - ResMed Device with Heated Humidifer  / .   - Diagnosis: Obstructive Sleep Apnea (G47.33)  - Length of Need: Lifetime    Pressure Settin - 10 cmH2O     Nasal Cushion (Replace) 2 per month.  Nasal Interface Mask 1 every 3 months.  Headgear 1 every 6 months.  Filter(s) Disposable 2 per month.  Filter(s) Non-Disposable 1 every 6 months. [address]  for [employer] (Replace) 1 every 6 months.  Tubing with heating element 1 every 3 months. Perform Mask Fitting per patient preference and comfort - replace as above. Martínez Jj MD, FAASM; NPI: [id number]  Electronically signed.  2023

## 2023-10-25 ENCOUNTER — TELEPHONE (OUTPATIENT)
Age: 73
End: 2023-10-25

## 2023-10-25 NOTE — TELEPHONE ENCOUNTER
Patient called and asked for Jad Dover to call him to discuss and prepare for upcoming appointment and alternatives to the sleep studies because he hasn't been having much luck with them and also added that this has already been discussed between them.  [place] and [place] best cell signal. He would like to have a chat before this Friday (10/27/2023) and [phone] is the best number

## 2023-10-26 NOTE — TELEPHONE ENCOUNTER
Sent patient my chart message, confirming that we can speak by telephone on Friday when he is in town and has cell signal.

## 2023-10-27 ENCOUNTER — CLINICAL DOCUMENTATION (OUTPATIENT)
Age: 73
End: 2023-10-27

## 2023-10-27 ENCOUNTER — TELEPHONE (OUTPATIENT)
Age: 73
End: 2023-10-27

## 2023-10-27 DIAGNOSIS — G47.33 OSA (OBSTRUCTIVE SLEEP APNEA): Primary | ICD-10-CM

## 2023-10-27 NOTE — TELEPHONE ENCOUNTER
Tyson Meraz (: 1950) last seen by me on 2023, previously seen by Dr. Logan Turner on 2021, prior notes and extensive review of prior sleep testing including upload of prior records to media. Initial sleep apnea diagnosis in approx , re-evaluated 2013 with home sleep testing showing Avg AHI of 13/hr, treated with CPAP and BiPAP, changed to Oral appliance due to PAP tolerance issues . Home sleep test in  appears to be the only diagnostic sleep apnea test. All other tests were ordered to measure efficacy of oral appliance therapy. Efficacy of oral appliance evaluated with in lab sleep test 2019 which showed AHI of 8.1/hr with a lowest SpO2 of 87%, duration of SpO2 < 88% 4.9 min with use of oral appliance. Weight at time of sleep testing 191 pounds. Efficacy of oral appliance Re-evaluated with in lab sleep test 3/2020 showed AHI of 4.9/hr with a lowest SpO2 of 89%, duration of SpO2 < 88% 0 min. Supine AHI 9.0/hr, REM related AHI 35.7/hr.  Weight at time of sleep testing 191 pounds, oral appliance reported as used during testing. Repeat diagnostic in lab sleep test 2023 showed AHI of 15.9/hr with a lowest SpO2 of 91%. Weight at time of sleep testing 199 pounds. Titration completed 2023 adequate at pressure of 5 cm H2O with N20 nasal mask. Called patient to review results, plan discussed with patient and his wife John St. Prior CPAP/BiPAP failure, treated with oral appliance but his clinical response not adequate, continued to have symptoms despite oral appliance therapy and stopped due to tooth broken while wearing device. He is willing to retry PAP at low pressure. Order to be sent to [facility] [place] - email sent to Wei LOVE already scheduled for 2024    Orders Placed This Encounter   Procedures    DME Order for [facility]) as OP     Diagnosis: (G47.33) TREVOR (obstructive sleep apnea)  (primary encounter diagnosis)      Positive Airway Pressure Therapy:

## 2023-10-27 NOTE — TELEPHONE ENCOUNTER
Faxed pap order to [facility]. Called patient to inform and gave patient the telephone number to [facility]. Patient is already scheduled for 1st Adherence follow up visit.

## 2023-11-18 ENCOUNTER — PATIENT MESSAGE (OUTPATIENT)
Age: 73
End: 2023-11-18

## 2023-12-14 ENCOUNTER — PROCEDURE VISIT (OUTPATIENT)
Age: 73
End: 2023-12-14

## 2023-12-14 DIAGNOSIS — G47.33 OSA (OBSTRUCTIVE SLEEP APNEA): Primary | ICD-10-CM

## 2023-12-14 NOTE — PROGRESS NOTES
Patient was seen today for assistance with setting up his new device. Patient was instructed on the the setup and usage of his new S10. Patient was fitted with a F30i Medium due to oral breathing. Patient was able to demonstrate how to change temperature, mask and run a mask fit. He was also shown how to download the [employer].

## 2024-01-04 ENCOUNTER — TELEPHONE (OUTPATIENT)
Age: 74
End: 2024-01-04

## 2024-01-04 NOTE — TELEPHONE ENCOUNTER
Left message on both  and wife's phone to review download data and try to help with myair sondra.

## 2024-01-17 ENCOUNTER — TELEPHONE (OUTPATIENT)
Age: 74
End: 2024-01-17

## 2024-01-24 ASSESSMENT — SLEEP AND FATIGUE QUESTIONNAIRES
HAS YOUR MOOD BEEN AFFECTED BECAUSE YOU ARE SLEEPY OR TIRED: YES, LITTLE
HOW LIKELY ARE YOU TO NOD OFF OR FALL ASLEEP WHILE SITTING INACTIVE IN A PUBLIC PLACE: SLIGHT CHANCE OF DOZING
HOW LIKELY ARE YOU TO NOD OFF OR FALL ASLEEP WHILE SITTING INACTIVE IN A PUBLIC PLACE: 1
HOW LIKELY ARE YOU TO NOD OFF OR FALL ASLEEP WHILE SITTING QUIETLY AFTER LUNCH WITHOUT ALCOHOL: 1
DO YOU HAVE DIFFICULTY OPERATING A MOTOR VEHICLE FOR SHORT DISTANCES (LESS THAN 100 MILES) BECAUSE YOU BECOME SLEEPY: NO
HOW LIKELY ARE YOU TO NOD OFF OR FALL ASLEEP WHILE LYING DOWN TO REST IN THE AFTERNOON WHEN CIRCUMSTANCES PERMIT: 1
DO YOU HAVE DIFFICULTY VISITING YOUR FAMILY OR FRIENDS IN THEIR HOME BECAUSE YOU BECOME SLEEPY OR TIRED: NO
HOW LIKELY ARE YOU TO NOD OFF OR FALL ASLEEP IN A CAR, WHILE STOPPED FOR A FEW MINUTES IN TRAFFIC: WOULD NEVER DOZE
HOW LIKELY ARE YOU TO NOD OFF OR FALL ASLEEP WHILE LYING DOWN TO REST IN THE AFTERNOON WHEN CIRCUMSTANCES PERMIT: SLIGHT CHANCE OF DOZING
HOW LIKELY ARE YOU TO NOD OFF OR FALL ASLEEP WHILE WATCHING TV: SLIGHT CHANCE OF DOZING
HOW LIKELY ARE YOU TO NOD OFF OR FALL ASLEEP IN A CAR, WHILE STOPPED FOR A FEW MINUTES IN TRAFFIC: 0
HOW LIKELY ARE YOU TO NOD OFF OR FALL ASLEEP WHILE SITTING AND TALKING TO SOMEONE: WOULD NEVER DOZE
DO YOU GENERALLY HAVE DIFFICULTY REMEMBERING THINGS BECAUSE YOU ARE SLEEPY OR TIRED: YES, A LITTLE
DO YOU HAVE DIFFICULTY WATCHING A MOVIE OR VIDEO BECAUSE YOU BECOME SLEEPY OR TIRED: YES, A LITTLE
DO YOU HAVE DIFFICULTY OPERATING A MOTOR VEHICLE FOR LONG DISTANCES (GREATER THAN 100 MILES) BECAUSE YOU BECOME SLEEPY: YES, A LITTLE
ESS TOTAL SCORE: 6
HOW LIKELY ARE YOU TO NOD OFF OR FALL ASLEEP WHEN YOU ARE A PASSENGER IN A CAR FOR AN HOUR WITHOUT A BREAK: SLIGHT CHANCE OF DOZING
HOW LIKELY ARE YOU TO NOD OFF OR FALL ASLEEP WHILE SITTING AND READING: 1
DO YOU HAVE DIFFICULTY CONCENTRATING ON THE THINGS YOU DO BECAUSE YOU ARE SLEEPY OR TIRED: YES, A LITTLE
HAS YOUR RELATIONSHIP WITH FAMILY, FRIENDS OR WORK COLLEAGUES BEEN AFFECTED BECAUSE YOU ARE SLEEPY OR TIRED: YES, A LITTLE
HOW LIKELY ARE YOU TO NOD OFF OR FALL ASLEEP WHILE SITTING AND TALKING TO SOMEONE: 0
HOW LIKELY ARE YOU TO NOD OFF OR FALL ASLEEP WHILE SITTING AND READING: SLIGHT CHANCE OF DOZING
HOW LIKELY ARE YOU TO NOD OFF OR FALL ASLEEP WHEN YOU ARE A PASSENGER IN A CAR FOR AN HOUR WITHOUT A BREAK: 1
FOSQ SCORE: 16
DO YOU HAVE DIFFICULTY BEING AS ACTIVE AS YOU WANT TO BE IN THE EVENING BECAUSE YOU ARE SLEEPY OR TIRED: YES, LITTLE
DO YOU HAVE DIFFICULTY BEING AS ACTIVE AS YOU WANT TO BE IN THE MORNING BECAUSE YOU ARE SLEEPY OR TIRED: YES, LITTLE
HOW LIKELY ARE YOU TO NOD OFF OR FALL ASLEEP WHILE SITTING QUIETLY AFTER LUNCH WITHOUT ALCOHOL: SLIGHT CHANCE OF DOZING
HOW LIKELY ARE YOU TO NOD OFF OR FALL ASLEEP WHILE WATCHING TV: 1

## 2024-01-25 ENCOUNTER — CLINICAL DOCUMENTATION (OUTPATIENT)
Age: 74
End: 2024-01-25

## 2024-01-25 ENCOUNTER — OFFICE VISIT (OUTPATIENT)
Age: 74
End: 2024-01-25
Payer: MEDICARE

## 2024-01-25 VITALS
HEIGHT: 74 IN | WEIGHT: 200 LBS | BODY MASS INDEX: 25.67 KG/M2 | HEART RATE: 68 BPM | SYSTOLIC BLOOD PRESSURE: 144 MMHG | DIASTOLIC BLOOD PRESSURE: 56 MMHG | OXYGEN SATURATION: 98 % | TEMPERATURE: 98.3 F

## 2024-01-25 DIAGNOSIS — F31.9 BIPOLAR AFFECTIVE DISORDER, REMISSION STATUS UNSPECIFIED (HCC): ICD-10-CM

## 2024-01-25 DIAGNOSIS — G47.33 OSA (OBSTRUCTIVE SLEEP APNEA): Primary | ICD-10-CM

## 2024-01-25 PROCEDURE — G8419 CALC BMI OUT NRM PARAM NOF/U: HCPCS | Performed by: NURSE PRACTITIONER

## 2024-01-25 PROCEDURE — G8427 DOCREV CUR MEDS BY ELIG CLIN: HCPCS | Performed by: NURSE PRACTITIONER

## 2024-01-25 PROCEDURE — G8484 FLU IMMUNIZE NO ADMIN: HCPCS | Performed by: NURSE PRACTITIONER

## 2024-01-25 PROCEDURE — 99214 OFFICE O/P EST MOD 30 MIN: CPT | Performed by: NURSE PRACTITIONER

## 2024-01-25 PROCEDURE — 3078F DIAST BP <80 MM HG: CPT | Performed by: NURSE PRACTITIONER

## 2024-01-25 PROCEDURE — 1036F TOBACCO NON-USER: CPT | Performed by: NURSE PRACTITIONER

## 2024-01-25 PROCEDURE — 3017F COLORECTAL CA SCREEN DOC REV: CPT | Performed by: NURSE PRACTITIONER

## 2024-01-25 PROCEDURE — 3077F SYST BP >= 140 MM HG: CPT | Performed by: NURSE PRACTITIONER

## 2024-01-25 PROCEDURE — 1123F ACP DISCUSS/DSCN MKR DOCD: CPT | Performed by: NURSE PRACTITIONER

## 2024-01-25 NOTE — PATIENT INSTRUCTIONS
[address]  [place] [address]  Tel.  [phone]  Fax. [phone] [address]  [place] [address]  Tel.  [phone]  Fax. [phone] [address] [place] [address]  Tel.  [phone]  Fax. [phone]     Learning About CPAP for Sleep Apnea  What is CPAP?              CPAP is a small machine that you use at home every night while you sleep. It increases air pressure in your throat to keep your airway open. When you have sleep apnea, this can help you sleep better so you feel much better. CPAP stands for \"continuous positive airway pressure.\"  The CPAP machine will have one of the following:  A mask that covers your nose and mouth  Prongs that fit into your nose  A mask that covers your nose only, the most common type. This type is called NCPAP. The N stands for \"nasal.\"  Why is it done?  CPAP is usually the best treatment for obstructive sleep apnea. It is the first treatment choice and the most widely used. Your doctor may suggest CPAP if you have:  Moderate to severe sleep apnea.  Sleep apnea and coronary artery disease (CAD) or heart failure.  How does it help?  CPAP can help you have more normal sleep, so you feel less sleepy and more alert during the daytime.  CPAP may help keep heart failure or other heart problems from getting worse.  NCPAP may help lower your blood pressure.  If you use CPAP, your bed partner may also sleep better because you are not snoring or restless.  What are the side effects?  Some people who use CPAP have:  A dry or stuffy nose and a sore throat.  Irritated skin on the face.  Sore eyes.  Bloating.  If you have any of these problems, work with your doctor to fix them. Here are some things you can try:  Be sure the mask or nasal prongs fit well.  See if your doctor can adjust the pressure of your CPAP.  If your nose is dry, try a humidifier.  If your nose is runny or stuffy, try decongestant medicine or a steroid

## 2024-01-25 NOTE — PROGRESS NOTES
His wife reports that he is not snoring and the device is very quiet at night, they are now able to sleep in the same room again.     Review of device download indicated:  Auto pressure: 4-6 cmH2O; Max Pressure: 5.9 cmH2O;  95th percentile Pressure: 5.8 cmH2O   95th Percentile Leak: 51.9 L/Min     CMS Compliance % Used Days >= 4 hours: 87.    Average daily use of 8:24 hours.     Therapy Apnea Index averaged over PAP use: 5.7 /hr which reflects improved sleep breathing condition.    Watson Sleepiness Score: 6 and Modified F.O.S.Q. Score Total / 2: 16  which reflects improved sleep quality over therapy time.    CO2 24 mmol/L (range 20-29) on labs 12/20/2023    Sleep Review of Systems: notable for Negative difficulty falling asleep; Negative awakenings at night; Negative early morning headaches; Negative memory problems; Negative concentration issues; Negative chest pain; Negative shortness of breath; Negative significant joint pain at night; Negative significant muscle pain at night; Negative rashes or itching; Negative heartburn; Negative significant mood issues; occasional brief dozing in recliner       BP (!) 144/56 (Site: Left Upper Arm, Position: Sitting, Cuff Size: Large Adult)   Pulse 68   Temp 98.3 °F (36.8 °C) (Temporal)   Ht 1.88 m (6' 2\")   Wt 90.7 kg (200 lb)   SpO2 98%   BMI 25.68 kg/m²        General:   Alert, oriented, not in acute distress   Eyes:  Anicteric Sclerae; no obvious strabismus   Nose:  No obvious nasal septum deviation    Neck:   Midline trachea   Chest/Lungs:  Symmetrical lung expansion, clear lung fields on auscultation    CVS:  Normal rate, regular rhythm,  no JVD   Extremities:  No obvious rashes, no edema    Neuro:  No focal deficits; No obvious tremor    Psych:  Normal affect,  normal countenance     Patient's phone number [phone] (cell) was reviewed and confirmed for accuracy.  He gives permission for messages regarding results and appointments to be left at that

## 2024-07-24 ASSESSMENT — SLEEP AND FATIGUE QUESTIONNAIRES
DO YOU HAVE DIFFICULTY CONCENTRATING ON THE THINGS YOU DO BECAUSE YOU ARE SLEEPY OR TIRED: YES, A LITTLE
FOSQ SCORE: 16
HOW LIKELY ARE YOU TO NOD OFF OR FALL ASLEEP WHILE WATCHING TV: SLIGHT CHANCE OF DOZING
DO YOU HAVE DIFFICULTY VISITING YOUR FAMILY OR FRIENDS IN THEIR HOME BECAUSE YOU BECOME SLEEPY OR TIRED: YES, A LITTLE
HOW LIKELY ARE YOU TO NOD OFF OR FALL ASLEEP WHILE SITTING AND READING: SLIGHT CHANCE OF DOZING
HOW LIKELY ARE YOU TO NOD OFF OR FALL ASLEEP WHILE SITTING INACTIVE IN A PUBLIC PLACE: SLIGHT CHANCE OF DOZING
HOW LIKELY ARE YOU TO NOD OFF OR FALL ASLEEP IN A CAR, WHILE STOPPED FOR A FEW MINUTES IN TRAFFIC: WOULD NEVER DOZE
HOW LIKELY ARE YOU TO NOD OFF OR FALL ASLEEP WHEN YOU ARE A PASSENGER IN A CAR FOR AN HOUR WITHOUT A BREAK: WOULD NEVER DOZE
HAS YOUR MOOD BEEN AFFECTED BECAUSE YOU ARE SLEEPY OR TIRED: NO
HAS YOUR RELATIONSHIP WITH FAMILY, FRIENDS OR WORK COLLEAGUES BEEN AFFECTED BECAUSE YOU ARE SLEEPY OR TIRED: YES, A LITTLE
DO YOU HAVE DIFFICULTY OPERATING A MOTOR VEHICLE FOR SHORT DISTANCES (LESS THAN 100 MILES) BECAUSE YOU BECOME SLEEPY: NO
DO YOU HAVE DIFFICULTY WATCHING A MOVIE OR VIDEO BECAUSE YOU BECOME SLEEPY OR TIRED: YES, A LITTLE
DO YOU HAVE DIFFICULTY OPERATING A MOTOR VEHICLE FOR LONG DISTANCES (GREATER THAN 100 MILES) BECAUSE YOU BECOME SLEEPY: YES, A LITTLE
HOW LIKELY ARE YOU TO NOD OFF OR FALL ASLEEP WHILE SITTING QUIETLY AFTER LUNCH WITHOUT ALCOHOL: SLIGHT CHANCE OF DOZING
DO YOU HAVE DIFFICULTY BEING AS ACTIVE AS YOU WANT TO BE IN THE MORNING BECAUSE YOU ARE SLEEPY OR TIRED: YES, LITTLE
HOW LIKELY ARE YOU TO NOD OFF OR FALL ASLEEP WHILE SITTING AND TALKING TO SOMEONE: WOULD NEVER DOZE
DO YOU GENERALLY HAVE DIFFICULTY REMEMBERING THINGS BECAUSE YOU ARE SLEEPY OR TIRED: YES, A LITTLE
HOW LIKELY ARE YOU TO NOD OFF OR FALL ASLEEP WHILE LYING DOWN TO REST IN THE AFTERNOON WHEN CIRCUMSTANCES PERMIT: SLIGHT CHANCE OF DOZING
DO YOU HAVE DIFFICULTY BEING AS ACTIVE AS YOU WANT TO BE IN THE EVENING BECAUSE YOU ARE SLEEPY OR TIRED: YES, LITTLE

## 2024-07-25 ENCOUNTER — OFFICE VISIT (OUTPATIENT)
Age: 74
End: 2024-07-25
Payer: MEDICARE

## 2024-07-25 VITALS
HEART RATE: 59 BPM | DIASTOLIC BLOOD PRESSURE: 57 MMHG | OXYGEN SATURATION: 97 % | SYSTOLIC BLOOD PRESSURE: 110 MMHG | TEMPERATURE: 98.7 F | BODY MASS INDEX: 25.15 KG/M2 | WEIGHT: 196 LBS | HEIGHT: 74 IN

## 2024-07-25 DIAGNOSIS — F31.9 BIPOLAR AFFECTIVE DISORDER, REMISSION STATUS UNSPECIFIED (HCC): ICD-10-CM

## 2024-07-25 DIAGNOSIS — G47.33 OSA (OBSTRUCTIVE SLEEP APNEA): Primary | ICD-10-CM

## 2024-07-25 PROCEDURE — 1036F TOBACCO NON-USER: CPT | Performed by: NURSE PRACTITIONER

## 2024-07-25 PROCEDURE — 3017F COLORECTAL CA SCREEN DOC REV: CPT | Performed by: NURSE PRACTITIONER

## 2024-07-25 PROCEDURE — G8427 DOCREV CUR MEDS BY ELIG CLIN: HCPCS | Performed by: NURSE PRACTITIONER

## 2024-07-25 PROCEDURE — 3078F DIAST BP <80 MM HG: CPT | Performed by: NURSE PRACTITIONER

## 2024-07-25 PROCEDURE — 1123F ACP DISCUSS/DSCN MKR DOCD: CPT | Performed by: NURSE PRACTITIONER

## 2024-07-25 PROCEDURE — 3074F SYST BP LT 130 MM HG: CPT | Performed by: NURSE PRACTITIONER

## 2024-07-25 PROCEDURE — G8419 CALC BMI OUT NRM PARAM NOF/U: HCPCS | Performed by: NURSE PRACTITIONER

## 2024-07-25 PROCEDURE — 99213 OFFICE O/P EST LOW 20 MIN: CPT | Performed by: NURSE PRACTITIONER

## 2024-07-25 NOTE — PROGRESS NOTES
Procedures    DME Order for (Specify) as OP     Diagnosis: (G47.33) TREVOR (obstructive sleep apnea)  (primary encounter diagnosis)     Replacement Supplies for Positive Airway Pressure Therapy Device:   Duration of need: 99 months.        Nasal Cushion (Replace) 2 per month.   Nasal Interface Mask 1 every 3 months.   Pos Airway pressure chin strap   Headgear 1 every 6 months.     Tubing with heating element 1 every 3 months.     Filter(s) Disposable 2 per month.   Filter(s) Non-Disposable 1 every 6 months.   .    Water Chamber for Humidifier (Replace) 1 every 6 months.      Ayanna Gray, [facility]; NPI: [id number]    Electronically signed. Date:- 07/25/24     * Counseling was provided regarding the importance of regular PAP use with emphasis on ensuring sufficient total sleep time, proper sleep hygiene, and safe driving.    * Re-enforced proper and regular cleaning for the device.    * He was asked to contact our office for any problems regarding PAP therapy.    2. H/O BiPolar -  continue on his current regimen.      3. Encouraged continued weight management program through appropriate diet and exercise regimen as significant weight reduction has been shown to reduce severity of obstructive sleep apnea.     SUBJECTIVE/OBJECTIVE:    He  is seen today for follow up on PAP device and reports some problems using the device.   The following concerns identified:    Drowsiness no Problems exhaling no   Snoring no Forget to put on no   Mask Comfortable yes Can't fall asleep no   Dry Mouth no Mask falls off no   Air Leaking no Frequent awakenings no       He reports that his sleep had improved on PAP therapy using nasal mask and heated tubing but lately he has not been feeling as refreshed.  They have recently moved to a new home, [facility].  At prior visit AHI was 5.7 with airleak 51.9 L/min.  Pressure was cahnged to APAP 7-9 cm H2O.    Detail download review shows central

## 2024-07-26 ENCOUNTER — CLINICAL DOCUMENTATION (OUTPATIENT)
Age: 74
End: 2024-07-26

## 2024-09-03 ENCOUNTER — CLINICAL DOCUMENTATION (OUTPATIENT)
Age: 74
End: 2024-09-03

## 2024-09-03 NOTE — PROGRESS NOTES
Patient reports that he is having issues with PAP device.    Tech to please download device data and call patient to determine concerns.      Patient has follow up scheduled with Dr. Carrera on 1/28/2025.  Can be scheduled sooner if needed.    GLENYS Armstrong NP, [facility]  09/03/24

## 2024-09-04 ENCOUNTER — TELEPHONE (OUTPATIENT)
Age: 74
End: 2024-09-04

## 2024-09-16 PROBLEM — G21.11 NEUROLEPTIC INDUCED PARKINSONISM (HCC): Status: ACTIVE | Noted: 2024-09-16

## 2024-09-16 PROBLEM — T43.505A NEUROLEPTIC INDUCED PARKINSONISM (HCC): Status: ACTIVE | Noted: 2024-09-16

## 2024-09-24 ENCOUNTER — TELEPHONE (OUTPATIENT)
Age: 74
End: 2024-09-24

## 2024-10-10 ENCOUNTER — PROCEDURE VISIT (OUTPATIENT)
Age: 74
End: 2024-10-10

## 2024-10-10 DIAGNOSIS — G47.33 OSA (OBSTRUCTIVE SLEEP APNEA): Primary | ICD-10-CM

## 2024-10-10 NOTE — PROGRESS NOTES
Patient seen in the lab today for a device check/mask check.  Device working properly and recent download in chart.  Patient had several replacement supplies.  Replacement supplies labeled and put in individual bags.  Equipment care and supply change was addressed.  Patient and spouse expressed understanding.

## 2024-11-20 ENCOUNTER — OFFICE VISIT (OUTPATIENT)
Age: 74
End: 2024-11-20
Payer: MEDICARE

## 2024-11-20 VITALS
DIASTOLIC BLOOD PRESSURE: 72 MMHG | BODY MASS INDEX: 23.87 KG/M2 | HEIGHT: 75 IN | WEIGHT: 192 LBS | SYSTOLIC BLOOD PRESSURE: 140 MMHG | HEART RATE: 61 BPM | OXYGEN SATURATION: 98 %

## 2024-11-20 DIAGNOSIS — R41.3 FUNCTIONAL MEMORY PROBLEM: Primary | ICD-10-CM

## 2024-11-20 DIAGNOSIS — G21.11 NEUROLEPTIC INDUCED PARKINSONISM (CODE) (HCC): ICD-10-CM

## 2024-11-20 DIAGNOSIS — F31.9 BIPOLAR AFFECTIVE DISORDER, REMISSION STATUS UNSPECIFIED (HCC): ICD-10-CM

## 2024-11-20 PROCEDURE — G8484 FLU IMMUNIZE NO ADMIN: HCPCS | Performed by: PSYCHIATRY & NEUROLOGY

## 2024-11-20 PROCEDURE — 1125F AMNT PAIN NOTED PAIN PRSNT: CPT | Performed by: PSYCHIATRY & NEUROLOGY

## 2024-11-20 PROCEDURE — G8420 CALC BMI NORM PARAMETERS: HCPCS | Performed by: PSYCHIATRY & NEUROLOGY

## 2024-11-20 PROCEDURE — G8427 DOCREV CUR MEDS BY ELIG CLIN: HCPCS | Performed by: PSYCHIATRY & NEUROLOGY

## 2024-11-20 PROCEDURE — 1036F TOBACCO NON-USER: CPT | Performed by: PSYCHIATRY & NEUROLOGY

## 2024-11-20 PROCEDURE — 1159F MED LIST DOCD IN RCRD: CPT | Performed by: PSYCHIATRY & NEUROLOGY

## 2024-11-20 PROCEDURE — 3017F COLORECTAL CA SCREEN DOC REV: CPT | Performed by: PSYCHIATRY & NEUROLOGY

## 2024-11-20 PROCEDURE — 96138 PSYCL/NRPSYC TECH 1ST: CPT | Performed by: PSYCHIATRY & NEUROLOGY

## 2024-11-20 PROCEDURE — 99214 OFFICE O/P EST MOD 30 MIN: CPT | Performed by: PSYCHIATRY & NEUROLOGY

## 2024-11-20 PROCEDURE — 1123F ACP DISCUSS/DSCN MKR DOCD: CPT | Performed by: PSYCHIATRY & NEUROLOGY

## 2024-11-20 PROCEDURE — 3078F DIAST BP <80 MM HG: CPT | Performed by: PSYCHIATRY & NEUROLOGY

## 2024-11-20 PROCEDURE — 96132 NRPSYC TST EVAL PHYS/QHP 1ST: CPT | Performed by: PSYCHIATRY & NEUROLOGY

## 2024-11-20 PROCEDURE — 3077F SYST BP >= 140 MM HG: CPT | Performed by: PSYCHIATRY & NEUROLOGY

## 2024-11-20 NOTE — PROGRESS NOTES
32597 (16 minute minimum)  _16_ minutes were spent administering cognitive testing by medical assistant/nurse.   81184 (31 minute minimum)   _31__ minutes were spent reviewing and interpreting the cognitive testing results, integrating patient data, and discussing the results with the patient.        Cognitive Testing Evaluation     Introduction:     Andrea Chacon   1950  Male   This 74 year old Male was administered a battery of neurocognitive testing on 11/20/2024.     Tests Administered:     Trails A, Trails B, Digit Symbol Substitution, Stroop, Immediate Recognition, Delayed Recognition   The combined test administration time was 16 minutes   Test Results:   Cognitive testing was provided via a battery of cognitive assessments. The pattern of test scores indicate that results are valid.  A Clinical Report with further description of scores and results is also available.   Overall: Patient tested in the 5th* percentile (standard score of 76*).   Trails A: Patient tested in the 3rd percentile (scaled standard score of 71).  Trails B: Patient tested in the --* percentile (scaled standard score of null).  Digit Symbol Substitution: Patient tested in the 1st percentile (scaled standard score of 65).  Stroop: Patient tested in the 16th percentile (scaled standard score of 85).  Immediate Recognition: Patient tested in the 57th percentile (scaled standard score of 103).  Delayed Recognition: Patient tested in the 88th percentile (scaled standard score of 118).   * These assessments were not scored because they were potentially invalid, or the patient failed to complete in the allotted time.   Interpretation of Test Scores:     Examination of individual component tests shows:   Attention - Trails A: possible impairment  Mental Flexibility - Trails B: possible impairment  Executive Function - Digit Symbol Substitution: likely impairment  Executive Function - Stroop: possible impairment  Memory - Immediate 
I would like to see him back in clinic after testing is completed.   Plan:   Repeat neuropsychologic testing   F/U Psychiatry for bipolar disorder/anxiety    Return in about 6 months (around 5/20/2025).      I have discussed the diagnosis with the patient today and the intended plan as seen in the above orders with both the patient as well as referring provider and/or PCP via electronic correspondence. The patient has received an after-visit summary and questions were answered concerning future plans. I have discussed medication side effects and warnings with the patient as well.      Signed:  Rea Baugh,   11/20/24    16566 (16 minute minimum)  _16_ minutes were spent administering cognitive testing by medical assistant/nurse.   96166 (31 minute minimum)   _31__ minutes were spent reviewing and interpreting the cognitive testing results, integrating patient data, and discussing the results with the patient.

## 2024-12-03 PROBLEM — M17.11 ARTHRITIS OF RIGHT KNEE: Status: ACTIVE | Noted: 2024-12-03

## 2025-01-08 ENCOUNTER — HOSPITAL ENCOUNTER (OUTPATIENT)
Facility: HOSPITAL | Age: 75
Discharge: HOME | End: 2025-01-11
Payer: MEDICARE

## 2025-01-08 VITALS
BODY MASS INDEX: 24.95 KG/M2 | RESPIRATION RATE: 18 BRPM | DIASTOLIC BLOOD PRESSURE: 62 MMHG | HEART RATE: 52 BPM | TEMPERATURE: 97.3 F | SYSTOLIC BLOOD PRESSURE: 154 MMHG | HEIGHT: 74 IN | WEIGHT: 194.4 LBS

## 2025-01-08 LAB
ABO + RH BLD: NORMAL
ANION GAP SERPL CALC-SCNC: 4 MMOL/L (ref 2–12)
APPEARANCE UR: CLEAR
BACTERIA URNS QL MICRO: NEGATIVE /HPF
BILIRUB UR QL: NEGATIVE
BLOOD GROUP ANTIBODIES SERPL: NORMAL
BUN SERPL-MCNC: 22 MG/DL (ref 6–20)
BUN/CREAT SERPL: 13 (ref 12–20)
CALCIUM SERPL-MCNC: 10.2 MG/DL (ref 8.5–10.1)
CHLORIDE SERPL-SCNC: 109 MMOL/L (ref 97–108)
CO2 SERPL-SCNC: 28 MMOL/L (ref 21–32)
COLOR UR: NORMAL
CREAT SERPL-MCNC: 1.74 MG/DL (ref 0.7–1.3)
EKG ATRIAL RATE: 54 BPM
EKG DIAGNOSIS: NORMAL
EKG P AXIS: 76 DEGREES
EKG P-R INTERVAL: 284 MS
EKG Q-T INTERVAL: 430 MS
EKG QRS DURATION: 100 MS
EKG QTC CALCULATION (BAZETT): 407 MS
EKG R AXIS: -19 DEGREES
EKG T AXIS: 42 DEGREES
EKG VENTRICULAR RATE: 54 BPM
EPITH CASTS URNS QL MICRO: NORMAL /LPF
ERYTHROCYTE [DISTWIDTH] IN BLOOD BY AUTOMATED COUNT: 12.4 % (ref 11.5–14.5)
EST. AVERAGE GLUCOSE BLD GHB EST-MCNC: 105 MG/DL
GLUCOSE SERPL-MCNC: 103 MG/DL (ref 65–100)
GLUCOSE UR STRIP.AUTO-MCNC: NEGATIVE MG/DL
HBA1C MFR BLD: 5.3 % (ref 4–5.6)
HCT VFR BLD AUTO: 44.2 % (ref 36.6–50.3)
HGB BLD-MCNC: 14.5 G/DL (ref 12.1–17)
HGB UR QL STRIP: NEGATIVE
HYALINE CASTS URNS QL MICRO: NORMAL /LPF (ref 0–5)
INR PPP: 1 (ref 0.9–1.1)
KETONES UR QL STRIP.AUTO: NEGATIVE MG/DL
LEUKOCYTE ESTERASE UR QL STRIP.AUTO: NEGATIVE
MCH RBC QN AUTO: 31.9 PG (ref 26–34)
MCHC RBC AUTO-ENTMCNC: 32.8 G/DL (ref 30–36.5)
MCV RBC AUTO: 97.4 FL (ref 80–99)
NITRITE UR QL STRIP.AUTO: NEGATIVE
NRBC # BLD: 0 K/UL (ref 0–0.01)
NRBC BLD-RTO: 0 PER 100 WBC
PH UR STRIP: 6 (ref 5–8)
PLATELET # BLD AUTO: 252 K/UL (ref 150–400)
PMV BLD AUTO: 10.7 FL (ref 8.9–12.9)
POTASSIUM SERPL-SCNC: 4.4 MMOL/L (ref 3.5–5.1)
PROT UR STRIP-MCNC: NEGATIVE MG/DL
PROTHROMBIN TIME: 10.4 SEC (ref 9–11.1)
RBC # BLD AUTO: 4.54 M/UL (ref 4.1–5.7)
RBC #/AREA URNS HPF: NORMAL /HPF (ref 0–5)
SODIUM SERPL-SCNC: 141 MMOL/L (ref 136–145)
SP GR UR REFRACTOMETRY: 1.01 (ref 1–1.03)
SPECIMEN EXP DATE BLD: NORMAL
URINE CULTURE IF INDICATED: NORMAL
UROBILINOGEN UR QL STRIP.AUTO: 0.2 EU/DL (ref 0.2–1)
WBC # BLD AUTO: 6.7 K/UL (ref 4.1–11.1)
WBC URNS QL MICRO: NORMAL /HPF (ref 0–4)

## 2025-01-08 PROCEDURE — 86900 BLOOD TYPING SEROLOGIC ABO: CPT

## 2025-01-08 PROCEDURE — 85027 COMPLETE CBC AUTOMATED: CPT

## 2025-01-08 PROCEDURE — 83036 HEMOGLOBIN GLYCOSYLATED A1C: CPT

## 2025-01-08 PROCEDURE — 36415 COLL VENOUS BLD VENIPUNCTURE: CPT

## 2025-01-08 PROCEDURE — 81001 URINALYSIS AUTO W/SCOPE: CPT

## 2025-01-08 PROCEDURE — 85610 PROTHROMBIN TIME: CPT

## 2025-01-08 PROCEDURE — 93005 ELECTROCARDIOGRAM TRACING: CPT | Performed by: ORTHOPAEDIC SURGERY

## 2025-01-08 PROCEDURE — 93010 ELECTROCARDIOGRAM REPORT: CPT | Performed by: INTERNAL MEDICINE

## 2025-01-08 PROCEDURE — 86901 BLOOD TYPING SEROLOGIC RH(D): CPT

## 2025-01-08 PROCEDURE — APPNB30 APP NON BILLABLE TIME 0-30 MINS: Performed by: NURSE PRACTITIONER

## 2025-01-08 PROCEDURE — 80048 BASIC METABOLIC PNL TOTAL CA: CPT

## 2025-01-08 PROCEDURE — 86850 RBC ANTIBODY SCREEN: CPT

## 2025-01-08 ASSESSMENT — PROMIS GLOBAL HEALTH SCALE
IN THE PAST 7 DAYS, HOW OFTEN HAVE YOU BEEN BOTHERED BY EMOTIONAL PROBLEMS, SUCH AS FEELING ANXIOUS, DEPRESSED, OR IRRITABLE [ON A SCALE FROM 1 (NEVER) TO 5 (ALWAYS)]?: SOMETIMES
HOW IS THE PROMIS V1.1 BEING ADMINISTERED?: PAPER
IN GENERAL, HOW WOULD YOU RATE YOUR MENTAL HEALTH, INCLUDING YOUR MOOD AND YOUR ABILITY TO THINK [ON A SCALE OF 1 (POOR) TO 5 (EXCELLENT)]?: FAIR
IN GENERAL, WOULD YOU SAY YOUR QUALITY OF LIFE IS...[ON A SCALE OF 1 (POOR) TO 5 (EXCELLENT)]: GOOD
SUM OF RESPONSES TO QUESTIONS 2, 4, 5, & 10: 11
IN THE PAST 7 DAYS, HOW WOULD YOU RATE YOUR PAIN ON AVERAGE [ON A SCALE FROM 0 (NO PAIN) TO 10 (WORST IMAGINABLE PAIN)]?: 7
IN GENERAL, WOULD YOU SAY YOUR HEALTH IS...[ON A SCALE OF 1 (POOR) TO 5 (EXCELLENT)]: GOOD
SUM OF RESPONSES TO QUESTIONS 3, 6, 7, & 8: 14
IN THE PAST 7 DAYS, HOW WOULD YOU RATE YOUR FATIGUE ON AVERAGE [ON A SCALE FROM 1 (NONE) TO 5 (VERY SEVERE)]?: SEVERE
IN GENERAL, HOW WOULD YOU RATE YOUR SATISFACTION WITH YOUR SOCIAL ACTIVITIES AND RELATIONSHIPS [ON A SCALE OF 1 (POOR) TO 5 (EXCELLENT)]?: GOOD
IN GENERAL, PLEASE RATE HOW WELL YOU CARRY OUT YOUR USUAL SOCIAL ACTIVITIES (INCLUDES ACTIVITIES AT HOME, AT WORK, AND IN YOUR COMMUNITY, AND RESPONSIBILITIES AS A PARENT, CHILD, SPOUSE, EMPLOYEE, FRIEND, ETC) [ON A SCALE OF 1 (POOR) TO 5 (EXCELLENT)]?: GOOD
IN GENERAL, HOW WOULD YOU RATE YOUR PHYSICAL HEALTH [ON A SCALE OF 1 (POOR) TO 5 (EXCELLENT)]?: GOOD
TO WHAT EXTENT ARE YOU ABLE TO CARRY OUT YOUR EVERYDAY PHYSICAL ACTIVITIES SUCH AS WALKING, CLIMBING STAIRS, CARRYING GROCERIES, OR MOVING A CHAIR [ON A SCALE OF 1 (NOT AT ALL) TO 5 (COMPLETELY)]?: A LITTLE

## 2025-01-08 ASSESSMENT — KOOS JR
GOING UP OR DOWN STAIRS: MODERATE
STRAIGHTENING KNEE FULLY: MODERATE
HOW SEVERE IS YOUR KNEE STIFFNESS AFTER FIRST WAKING IN MORNING: MODERATE
RISING FROM SITTING: MODERATE
KOOS JR TOTAL INTERVAL SCORE: 50.012
STANDING UPRIGHT: MODERATE
TWISING OR PIVOTING ON KNEE: MODERATE
BENDING TO THE FLOOR TO PICK UP OBJECT: SEVERE

## 2025-01-08 ASSESSMENT — PAIN DESCRIPTION - LOCATION: LOCATION: KNEE

## 2025-01-08 ASSESSMENT — PAIN DESCRIPTION - ORIENTATION: ORIENTATION: RIGHT

## 2025-01-08 ASSESSMENT — PAIN - FUNCTIONAL ASSESSMENT: PAIN_FUNCTIONAL_ASSESSMENT: PREVENTS OR INTERFERES SOME ACTIVE ACTIVITIES AND ADLS

## 2025-01-08 ASSESSMENT — PAIN SCALES - GENERAL: PAINLEVEL_OUTOF10: 7

## 2025-01-08 ASSESSMENT — PAIN DESCRIPTION - DESCRIPTORS: DESCRIPTORS: SORE

## 2025-01-08 NOTE — PERIOP NOTE
[address] [address]   MAIN OR                     [phone]    MAIN PRE OP             [phone]                                                                                AMBULATORY PRE OP          [phone]  PRE-ADMISSION TESTING    [phone]     Surgery Date:  01/15/2025       Is surgery arrival time given by surgeon?  YES  NO    If “NO”, [facility] staff will call you between 4 and 7pm the day before your surgery with your arrival time. (If your surgery is on a Monday, we will call you the Friday before.)    Call [phone] after 7pm Monday-Friday if you did not receive this call.    INSTRUCTIONS BEFORE YOUR SURGERY   When You  Arrive Arrive at [facility] Patient Access on 1st floor the day of your surgery.    Have your insurance card, photo ID,living will/advanced directive/POA (if applicable),  and any copayment (if needed)     Food   and   Drink NO solid food after midnight the night before surgery. You can drink clear liquids from midnight until ONE hour prior to your arrival at the hospital on the day of your surgery.     Clear liquids include:  Water  CLEAR APPLE JUICE WITHOUT SEDIMENT (NO ORANGE JUICE)  Carbonated beverages  Black coffee(no cream/milk)  Tea(no cream/milk)  Gatorade    No alcohol (beer, wine, liquor) or marijuana (smoking) 24 hours, edibles (3 days). Stop smoking cigarettes 14 days before surgery (helps w/healing and breathing).     Medications to   TAKE   Morning of Surgery MEDICATIONS TO TAKE THE MORNING OF SURGERY WITH A SIP OF WATER: AMLODIPINE, ATORVASTATIN, LAMTRIGINE, LEVOTHYROXINE, LITHIUM,  REFRESH     You may take these medications, IF NEEDED, the morning of surgery: TYLENOL: LAST DOSE IS TO BE 4 HOURS PRIOR TO ARRIVAL TIME,   COLCHICINE,  AYR    Ask your surgeon/prescribing doctor for instructions on taking or stopping these medications prior to surgery: NONE     Medications to STOP  before

## 2025-01-08 NOTE — PERIOP NOTE
INSTRUCTIONS GIVEN AND REVIEWED, 2 BOTTLES OF SOAP GIVEN, PT GIVEN TIME TO ASK QUESTIONS     EKG DONE

## 2025-01-09 NOTE — PERIOP NOTE
PAT Nurse Practitioner   Pre-Operative Chart Review/Assessment:-ORTHOPEDIC                Patient Name:  Andrea Chacon                                                           Age:   74 y.o.    :  1950     Today's Date:  1/10/2025     Date of PAT:   25      Date of Surgery:    1/15/25      Procedure(s):  Right Total Knee Arthroplasty     Anesthesia:   Spinal     Surgeon:   Dr. Glynn                       PLAN:      1)  PCP:  Kings Bundy IV, MD      2)  Cardiac Clearance:  EKG and METs reviewed. No further cardiac evaluation requested. PAT EKG showed sinus tess; HR-54 and 1st degree AVB.         3)  Diabetic Treatment Consult:  Not indicated. A1c-5.3      4)  Sleep Apnea evaluation:   +TREVOR dx. Pt uses CPAP.       5) Treatment for MRSA/Staph Aureus:  Neg      6) Discharge Plan:  Home w/ spouse.      7) Skin: Denies open wounds, cuts, sores, rashes or other areas of concern in PAT assessment.      8) Additional Concerns:  HTN, GERD, CKD 3(Cr-1.74 on PAT labs; pt followed by Dr. Hernandez), mild cognitive impairment, biploar, dep/anx      9) Medication Recommendations Prior to Surgery:  Hold sildenafil for 7 days PTS and take amlodipine and doxazosin DOS      Additional Orders for Day of Surgery:  none      Records Scanned in Epic:   None              Vital Signs:        Vitals:    25 1338   BP: (!) 154/62   Pulse: 52   Resp: 18   Temp: 97.3 °F (36.3 °C)             Body mass index is 24.96 kg/m².       KNEE DISABILITY OSTEOARTHRITIS AND OUTCOME SCORE    Stiffness - The following question concerns the amount of joing stiffness you have experienced during the last week in your knee. Stiffness is a sensation of restriction or slowness in the ease with which you move your knee joint.  How severe is your knee stiffness after first wakening in the morning?: Moderate    Pain - What amount of knee pain have you experienced the last week during the following activities?  Twisting/pivoting on your

## 2025-01-10 PROBLEM — Z01.818 ENCOUNTER FOR PREADMISSION TESTING: Status: ACTIVE | Noted: 2025-01-10

## 2025-01-10 LAB
BACTERIA SPEC CULT: NORMAL
BACTERIA SPEC CULT: NORMAL
SERVICE CMNT-IMP: NORMAL

## 2025-01-14 ENCOUNTER — ANESTHESIA EVENT (OUTPATIENT)
Facility: HOSPITAL | Age: 75
End: 2025-01-14
Payer: MEDICARE

## 2025-01-15 ENCOUNTER — ANESTHESIA (OUTPATIENT)
Facility: HOSPITAL | Age: 75
End: 2025-01-15
Payer: MEDICARE

## 2025-01-15 ENCOUNTER — HOSPITAL ENCOUNTER (INPATIENT)
Facility: HOSPITAL | Age: 75
LOS: 3 days | Discharge: HOME | DRG: 470 | End: 2025-01-20
Attending: ORTHOPAEDIC SURGERY | Admitting: ORTHOPAEDIC SURGERY
Payer: MEDICARE

## 2025-01-15 DIAGNOSIS — M17.11 OSTEOARTHRITIS OF RIGHT KNEE, UNSPECIFIED OSTEOARTHRITIS TYPE: Primary | ICD-10-CM

## 2025-01-15 PROCEDURE — 2580000003 HC RX 258: Performed by: PHYSICIAN ASSISTANT

## 2025-01-15 PROCEDURE — 27447 TOTAL KNEE ARTHROPLASTY: CPT | Performed by: PHYSICIAN ASSISTANT

## 2025-01-15 PROCEDURE — 3600000005 HC SURGERY LEVEL 5 BASE: Performed by: ORTHOPAEDIC SURGERY

## 2025-01-15 PROCEDURE — C1713 ANCHOR/SCREW BN/BN,TIS/BN: HCPCS | Performed by: ORTHOPAEDIC SURGERY

## 2025-01-15 PROCEDURE — 8E0Y0CZ ROBOTIC ASSISTED PROCEDURE OF LOWER EXTREMITY, OPEN APPROACH: ICD-10-PCS | Performed by: ORTHOPAEDIC SURGERY

## 2025-01-15 PROCEDURE — 6360000002 HC RX W HCPCS: Performed by: ORTHOPAEDIC SURGERY

## 2025-01-15 PROCEDURE — 7100000001 HC PACU RECOVERY - ADDTL 15 MIN: Performed by: ORTHOPAEDIC SURGERY

## 2025-01-15 PROCEDURE — 6360000002 HC RX W HCPCS: Performed by: PHYSICIAN ASSISTANT

## 2025-01-15 PROCEDURE — 2580000003 HC RX 258: Performed by: NURSE ANESTHETIST, CERTIFIED REGISTERED

## 2025-01-15 PROCEDURE — 0SRC0J9 REPLACEMENT OF RIGHT KNEE JOINT WITH SYNTHETIC SUBSTITUTE, CEMENTED, OPEN APPROACH: ICD-10-PCS | Performed by: ORTHOPAEDIC SURGERY

## 2025-01-15 PROCEDURE — 6370000000 HC RX 637 (ALT 250 FOR IP): Performed by: PHYSICIAN ASSISTANT

## 2025-01-15 PROCEDURE — 2580000003 HC RX 258: Performed by: ORTHOPAEDIC SURGERY

## 2025-01-15 PROCEDURE — 3600000015 HC SURGERY LEVEL 5 ADDTL 15MIN: Performed by: ORTHOPAEDIC SURGERY

## 2025-01-15 PROCEDURE — 2500000003 HC RX 250 WO HCPCS: Performed by: NURSE ANESTHETIST, CERTIFIED REGISTERED

## 2025-01-15 PROCEDURE — G0378 HOSPITAL OBSERVATION PER HR: HCPCS

## 2025-01-15 PROCEDURE — 2580000003 HC RX 258: Performed by: ANESTHESIOLOGY

## 2025-01-15 PROCEDURE — 7100000000 HC PACU RECOVERY - FIRST 15 MIN: Performed by: ORTHOPAEDIC SURGERY

## 2025-01-15 PROCEDURE — 2500000003 HC RX 250 WO HCPCS: Performed by: PHYSICIAN ASSISTANT

## 2025-01-15 PROCEDURE — 2709999900 HC NON-CHARGEABLE SUPPLY: Performed by: ORTHOPAEDIC SURGERY

## 2025-01-15 PROCEDURE — C1776 JOINT DEVICE (IMPLANTABLE): HCPCS | Performed by: ORTHOPAEDIC SURGERY

## 2025-01-15 PROCEDURE — 3700000001 HC ADD 15 MINUTES (ANESTHESIA): Performed by: ORTHOPAEDIC SURGERY

## 2025-01-15 PROCEDURE — 2500000003 HC RX 250 WO HCPCS: Performed by: ORTHOPAEDIC SURGERY

## 2025-01-15 PROCEDURE — 20985 CPTR-ASST DIR MS PX: CPT | Performed by: ORTHOPAEDIC SURGERY

## 2025-01-15 PROCEDURE — 27447 TOTAL KNEE ARTHROPLASTY: CPT | Performed by: ORTHOPAEDIC SURGERY

## 2025-01-15 PROCEDURE — 3700000000 HC ANESTHESIA ATTENDED CARE: Performed by: ORTHOPAEDIC SURGERY

## 2025-01-15 PROCEDURE — 6360000002 HC RX W HCPCS: Performed by: ANESTHESIOLOGY

## 2025-01-15 PROCEDURE — 6360000002 HC RX W HCPCS: Performed by: NURSE ANESTHETIST, CERTIFIED REGISTERED

## 2025-01-15 PROCEDURE — 2720000010 HC SURG SUPPLY STERILE: Performed by: ORTHOPAEDIC SURGERY

## 2025-01-15 DEVICE — ATTUNE KNEE SYSTEM TIBIAL BASE AFFIXIUM FIXED BEARING SIZE 8
Type: IMPLANTABLE DEVICE | Site: KNEE | Status: FUNCTIONAL
Brand: ATTUNE AFFIXIUM

## 2025-01-15 DEVICE — SMARTSET GHV GENTAMICIN HIGH VISCOSITY BONE CEMENT 40G
Type: IMPLANTABLE DEVICE | Site: KNEE | Status: FUNCTIONAL
Brand: SMARTSET

## 2025-01-15 DEVICE — ATTUNE KNEE SYSTEM FEMORAL POROCOAT CRUCIATE RETAINING SIZE 8 RIGHT CEMENTLESS
Type: IMPLANTABLE DEVICE | Site: KNEE | Status: FUNCTIONAL
Brand: ATTUNE

## 2025-01-15 DEVICE — KNEE K2 TOT HEMI ADV CMTLS IMPL CAPPED SYNTHES: Type: IMPLANTABLE DEVICE | Status: FUNCTIONAL

## 2025-01-15 DEVICE — ATTUNE KNEE SYSTEM TIBIAL INSERT FIXED BEARING MEDIAL STABILIZED RIGHT AOX 8, 6MM
Type: IMPLANTABLE DEVICE | Site: KNEE | Status: FUNCTIONAL
Brand: ATTUNE

## 2025-01-15 DEVICE — ATTUNE PATELLA MEDIALIZED DOME 38MM CEMENTED AOX
Type: IMPLANTABLE DEVICE | Site: KNEE | Status: FUNCTIONAL
Brand: ATTUNE

## 2025-01-15 RX ORDER — DEXAMETHASONE SODIUM PHOSPHATE 10 MG/ML
8 INJECTION, SOLUTION INTRAMUSCULAR; INTRAVENOUS ONCE
Status: DISCONTINUED | OUTPATIENT
Start: 2025-01-15 | End: 2025-01-15 | Stop reason: HOSPADM

## 2025-01-15 RX ORDER — HYDRALAZINE HYDROCHLORIDE 20 MG/ML
10 INJECTION INTRAMUSCULAR; INTRAVENOUS
Status: DISCONTINUED | OUTPATIENT
Start: 2025-01-15 | End: 2025-01-15 | Stop reason: HOSPADM

## 2025-01-15 RX ORDER — LIDOCAINE HYDROCHLORIDE 10 MG/ML
1 INJECTION, SOLUTION EPIDURAL; INFILTRATION; INTRACAUDAL; PERINEURAL
Status: DISCONTINUED | OUTPATIENT
Start: 2025-01-15 | End: 2025-01-15 | Stop reason: HOSPADM

## 2025-01-15 RX ORDER — SODIUM CHLORIDE 0.9 % (FLUSH) 0.9 %
5-40 SYRINGE (ML) INJECTION EVERY 12 HOURS SCHEDULED
Status: DISCONTINUED | OUTPATIENT
Start: 2025-01-15 | End: 2025-01-15 | Stop reason: HOSPADM

## 2025-01-15 RX ORDER — SODIUM CHLORIDE 0.9 % (FLUSH) 0.9 %
5-40 SYRINGE (ML) INJECTION PRN
Status: DISCONTINUED | OUTPATIENT
Start: 2025-01-15 | End: 2025-01-20 | Stop reason: HOSPADM

## 2025-01-15 RX ORDER — HYDROMORPHONE HYDROCHLORIDE 1 MG/ML
INJECTION, SOLUTION INTRAMUSCULAR; INTRAVENOUS; SUBCUTANEOUS
Status: DISCONTINUED | OUTPATIENT
Start: 2025-01-15 | End: 2025-01-15 | Stop reason: SDUPTHER

## 2025-01-15 RX ORDER — OXYCODONE HYDROCHLORIDE 5 MG/1
5 TABLET ORAL EVERY 4 HOURS PRN
Status: DISCONTINUED | OUTPATIENT
Start: 2025-01-15 | End: 2025-01-17

## 2025-01-15 RX ORDER — ONDANSETRON 2 MG/ML
4 INJECTION INTRAMUSCULAR; INTRAVENOUS AS NEEDED
Status: DISCONTINUED | OUTPATIENT
Start: 2025-01-15 | End: 2025-01-15 | Stop reason: HOSPADM

## 2025-01-15 RX ORDER — ACETAMINOPHEN 500 MG
1000 TABLET ORAL ONCE
Status: DISCONTINUED | OUTPATIENT
Start: 2025-01-15 | End: 2025-01-15 | Stop reason: SDUPTHER

## 2025-01-15 RX ORDER — HYDROMORPHONE HYDROCHLORIDE 1 MG/ML
0.5 INJECTION, SOLUTION INTRAMUSCULAR; INTRAVENOUS; SUBCUTANEOUS EVERY 5 MIN PRN
Status: COMPLETED | OUTPATIENT
Start: 2025-01-15 | End: 2025-01-15

## 2025-01-15 RX ORDER — SODIUM CHLORIDE 0.9 % (FLUSH) 0.9 %
5-40 SYRINGE (ML) INJECTION PRN
Status: DISCONTINUED | OUTPATIENT
Start: 2025-01-15 | End: 2025-01-15 | Stop reason: HOSPADM

## 2025-01-15 RX ORDER — MIDAZOLAM HYDROCHLORIDE 1 MG/ML
INJECTION, SOLUTION INTRAMUSCULAR; INTRAVENOUS
Status: DISCONTINUED | OUTPATIENT
Start: 2025-01-15 | End: 2025-01-15 | Stop reason: SDUPTHER

## 2025-01-15 RX ORDER — HYDROXYZINE HYDROCHLORIDE 10 MG/1
10 TABLET, FILM COATED ORAL EVERY 8 HOURS PRN
Status: DISCONTINUED | OUTPATIENT
Start: 2025-01-15 | End: 2025-01-20 | Stop reason: HOSPADM

## 2025-01-15 RX ORDER — ONDANSETRON 2 MG/ML
4 INJECTION INTRAMUSCULAR; INTRAVENOUS
Status: DISCONTINUED | OUTPATIENT
Start: 2025-01-15 | End: 2025-01-15 | Stop reason: HOSPADM

## 2025-01-15 RX ORDER — LITHIUM CARBONATE 300 MG
150 TABLET ORAL DAILY
Status: DISCONTINUED | OUTPATIENT
Start: 2025-01-15 | End: 2025-01-20 | Stop reason: HOSPADM

## 2025-01-15 RX ORDER — PHENYLEPHRINE HCL IN 0.9% NACL 0.4MG/10ML
SYRINGE (ML) INTRAVENOUS
Status: DISCONTINUED | OUTPATIENT
Start: 2025-01-15 | End: 2025-01-15 | Stop reason: SDUPTHER

## 2025-01-15 RX ORDER — BISACODYL 10 MG
10 SUPPOSITORY, RECTAL RECTAL DAILY PRN
Status: DISCONTINUED | OUTPATIENT
Start: 2025-01-15 | End: 2025-01-20

## 2025-01-15 RX ORDER — ONDANSETRON 2 MG/ML
4 INJECTION INTRAMUSCULAR; INTRAVENOUS EVERY 6 HOURS PRN
Status: DISCONTINUED | OUTPATIENT
Start: 2025-01-15 | End: 2025-01-20 | Stop reason: HOSPADM

## 2025-01-15 RX ORDER — TRANEXAMIC ACID 100 MG/ML
INJECTION, SOLUTION INTRAVENOUS PRN
Status: DISCONTINUED | OUTPATIENT
Start: 2025-01-15 | End: 2025-01-15 | Stop reason: HOSPADM

## 2025-01-15 RX ORDER — LIDOCAINE HYDROCHLORIDE 10 MG/ML
INJECTION, SOLUTION INFILTRATION; PERINEURAL PRN
Status: DISCONTINUED | OUTPATIENT
Start: 2025-01-15 | End: 2025-01-15 | Stop reason: HOSPADM

## 2025-01-15 RX ORDER — SENNA AND DOCUSATE SODIUM 50; 8.6 MG/1; MG/1
1 TABLET, FILM COATED ORAL 2 TIMES DAILY
Status: DISCONTINUED | OUTPATIENT
Start: 2025-01-15 | End: 2025-01-20 | Stop reason: HOSPADM

## 2025-01-15 RX ORDER — SODIUM CHLORIDE 9 MG/ML
INJECTION, SOLUTION INTRAVENOUS CONTINUOUS
Status: DISCONTINUED | OUTPATIENT
Start: 2025-01-15 | End: 2025-01-20 | Stop reason: HOSPADM

## 2025-01-15 RX ORDER — MIDAZOLAM HYDROCHLORIDE 2 MG/2ML
2 INJECTION, SOLUTION INTRAMUSCULAR; INTRAVENOUS PRN
Status: DISCONTINUED | OUTPATIENT
Start: 2025-01-15 | End: 2025-01-15 | Stop reason: HOSPADM

## 2025-01-15 RX ORDER — DEXMEDETOMIDINE HYDROCHLORIDE 100 UG/ML
INJECTION, SOLUTION INTRAVENOUS
Status: DISCONTINUED | OUTPATIENT
Start: 2025-01-15 | End: 2025-01-15 | Stop reason: SDUPTHER

## 2025-01-15 RX ORDER — SODIUM CHLORIDE, SODIUM LACTATE, POTASSIUM CHLORIDE, CALCIUM CHLORIDE 600; 310; 30; 20 MG/100ML; MG/100ML; MG/100ML; MG/100ML
INJECTION, SOLUTION INTRAVENOUS CONTINUOUS
Status: DISCONTINUED | OUTPATIENT
Start: 2025-01-15 | End: 2025-01-15 | Stop reason: HOSPADM

## 2025-01-15 RX ORDER — ACETAMINOPHEN 325 MG/1
650 TABLET ORAL EVERY 6 HOURS
Status: DISCONTINUED | OUTPATIENT
Start: 2025-01-15 | End: 2025-01-20 | Stop reason: HOSPADM

## 2025-01-15 RX ORDER — SODIUM CHLORIDE 9 MG/ML
INJECTION, SOLUTION INTRAVENOUS PRN
Status: DISCONTINUED | OUTPATIENT
Start: 2025-01-15 | End: 2025-01-20 | Stop reason: HOSPADM

## 2025-01-15 RX ORDER — FAMOTIDINE 20 MG/1
20 TABLET, FILM COATED ORAL DAILY
Status: DISCONTINUED | OUTPATIENT
Start: 2025-01-15 | End: 2025-01-20 | Stop reason: HOSPADM

## 2025-01-15 RX ORDER — DOXAZOSIN 1 MG/1
1 TABLET ORAL DAILY
Status: DISCONTINUED | OUTPATIENT
Start: 2025-01-15 | End: 2025-01-20 | Stop reason: HOSPADM

## 2025-01-15 RX ORDER — AMLODIPINE BESYLATE 5 MG/1
5 TABLET ORAL DAILY
Status: DISCONTINUED | OUTPATIENT
Start: 2025-01-15 | End: 2025-01-20 | Stop reason: HOSPADM

## 2025-01-15 RX ORDER — NALOXONE HYDROCHLORIDE 0.4 MG/ML
INJECTION, SOLUTION INTRAMUSCULAR; INTRAVENOUS; SUBCUTANEOUS PRN
Status: DISCONTINUED | OUTPATIENT
Start: 2025-01-15 | End: 2025-01-15 | Stop reason: HOSPADM

## 2025-01-15 RX ORDER — FENTANYL CITRATE 50 UG/ML
25 INJECTION, SOLUTION INTRAMUSCULAR; INTRAVENOUS EVERY 5 MIN PRN
Status: COMPLETED | OUTPATIENT
Start: 2025-01-15 | End: 2025-01-15

## 2025-01-15 RX ORDER — SODIUM CHLORIDE 9 MG/ML
INJECTION, SOLUTION INTRAVENOUS PRN
Status: DISCONTINUED | OUTPATIENT
Start: 2025-01-15 | End: 2025-01-15 | Stop reason: HOSPADM

## 2025-01-15 RX ORDER — ACETAMINOPHEN 500 MG
1000 TABLET ORAL ONCE
Status: COMPLETED | OUTPATIENT
Start: 2025-01-15 | End: 2025-01-15

## 2025-01-15 RX ORDER — ONDANSETRON 2 MG/ML
INJECTION INTRAMUSCULAR; INTRAVENOUS
Status: DISCONTINUED | OUTPATIENT
Start: 2025-01-15 | End: 2025-01-15 | Stop reason: SDUPTHER

## 2025-01-15 RX ORDER — ATORVASTATIN CALCIUM 10 MG/1
10 TABLET, FILM COATED ORAL DAILY
Status: DISCONTINUED | OUTPATIENT
Start: 2025-01-15 | End: 2025-01-20 | Stop reason: HOSPADM

## 2025-01-15 RX ORDER — QUETIAPINE FUMARATE 100 MG/1
300 TABLET, FILM COATED ORAL NIGHTLY
Status: DISCONTINUED | OUTPATIENT
Start: 2025-01-15 | End: 2025-01-20 | Stop reason: HOSPADM

## 2025-01-15 RX ORDER — LAMOTRIGINE 100 MG/1
150 TABLET ORAL 2 TIMES DAILY
Status: DISCONTINUED | OUTPATIENT
Start: 2025-01-15 | End: 2025-01-20 | Stop reason: HOSPADM

## 2025-01-15 RX ORDER — FENTANYL CITRATE 50 UG/ML
INJECTION, SOLUTION INTRAMUSCULAR; INTRAVENOUS
Status: DISCONTINUED | OUTPATIENT
Start: 2025-01-15 | End: 2025-01-15 | Stop reason: SDUPTHER

## 2025-01-15 RX ORDER — LEVOTHYROXINE SODIUM 150 UG/1
150 TABLET ORAL DAILY
Status: DISCONTINUED | OUTPATIENT
Start: 2025-01-15 | End: 2025-01-20 | Stop reason: HOSPADM

## 2025-01-15 RX ORDER — NALOXONE HYDROCHLORIDE 0.4 MG/ML
0.4 INJECTION, SOLUTION INTRAMUSCULAR; INTRAVENOUS; SUBCUTANEOUS PRN
Status: DISCONTINUED | OUTPATIENT
Start: 2025-01-15 | End: 2025-01-20 | Stop reason: HOSPADM

## 2025-01-15 RX ORDER — ONDANSETRON 4 MG/1
4 TABLET, ORALLY DISINTEGRATING ORAL EVERY 8 HOURS PRN
Status: DISCONTINUED | OUTPATIENT
Start: 2025-01-15 | End: 2025-01-20 | Stop reason: HOSPADM

## 2025-01-15 RX ORDER — PROCHLORPERAZINE EDISYLATE 5 MG/ML
5 INJECTION INTRAMUSCULAR; INTRAVENOUS
Status: DISCONTINUED | OUTPATIENT
Start: 2025-01-15 | End: 2025-01-15 | Stop reason: HOSPADM

## 2025-01-15 RX ORDER — ASPIRIN 81 MG/1
81 TABLET ORAL 2 TIMES DAILY
Status: DISCONTINUED | OUTPATIENT
Start: 2025-01-15 | End: 2025-01-20 | Stop reason: HOSPADM

## 2025-01-15 RX ORDER — SODIUM CHLORIDE 0.9 % (FLUSH) 0.9 %
5-40 SYRINGE (ML) INJECTION EVERY 12 HOURS SCHEDULED
Status: DISCONTINUED | OUTPATIENT
Start: 2025-01-15 | End: 2025-01-20 | Stop reason: HOSPADM

## 2025-01-15 RX ORDER — POLYETHYLENE GLYCOL 3350 17 G/17G
17 POWDER, FOR SOLUTION ORAL DAILY
Status: DISCONTINUED | OUTPATIENT
Start: 2025-01-15 | End: 2025-01-20 | Stop reason: HOSPADM

## 2025-01-15 RX ORDER — GLYCOPYRROLATE 0.2 MG/ML
INJECTION INTRAMUSCULAR; INTRAVENOUS
Status: DISCONTINUED | OUTPATIENT
Start: 2025-01-15 | End: 2025-01-15 | Stop reason: SDUPTHER

## 2025-01-15 RX ORDER — FENTANYL CITRATE 50 UG/ML
100 INJECTION, SOLUTION INTRAMUSCULAR; INTRAVENOUS
Status: DISCONTINUED | OUTPATIENT
Start: 2025-01-15 | End: 2025-01-15 | Stop reason: HOSPADM

## 2025-01-15 RX ORDER — OXYCODONE HYDROCHLORIDE 5 MG/1
5 TABLET ORAL
Status: DISCONTINUED | OUTPATIENT
Start: 2025-01-15 | End: 2025-01-15 | Stop reason: HOSPADM

## 2025-01-15 RX ORDER — 0.9 % SODIUM CHLORIDE 0.9 %
500 INTRAVENOUS SOLUTION INTRAVENOUS PRN
Status: DISCONTINUED | OUTPATIENT
Start: 2025-01-15 | End: 2025-01-20 | Stop reason: HOSPADM

## 2025-01-15 RX ADMIN — WATER 2000 MG: 1 INJECTION INTRAMUSCULAR; INTRAVENOUS; SUBCUTANEOUS at 12:38

## 2025-01-15 RX ADMIN — POLYETHYLENE GLYCOL 3350 17 G: 17 POWDER, FOR SOLUTION ORAL at 17:26

## 2025-01-15 RX ADMIN — SODIUM CHLORIDE: 9 INJECTION, SOLUTION INTRAVENOUS at 13:37

## 2025-01-15 RX ADMIN — LAMOTRIGINE 150 MG: 100 TABLET ORAL at 20:28

## 2025-01-15 RX ADMIN — HYDROMORPHONE HYDROCHLORIDE 0.2 MG: 1 INJECTION, SOLUTION INTRAMUSCULAR; INTRAVENOUS; SUBCUTANEOUS at 13:43

## 2025-01-15 RX ADMIN — SENNOSIDES AND DOCUSATE SODIUM 1 TABLET: 50; 8.6 TABLET ORAL at 20:28

## 2025-01-15 RX ADMIN — Medication 80 MCG: at 12:41

## 2025-01-15 RX ADMIN — QUETIAPINE FUMARATE 300 MG: 100 TABLET ORAL at 20:28

## 2025-01-15 RX ADMIN — ATORVASTATIN CALCIUM 10 MG: 10 TABLET, FILM COATED ORAL at 17:26

## 2025-01-15 RX ADMIN — SODIUM CHLORIDE, POTASSIUM CHLORIDE, SODIUM LACTATE AND CALCIUM CHLORIDE: 600; 310; 30; 20 INJECTION, SOLUTION INTRAVENOUS at 12:03

## 2025-01-15 RX ADMIN — SODIUM CHLORIDE: 9 INJECTION, SOLUTION INTRAVENOUS at 16:45

## 2025-01-15 RX ADMIN — FENTANYL CITRATE 25 MCG: 50 INJECTION INTRAMUSCULAR; INTRAVENOUS at 15:05

## 2025-01-15 RX ADMIN — FENTANYL CITRATE 25 MCG: 50 INJECTION INTRAMUSCULAR; INTRAVENOUS at 12:16

## 2025-01-15 RX ADMIN — PROPOFOL 25 MG: 10 INJECTION, EMULSION INTRAVENOUS at 13:54

## 2025-01-15 RX ADMIN — FAMOTIDINE 20 MG: 20 TABLET, FILM COATED ORAL at 17:16

## 2025-01-15 RX ADMIN — ASPIRIN 81 MG: 81 TABLET, COATED ORAL at 20:28

## 2025-01-15 RX ADMIN — GLYCOPYRROLATE 0.2 MG: 0.2 INJECTION INTRAMUSCULAR; INTRAVENOUS at 12:47

## 2025-01-15 RX ADMIN — DEXMEDETOMIDINE 5 MCG: 100 INJECTION, SOLUTION, CONCENTRATE INTRAVENOUS at 12:41

## 2025-01-15 RX ADMIN — SODIUM CHLORIDE, PRESERVATIVE FREE 10 ML: 5 INJECTION INTRAVENOUS at 20:35

## 2025-01-15 RX ADMIN — HYDROMORPHONE HYDROCHLORIDE 0.5 MG: 1 INJECTION, SOLUTION INTRAMUSCULAR; INTRAVENOUS; SUBCUTANEOUS at 17:14

## 2025-01-15 RX ADMIN — TRANEXAMIC ACID 1000 MG: 100 INJECTION, SOLUTION INTRAVENOUS at 12:43

## 2025-01-15 RX ADMIN — PROPOFOL 25 MG: 10 INJECTION, EMULSION INTRAVENOUS at 12:35

## 2025-01-15 RX ADMIN — FENTANYL CITRATE 25 MCG: 50 INJECTION INTRAMUSCULAR; INTRAVENOUS at 12:21

## 2025-01-15 RX ADMIN — HYDROMORPHONE HYDROCHLORIDE 0.3 MG: 1 INJECTION, SOLUTION INTRAMUSCULAR; INTRAVENOUS; SUBCUTANEOUS at 13:48

## 2025-01-15 RX ADMIN — PROPOFOL 30 MCG/KG/MIN: 10 INJECTION, EMULSION INTRAVENOUS at 12:36

## 2025-01-15 RX ADMIN — ACETAMINOPHEN 650 MG: 325 TABLET ORAL at 23:34

## 2025-01-15 RX ADMIN — DEXMEDETOMIDINE 5 MCG: 100 INJECTION, SOLUTION, CONCENTRATE INTRAVENOUS at 12:35

## 2025-01-15 RX ADMIN — ONDANSETRON 4 MG: 2 INJECTION INTRAMUSCULAR; INTRAVENOUS at 13:35

## 2025-01-15 RX ADMIN — WATER 2000 MG: 1 INJECTION INTRAMUSCULAR; INTRAVENOUS; SUBCUTANEOUS at 20:35

## 2025-01-15 RX ADMIN — FENTANYL CITRATE 25 MCG: 50 INJECTION INTRAMUSCULAR; INTRAVENOUS at 14:45

## 2025-01-15 RX ADMIN — FENTANYL CITRATE 25 MCG: 50 INJECTION INTRAMUSCULAR; INTRAVENOUS at 14:38

## 2025-01-15 RX ADMIN — DOXAZOSIN 1 MG: 1 TABLET ORAL at 17:26

## 2025-01-15 RX ADMIN — LITHIUM CARBONATE 150 MG: 300 TABLET ORAL at 17:18

## 2025-01-15 RX ADMIN — PHENYLEPHRINE HYDROCHLORIDE 40 MCG/MIN: 10 INJECTION INTRAVENOUS at 12:35

## 2025-01-15 RX ADMIN — FENTANYL CITRATE 25 MCG: 50 INJECTION INTRAMUSCULAR; INTRAVENOUS at 14:57

## 2025-01-15 RX ADMIN — PROPOFOL 25 MG: 10 INJECTION, EMULSION INTRAVENOUS at 13:55

## 2025-01-15 RX ADMIN — HYDROMORPHONE HYDROCHLORIDE 0.3 MG: 1 INJECTION, SOLUTION INTRAMUSCULAR; INTRAVENOUS; SUBCUTANEOUS at 13:56

## 2025-01-15 RX ADMIN — HYDROMORPHONE HYDROCHLORIDE 0.5 MG: 1 INJECTION, SOLUTION INTRAMUSCULAR; INTRAVENOUS; SUBCUTANEOUS at 14:57

## 2025-01-15 RX ADMIN — FENTANYL CITRATE 50 MCG: 50 INJECTION INTRAMUSCULAR; INTRAVENOUS at 12:27

## 2025-01-15 RX ADMIN — HYDROMORPHONE HYDROCHLORIDE 0.2 MG: 1 INJECTION, SOLUTION INTRAMUSCULAR; INTRAVENOUS; SUBCUTANEOUS at 13:53

## 2025-01-15 RX ADMIN — DEXMEDETOMIDINE 5 MCG: 100 INJECTION, SOLUTION, CONCENTRATE INTRAVENOUS at 12:49

## 2025-01-15 RX ADMIN — ACETAMINOPHEN 650 MG: 325 TABLET ORAL at 17:17

## 2025-01-15 RX ADMIN — DEXMEDETOMIDINE 5 MCG: 100 INJECTION, SOLUTION, CONCENTRATE INTRAVENOUS at 12:45

## 2025-01-15 RX ADMIN — ACETAMINOPHEN 1000 MG: 500 TABLET ORAL at 11:23

## 2025-01-15 RX ADMIN — MIDAZOLAM 2 MG: 1 INJECTION INTRAMUSCULAR; INTRAVENOUS at 12:04

## 2025-01-15 RX ADMIN — HYDROMORPHONE HYDROCHLORIDE 0.5 MG: 1 INJECTION, SOLUTION INTRAMUSCULAR; INTRAVENOUS; SUBCUTANEOUS at 14:38

## 2025-01-15 RX ADMIN — OXYCODONE 5 MG: 5 TABLET ORAL at 20:31

## 2025-01-15 ASSESSMENT — PAIN DESCRIPTION - ORIENTATION
ORIENTATION: RIGHT
ORIENTATION: LEFT
ORIENTATION: RIGHT
ORIENTATION: RIGHT

## 2025-01-15 ASSESSMENT — PAIN - FUNCTIONAL ASSESSMENT
PAIN_FUNCTIONAL_ASSESSMENT: 0-10
PAIN_FUNCTIONAL_ASSESSMENT: PREVENTS OR INTERFERES SOME ACTIVE ACTIVITIES AND ADLS

## 2025-01-15 ASSESSMENT — PAIN DESCRIPTION - LOCATION
LOCATION: KNEE
LOCATION: LEG
LOCATION: KNEE
LOCATION: LEG

## 2025-01-15 ASSESSMENT — PAIN SCALES - GENERAL
PAINLEVEL_OUTOF10: 9
PAINLEVEL_OUTOF10: 7
PAINLEVEL_OUTOF10: 9
PAINLEVEL_OUTOF10: 6
PAINLEVEL_OUTOF10: 3
PAINLEVEL_OUTOF10: 8
PAINLEVEL_OUTOF10: 8

## 2025-01-15 ASSESSMENT — PAIN DESCRIPTION - DESCRIPTORS: DESCRIPTORS: ACHING;SORE

## 2025-01-15 NOTE — FLOWSHEET NOTE
01/15/25 1259   Family Communication    Relationship to Patient Spouse    Phone Number Piper Chacon [phone]   Family/Significant Other Update Called   Delivery Origin Nurse   Family Communication   Family Update Message Procedure started

## 2025-01-15 NOTE — ANESTHESIA POSTPROCEDURE EVALUATION
Department of Anesthesiology  Postprocedure Note    Patient: Andrea Chacon  MRN: [id number]  YOB: 1950  Date of evaluation: 1/15/2025    Procedure Summary       Date: 01/15/25 Room / Location: [facility] MAIN OR [address] [place] [address] MAIN OR    Anesthesia Start: 1204 Anesthesia Stop: 1413    Procedure: ROBOTIC RIGHT TOTAL KNEE ARTHROPLASTY (VELYS) (Right: Knee) Diagnosis:       Arthritis of right knee      (Arthritis of right knee [M17.11])    Providers: Sachin Glynn MD Responsible Provider: Ericka Bah DO    Anesthesia Type: MAC, Spinal ASA Status: 2            Anesthesia Type: MAC, Spinal    Cat Phase I: Cat Score: 8    Cat Phase II:      Anesthesia Post Evaluation    Patient location during evaluation: PACU  Level of consciousness: awake  Airway patency: patent  Nausea & Vomiting: no nausea  Cardiovascular status: hemodynamically stable  Respiratory status: acceptable  Hydration status: stable  Multimodal analgesia pain management approach  Pain management: adequate    There were no known notable events for this encounter.

## 2025-01-15 NOTE — DISCHARGE INSTRUCTIONS
[phone] after 5pm or on a weekend. The on call physician will return your phone call  Call your Primary Care Doctor for:   Concerns about your medical conditions such as diabetes, high blood pressure, asthma, congestive heart failure  Blood sugars greater than 180  Persistent headache or dizziness  Coughing or congestion  Constipation or diarrhea  Burning when you go to the bathroom  Abnormal heart rate (fast or  slow)      Call 911 and go to the nearest hospital for:   Sudden increased shortness of breath  Sudden onset of chest pain  Difficulty breathing  Localized chest pain with coughing or taking a deep breath

## 2025-01-15 NOTE — ANESTHESIA PRE PROCEDURE
Department of Anesthesiology  Preprocedure Note       Name:  Andrea Chacon   Age:  74 y.o.  :  1950                                          MRN:  [id number]         Date:  1/15/2025      Surgeon: Surgeon(s):  Sachin Glynn MD    Procedure: Procedure(s):  RIGHT TOTAL KNEE ARTHROPLASTY (VELYS)    Medications prior to admission:   Prior to Admission medications    Medication Sig Start Date End Date Taking? Authorizing Provider   Cyanocobalamin (VITAMIN B 12 PO) Take 500 mcg by mouth daily    Gisela Joshi MD   Ergocalciferol (VITAMIN D2 PO) Take 60 mcg by mouth once a week TAKES ON SUNDAYS    Gisela Joshi MD   sodium chloride (OCEAN, BABY AYR) 0.65 % nasal spray 1 spray by Nasal route as needed for Congestion    Gisela Joshi MD   amLODIPine (NORVASC) 5 MG tablet TAKE 1 TABLET DAILY 24   GWENDOLYN Bundy IV, MD   atorvastatin (LIPITOR) 10 MG tablet TAKE 1 TABLET DAILY 24   GWENDOLYN Bundy IV, MD   Vitamin D3 125 MCG (5000 UT) TABS tablet Take 1 tablet by mouth daily    Gisela Joshi MD   doxazosin (CARDURA) 1 MG tablet TAKE 1 TABLET AT BEDTIME 10/7/24   GWENDOLYN Bundy IV, MD   colchicine (COLCRYS) 0.6 MG tablet Take 1 tablet by mouth 2 times daily as needed (gout) Use instead of Medrol dose pack 24   GWENDOLYN Bunyd IV, MD   levothyroxine (SYNTHROID) 150 MCG tablet TAKE 1 TABLET DAILY 24   GWENDOLYN Bundy IV, MD   lithium 150 MG capsule Take 1 capsule by mouth daily    ProviderGisela MD   sildenafil (REVATIO) 20 MG tablet TAKE ONE TO THREE TABLETS BY MOUTH ONE HOUR BEFORE RELATIONS **MAX 3 TABLETS DAILY, USE INSTEAD OF CIALIS (TADALAFIL)** 23  GWENDOLYN Bundy IV, MD   carboxymethylcellulose (REFRESH PLUS) 0.5 % SOLN ophthalmic solution Apply 1 drop to eye 2 times daily    Automatic Reconciliation, Ar   diclofenac sodium (VOLTAREN) 1 % GEL Apply topically 4 times daily 20   Automatic Reconciliation, Ar

## 2025-01-15 NOTE — H&P
Update History & Physical    The patient's History and Physical  was reviewed with the patient and I examined the patient. There was no change. The surgical site was confirmed by the patient and me.       Plan: The risks, benefits, expected outcome, and alternative to the recommended procedure have been discussed with the patient. Patient understands and wants to proceed with the procedure.     Electronically signed by Sachin Glynn MD on 1/15/2025 at 10:47 AM

## 2025-01-15 NOTE — OP NOTE
Name: Andrea Chacon  MRN:  [id number]  : 1950  Age:  74 y.o.  Surgery Date: 1/15/2025      OPERATIVE REPORT - RIGHT TOTAL KNEE REPLACEMENT-SUBVASTUS    PREOPERATIVE DIAGNOSIS: Osteoarthritis, right knee.    POSTOPERATIVE DIAGNOSIS: Osteoarthritis, right knee.    PROCEDURE PERFORMED: Computer assisted Right total knee arthroplasty Velys Robot    SURGEON: Sachin Glynn MD    FIRST ASSISTANT:  Magalie Alvarez PA-C    ANESTHESIA: Spinal    PRE-OP ANTIBIOTIC: Ancef 2g    COMPLICATIONS: None.    ESTIMATED BLOOD LOSS: 100 mL.    SPECIMENS REMOVED: None.    COMPONENTS IMPLANTED:   Implant Name Type Inv. Item Serial No.  Lot No. LRB No. Used Action   COMPONENT PAT [id number] POLYETH DOME YESY MEDIALIZED ATTUNE - SN/A  COMPONENT PAT [id number] POLYETH DOME YESY MEDIALIZED ATTUNE N/A [facility] [id number] Right 1 Implanted   CEMENT BNE 40GM FULL DOSE PMMA W/ GENT HI VISC RADPQ LNG - SN/A  CEMENT BNE 40GM FULL DOSE PMMA W/ GENT HI VISC RADPQ LNG N/A [facility] [id number] Right 1 Implanted   ATTUNE FEM POR CR RT SZ 8 - SN/A  ATTUNE FEM POR CR RT SZ 8 N/A [email or web address] [id number] Right 1 Implanted   BEARING TIB FIX 8 KNEE BASE POROUS ATTUNE AFFIXIUM - SN/A  BEARING TIB FIX 8 KNEE BASE POROUS ATTUNE AFFIXIUM N/A [email or web address] [id number] Right 1 Implanted   INSERT TIB FIX BEAR 8 6 MM RT MEDL KNEE STBL AOX ATTUNE - SN/A  INSERT TIB FIX BEAR 8 6 MM RT MEDL KNEE STBL AOX ATTUNE N/A [email or web address] [id number] Right 1 Implanted       INDICATIONS: The patient is an 74 yrs male with progressive debilitating right knee pain due to severe osteoarthritis. Symptoms have progressed despite comprehensive conservative treatment and they presents for right total knee replacement. Risks, benefits, alternatives of the procedure were reviewed in detail and the patient desired to proceed. Risks including bleeding, infection, damage to surrounding

## 2025-01-16 LAB
ANION GAP SERPL CALC-SCNC: 8 MMOL/L (ref 2–12)
BUN SERPL-MCNC: 21 MG/DL (ref 6–20)
BUN/CREAT SERPL: 13 (ref 12–20)
CALCIUM SERPL-MCNC: 9.2 MG/DL (ref 8.5–10.1)
CHLORIDE SERPL-SCNC: 107 MMOL/L (ref 97–108)
CO2 SERPL-SCNC: 22 MMOL/L (ref 21–32)
CREAT SERPL-MCNC: 1.66 MG/DL (ref 0.7–1.3)
GLUCOSE BLD STRIP.AUTO-MCNC: 131 MG/DL (ref 65–117)
GLUCOSE SERPL-MCNC: 139 MG/DL (ref 65–100)
HCT VFR BLD AUTO: 41.9 % (ref 36.6–50.3)
HGB BLD-MCNC: 13.6 G/DL (ref 12.1–17)
POTASSIUM SERPL-SCNC: 4.6 MMOL/L (ref 3.5–5.1)
SERVICE CMNT-IMP: ABNORMAL
SODIUM SERPL-SCNC: 137 MMOL/L (ref 136–145)

## 2025-01-16 PROCEDURE — 6370000000 HC RX 637 (ALT 250 FOR IP): Performed by: NURSE PRACTITIONER

## 2025-01-16 PROCEDURE — 6370000000 HC RX 637 (ALT 250 FOR IP): Performed by: PHYSICIAN ASSISTANT

## 2025-01-16 PROCEDURE — 51701 INSERT BLADDER CATHETER: CPT

## 2025-01-16 PROCEDURE — 85018 HEMOGLOBIN: CPT

## 2025-01-16 PROCEDURE — G0378 HOSPITAL OBSERVATION PER HR: HCPCS

## 2025-01-16 PROCEDURE — 2580000003 HC RX 258: Performed by: PHYSICIAN ASSISTANT

## 2025-01-16 PROCEDURE — 51798 US URINE CAPACITY MEASURE: CPT

## 2025-01-16 PROCEDURE — 2500000003 HC RX 250 WO HCPCS: Performed by: PHYSICIAN ASSISTANT

## 2025-01-16 PROCEDURE — 6360000002 HC RX W HCPCS: Performed by: PHYSICIAN ASSISTANT

## 2025-01-16 PROCEDURE — 97530 THERAPEUTIC ACTIVITIES: CPT

## 2025-01-16 PROCEDURE — 96375 TX/PRO/DX INJ NEW DRUG ADDON: CPT

## 2025-01-16 PROCEDURE — 97161 PT EVAL LOW COMPLEX 20 MIN: CPT

## 2025-01-16 PROCEDURE — 76937 US GUIDE VASCULAR ACCESS: CPT

## 2025-01-16 PROCEDURE — 85014 HEMATOCRIT: CPT

## 2025-01-16 PROCEDURE — 96374 THER/PROPH/DIAG INJ IV PUSH: CPT

## 2025-01-16 PROCEDURE — 97110 THERAPEUTIC EXERCISES: CPT

## 2025-01-16 PROCEDURE — 82962 GLUCOSE BLOOD TEST: CPT

## 2025-01-16 PROCEDURE — 6360000002 HC RX W HCPCS: Performed by: NURSE PRACTITIONER

## 2025-01-16 PROCEDURE — APPNB60 APP NON BILLABLE TIME 46-60 MINS: Performed by: NURSE PRACTITIONER

## 2025-01-16 PROCEDURE — 80048 BASIC METABOLIC PNL TOTAL CA: CPT

## 2025-01-16 RX ORDER — LIDOCAINE HYDROCHLORIDE 20 MG/ML
JELLY TOPICAL 4 TIMES DAILY PRN
Status: DISCONTINUED | OUTPATIENT
Start: 2025-01-16 | End: 2025-01-20 | Stop reason: HOSPADM

## 2025-01-16 RX ORDER — DEXAMETHASONE 4 MG/1
4 TABLET ORAL EVERY 12 HOURS SCHEDULED
Status: DISCONTINUED | OUTPATIENT
Start: 2025-01-16 | End: 2025-01-17

## 2025-01-16 RX ORDER — OXYCODONE HYDROCHLORIDE 5 MG/1
10 TABLET ORAL EVERY 4 HOURS PRN
Status: DISCONTINUED | OUTPATIENT
Start: 2025-01-16 | End: 2025-01-17

## 2025-01-16 RX ORDER — MORPHINE SULFATE 2 MG/ML
2 INJECTION, SOLUTION INTRAMUSCULAR; INTRAVENOUS EVERY 4 HOURS PRN
Status: DISCONTINUED | OUTPATIENT
Start: 2025-01-16 | End: 2025-01-17

## 2025-01-16 RX ADMIN — ACETAMINOPHEN 650 MG: 325 TABLET ORAL at 11:23

## 2025-01-16 RX ADMIN — ACETAMINOPHEN 650 MG: 325 TABLET ORAL at 22:46

## 2025-01-16 RX ADMIN — SENNOSIDES AND DOCUSATE SODIUM 1 TABLET: 50; 8.6 TABLET ORAL at 22:45

## 2025-01-16 RX ADMIN — DOXAZOSIN 1 MG: 1 TABLET ORAL at 09:22

## 2025-01-16 RX ADMIN — MORPHINE SULFATE 2 MG: 2 INJECTION, SOLUTION INTRAMUSCULAR; INTRAVENOUS at 15:55

## 2025-01-16 RX ADMIN — LAMOTRIGINE 150 MG: 100 TABLET ORAL at 09:29

## 2025-01-16 RX ADMIN — QUETIAPINE FUMARATE 300 MG: 100 TABLET ORAL at 22:46

## 2025-01-16 RX ADMIN — HYDROMORPHONE HYDROCHLORIDE 0.5 MG: 1 INJECTION, SOLUTION INTRAMUSCULAR; INTRAVENOUS; SUBCUTANEOUS at 04:43

## 2025-01-16 RX ADMIN — LIDOCAINE HYDROCHLORIDE: 20 JELLY TOPICAL at 15:38

## 2025-01-16 RX ADMIN — OXYCODONE 10 MG: 5 TABLET ORAL at 09:20

## 2025-01-16 RX ADMIN — SENNOSIDES AND DOCUSATE SODIUM 1 TABLET: 50; 8.6 TABLET ORAL at 09:23

## 2025-01-16 RX ADMIN — SODIUM CHLORIDE, PRESERVATIVE FREE 10 ML: 5 INJECTION INTRAVENOUS at 09:25

## 2025-01-16 RX ADMIN — ACETAMINOPHEN 650 MG: 325 TABLET ORAL at 17:16

## 2025-01-16 RX ADMIN — LAMOTRIGINE 150 MG: 100 TABLET ORAL at 22:46

## 2025-01-16 RX ADMIN — ASPIRIN 81 MG: 81 TABLET, COATED ORAL at 09:21

## 2025-01-16 RX ADMIN — WATER 2000 MG: 1 INJECTION INTRAMUSCULAR; INTRAVENOUS; SUBCUTANEOUS at 04:43

## 2025-01-16 RX ADMIN — FAMOTIDINE 20 MG: 20 TABLET, FILM COATED ORAL at 09:22

## 2025-01-16 RX ADMIN — SODIUM CHLORIDE: 9 INJECTION, SOLUTION INTRAVENOUS at 14:23

## 2025-01-16 RX ADMIN — AMLODIPINE BESYLATE 5 MG: 5 TABLET ORAL at 09:22

## 2025-01-16 RX ADMIN — LEVOTHYROXINE SODIUM 150 MCG: 0.15 TABLET ORAL at 09:24

## 2025-01-16 RX ADMIN — LITHIUM CARBONATE 150 MG: 300 TABLET ORAL at 09:21

## 2025-01-16 RX ADMIN — ACETAMINOPHEN 650 MG: 325 TABLET ORAL at 04:41

## 2025-01-16 RX ADMIN — OXYCODONE 10 MG: 5 TABLET ORAL at 13:38

## 2025-01-16 RX ADMIN — ATORVASTATIN CALCIUM 10 MG: 10 TABLET, FILM COATED ORAL at 09:21

## 2025-01-16 RX ADMIN — DEXAMETHASONE 4 MG: 4 TABLET ORAL at 17:16

## 2025-01-16 RX ADMIN — ASPIRIN 81 MG: 81 TABLET, COATED ORAL at 22:45

## 2025-01-16 RX ADMIN — ONDANSETRON 4 MG: 2 INJECTION INTRAMUSCULAR; INTRAVENOUS at 14:19

## 2025-01-16 RX ADMIN — OXYCODONE 10 MG: 5 TABLET ORAL at 22:46

## 2025-01-16 ASSESSMENT — PAIN SCALES - GENERAL
PAINLEVEL_OUTOF10: 8
PAINLEVEL_OUTOF10: 8
PAINLEVEL_OUTOF10: 7
PAINLEVEL_OUTOF10: 10
PAINLEVEL_OUTOF10: 7
PAINLEVEL_OUTOF10: 10

## 2025-01-16 ASSESSMENT — PAIN DESCRIPTION - ORIENTATION
ORIENTATION: RIGHT
ORIENTATION: RIGHT

## 2025-01-16 ASSESSMENT — PAIN DESCRIPTION - LOCATION
LOCATION: KNEE
LOCATION: KNEE

## 2025-01-16 NOTE — CARE COORDINATION
LYDIA:    Home with [facility]  Owns Walker  Wife will transport    Pt and wife live in senior living apartment at [facility]. CM confirmed face sheet, HH choice, and likely d/c tomorrow in light of pain management. Obs Pt       01/16/25 5043   Service Assessment   Patient Orientation Alert and Oriented   Cognition Alert   History Provided By Patient   Primary Caregiver Self   Support Systems Spouse/Significant Other   Patient's Healthcare Decision Maker is: Legal Next of Kin   PCP Verified by CM Yes   Last Visit to PCP Within last 3 months   Prior Functional Level Independent in ADLs/IADLs   Current Functional Level Assistance with the following:;Mobility   Can patient return to prior living arrangement Yes   Ability to make needs known: Good   Family able to assist with home care needs: Yes   Would you like for me to discuss the discharge plan with any other family members/significant others, and if so, who? Yes   Social/Functional History   Type of Home Senior housing apartment   Discharge Planning   Patient expects to be discharged to: Apartment   Services At/After Discharge   Confirm Follow Up Transport Family   Condition of Participation: Discharge Planning   The Plan for Transition of Care is related to the following treatment goals: Home Health   The Patient and/or Patient Representative was provided with a Choice of Provider? Patient   The Patient and/Or Patient Representative agree with the Discharge Plan? Yes   Freedom of Choice list was provided with basic dialogue that supports the patient's individualized plan of care/goals, treatment preferences, and shares the quality data associated with the providers?  Yes

## 2025-01-16 NOTE — PROCEDURES
Vascular Access Team - peripheral IV catheter insertion note     A 22 gauge, 45 mm (1.75 inch) PIV was inserted into the right ventral forearm using ultrasound guidance. The IV flushed easily and had brisk blood return. The patient tolerated the procedure well.   Gerson Mukherjee RN

## 2025-01-16 NOTE — PLAN OF CARE
Problem: Physical Therapy - Adult  Goal: By Discharge: Performs mobility at highest level of function for planned discharge setting.  See evaluation for individualized goals.  Description: FUNCTIONAL STATUS PRIOR TO ADMISSION: Patient was independent and active without use of DME.    HOME SUPPORT PRIOR TO ADMISSION: The patient lived with wife but did not require assistance.    Physical Therapy Goals  Initiated 1/16/2025  1.  Patient will move from supine to sit and sit to supine and roll side to side in bed with modified independence within 4 day(s).    2.  Patient will perform sit to stand with modified independence within 4 day(s).  3.  Patient will transfer from bed to chair and chair to bed with modified independence using the least restrictive device within 4 day(s).  4.  Patient will ambulate with modified independence for 150 feet with the least restrictive device within 4 day(s).   5.  Patient will ascend/descend 4 stairs with 1 handrail(s) with modified independence within 4 day(s).  6. Patient will perform TKA home exercise program per protocol with modified independence within 4 days.  7. Patient will demonstrate AROM 0-90 degrees in operative joint within 4 days.     Outcome: Progressing   PHYSICAL THERAPY EVALUATION    Patient: Andrea Chacon (74 y.o. male)  Date: 1/16/2025  Primary Diagnosis: Arthritis of right knee [M17.11]  Osteoarthritis of right knee, unspecified osteoarthritis type [M17.11]  Procedure(s) (LRB):  ROBOTIC RIGHT TOTAL KNEE ARTHROPLASTY (VELYS) (Right) 1 Day Post-Op   Precautions:                        ASSESSMENT :   DEFICITS/IMPAIRMENTS:   The patient is limited by decreased  mobility due to increased pain and associated nausea after R TKA, POD1.  PMHx is significant for HTN, CKD.  He reports that his pain was mostly uncontrolled overnight and he has not been even sitting on the edge of the bed yet.  Nursing notified and brought in pain medication. He was agreeable to

## 2025-01-16 NOTE — PLAN OF CARE
Problem: Pain  Goal: Verbalizes/displays adequate comfort level or baseline comfort level  Outcome: Progressing     Problem: Safety - Adult  Goal: Free from fall injury  Outcome: Progressing  Flowsheets (Taken 1/16/2025 0800)  Free From Fall Injury:   Instruct family/caregiver on patient safety   Based on caregiver fall risk screen, instruct family/caregiver to ask for assistance with transferring infant if caregiver noted to have fall risk factors     Problem: Discharge Planning  Goal: Discharge to home or other facility with appropriate resources  Outcome: Progressing  Flowsheets (Taken 1/16/2025 0800)  Discharge to home or other facility with appropriate resources:   Identify barriers to discharge with patient and caregiver   Arrange for needed discharge resources and transportation as appropriate   Identify discharge learning needs (meds, wound care, etc)   Refer to discharge planning if patient needs post-hospital services based on physician order or complex needs related to functional status, cognitive ability or social support system   Arrange for interpreters to assist at discharge as needed     Problem: Physical Therapy - Adult  Goal: By Discharge: Performs mobility at highest level of function for planned discharge setting.  See evaluation for individualized goals.  Description: FUNCTIONAL STATUS PRIOR TO ADMISSION: Patient was independent and active without use of DME.    HOME SUPPORT PRIOR TO ADMISSION: The patient lived with wife but did not require assistance.    Physical Therapy Goals  Initiated 1/16/2025  1.  Patient will move from supine to sit and sit to supine and roll side to side in bed with modified independence within 4 day(s).    2.  Patient will perform sit to stand with modified independence within 4 day(s).  3.  Patient will transfer from bed to chair and chair to bed with modified independence using the least restrictive device within 4 day(s).  4.  Patient will ambulate with modified

## 2025-01-17 PROCEDURE — 97530 THERAPEUTIC ACTIVITIES: CPT

## 2025-01-17 PROCEDURE — 6370000000 HC RX 637 (ALT 250 FOR IP): Performed by: PHYSICIAN ASSISTANT

## 2025-01-17 PROCEDURE — 6360000002 HC RX W HCPCS: Performed by: NURSE PRACTITIONER

## 2025-01-17 PROCEDURE — APPNB60 APP NON BILLABLE TIME 46-60 MINS: Performed by: NURSE PRACTITIONER

## 2025-01-17 PROCEDURE — 51798 US URINE CAPACITY MEASURE: CPT

## 2025-01-17 PROCEDURE — 1100000000 HC RM PRIVATE

## 2025-01-17 PROCEDURE — 6370000000 HC RX 637 (ALT 250 FOR IP): Performed by: NURSE PRACTITIONER

## 2025-01-17 PROCEDURE — 51702 INSERT TEMP BLADDER CATH: CPT

## 2025-01-17 PROCEDURE — 97116 GAIT TRAINING THERAPY: CPT

## 2025-01-17 PROCEDURE — 97535 SELF CARE MNGMENT TRAINING: CPT

## 2025-01-17 PROCEDURE — 97165 OT EVAL LOW COMPLEX 30 MIN: CPT

## 2025-01-17 RX ORDER — OXYCODONE HYDROCHLORIDE 5 MG/1
5 TABLET ORAL EVERY 4 HOURS PRN
Status: DISCONTINUED | OUTPATIENT
Start: 2025-01-17 | End: 2025-01-20 | Stop reason: HOSPADM

## 2025-01-17 RX ORDER — OXYCODONE HYDROCHLORIDE 5 MG/1
2.5 TABLET ORAL EVERY 4 HOURS PRN
Status: DISCONTINUED | OUTPATIENT
Start: 2025-01-17 | End: 2025-01-20 | Stop reason: HOSPADM

## 2025-01-17 RX ADMIN — ACETAMINOPHEN 650 MG: 325 TABLET ORAL at 11:34

## 2025-01-17 RX ADMIN — POLYETHYLENE GLYCOL 3350 17 G: 17 POWDER, FOR SOLUTION ORAL at 09:56

## 2025-01-17 RX ADMIN — SENNOSIDES AND DOCUSATE SODIUM 1 TABLET: 50; 8.6 TABLET ORAL at 09:56

## 2025-01-17 RX ADMIN — SENNOSIDES AND DOCUSATE SODIUM 1 TABLET: 50; 8.6 TABLET ORAL at 22:43

## 2025-01-17 RX ADMIN — LAMOTRIGINE 150 MG: 100 TABLET ORAL at 09:56

## 2025-01-17 RX ADMIN — ACETAMINOPHEN 650 MG: 325 TABLET ORAL at 22:38

## 2025-01-17 RX ADMIN — OXYCODONE 10 MG: 5 TABLET ORAL at 03:04

## 2025-01-17 RX ADMIN — FAMOTIDINE 20 MG: 20 TABLET, FILM COATED ORAL at 09:57

## 2025-01-17 RX ADMIN — QUETIAPINE FUMARATE 300 MG: 100 TABLET ORAL at 22:41

## 2025-01-17 RX ADMIN — ASPIRIN 81 MG: 81 TABLET, COATED ORAL at 09:56

## 2025-01-17 RX ADMIN — ACETAMINOPHEN 650 MG: 325 TABLET ORAL at 17:43

## 2025-01-17 RX ADMIN — AMLODIPINE BESYLATE 5 MG: 5 TABLET ORAL at 09:56

## 2025-01-17 RX ADMIN — ACETAMINOPHEN 650 MG: 325 TABLET ORAL at 05:36

## 2025-01-17 RX ADMIN — LAMOTRIGINE 150 MG: 100 TABLET ORAL at 22:42

## 2025-01-17 RX ADMIN — DEXAMETHASONE 4 MG: 4 TABLET ORAL at 09:57

## 2025-01-17 RX ADMIN — LITHIUM CARBONATE 150 MG: 300 TABLET ORAL at 09:57

## 2025-01-17 RX ADMIN — ATORVASTATIN CALCIUM 10 MG: 10 TABLET, FILM COATED ORAL at 09:57

## 2025-01-17 RX ADMIN — DOXAZOSIN 1 MG: 1 TABLET ORAL at 09:56

## 2025-01-17 RX ADMIN — ASPIRIN 81 MG: 81 TABLET, COATED ORAL at 22:41

## 2025-01-17 RX ADMIN — LEVOTHYROXINE SODIUM 150 MCG: 0.15 TABLET ORAL at 09:57

## 2025-01-17 ASSESSMENT — PAIN DESCRIPTION - DESCRIPTORS
DESCRIPTORS: ACHING;DISCOMFORT;SORE
DESCRIPTORS: ACHING;SORE;TENDER
DESCRIPTORS: ACHING

## 2025-01-17 ASSESSMENT — PAIN DESCRIPTION - PAIN TYPE
TYPE: ACUTE PAIN;SURGICAL PAIN
TYPE: ACUTE PAIN;SURGICAL PAIN

## 2025-01-17 ASSESSMENT — PAIN DESCRIPTION - ORIENTATION
ORIENTATION: RIGHT
ORIENTATION: RIGHT;MID

## 2025-01-17 ASSESSMENT — PAIN DESCRIPTION - LOCATION
LOCATION: KNEE
LOCATION: KNEE;INCISION

## 2025-01-17 ASSESSMENT — PAIN SCALES - GENERAL
PAINLEVEL_OUTOF10: 3
PAINLEVEL_OUTOF10: 8
PAINLEVEL_OUTOF10: 6

## 2025-01-17 ASSESSMENT — PAIN - FUNCTIONAL ASSESSMENT
PAIN_FUNCTIONAL_ASSESSMENT: PREVENTS OR INTERFERES SOME ACTIVE ACTIVITIES AND ADLS
PAIN_FUNCTIONAL_ASSESSMENT: ACTIVITIES ARE NOT PREVENTED
PAIN_FUNCTIONAL_ASSESSMENT: ACTIVITIES ARE NOT PREVENTED

## 2025-01-17 ASSESSMENT — PAIN DESCRIPTION - FREQUENCY
FREQUENCY: CONTINUOUS
FREQUENCY: INTERMITTENT

## 2025-01-17 NOTE — PLAN OF CARE
Problem: Pain  Goal: Verbalizes/displays adequate comfort level or baseline comfort level  Outcome: Progressing     Problem: Safety - Adult  Goal: Free from fall injury  Outcome: Progressing     Problem: Discharge Planning  Goal: Discharge to home or other facility with appropriate resources  Outcome: Progressing     Problem: Physical Therapy - Adult  Goal: By Discharge: Performs mobility at highest level of function for planned discharge setting.  See evaluation for individualized goals.  1/17/2025 1444 by Tamiko Grimes, PT  Outcome: Progressing  1/17/2025 1057 by Tamiko Grimes, PT  Outcome: Progressing     Problem: Occupational Therapy - Adult  Goal: By Discharge: Performs self-care activities at highest level of function for planned discharge setting.  See evaluation for individualized goals.  1/17/2025 1516 by Delores Long OT  Outcome: Progressing

## 2025-01-17 NOTE — PLAN OF CARE
Problem: Physical Therapy - Adult  Goal: By Discharge: Performs mobility at highest level of function for planned discharge setting.  See evaluation for individualized goals.  Description: FUNCTIONAL STATUS PRIOR TO ADMISSION: Patient was independent and active without use of DME.    HOME SUPPORT PRIOR TO ADMISSION: The patient lived with wife but did not require assistance.    Physical Therapy Goals  Initiated 1/16/2025  1.  Patient will move from supine to sit and sit to supine and roll side to side in bed with modified independence within 4 day(s).    2.  Patient will perform sit to stand with modified independence within 4 day(s).  3.  Patient will transfer from bed to chair and chair to bed with modified independence using the least restrictive device within 4 day(s).  4.  Patient will ambulate with modified independence for 150 feet with the least restrictive device within 4 day(s).   5.  Patient will ascend/descend 4 stairs with 1 handrail(s) with modified independence within 4 day(s).  6. Patient will perform TKA home exercise program per protocol with modified independence within 4 days.  7. Patient will demonstrate AROM 0-90 degrees in operative joint within 4 days.     Outcome: Progressing   PHYSICAL THERAPY TREATMENT    Patient: Andrea Chacon (74 y.o. male)  Date: 1/17/2025  Diagnosis: Arthritis of right knee [M17.11]  Osteoarthritis of right knee, unspecified osteoarthritis type [M17.11] Arthritis of right knee  Procedure(s) (LRB):  ROBOTIC RIGHT TOTAL KNEE ARTHROPLASTY (VELYS) (Right) 2 Days Post-Op  Precautions:                        ASSESSMENT:  Patient continues to benefit from skilled PT services and is slowly progressing towards goals. His mobility is somewhat improved from yesterday with more expected level of pain in the R knee.  However, he is confused and disoriented this morning and remained so throughout session.  He perseverated about tangential issues and noted posterior and R

## 2025-01-17 NOTE — CARE COORDINATION
LYDIA:    CM notified by Ortho NP and therapy that Pt is a good candidate for IPR and was requested to send referral.     CM met with Pt in room about choice in rehab facility. He lives near [place] and would like a referral sent to [facility]. CM placed referral in [facility].     Pt owns Walker and lives with wife in senior living apartment at [facility].     Transition of Care Plan:    RUR: 11%  Prior Level of Functioning: home with wife  Disposition: IPR  ROSALIA: 1/19 or later, pending bed availability at [facility] (which has had very limited beds in recent days)  If SNF or IPR: Date FOC offered: 1/17  Date FOC received: 1/17  Accepting facility:   Date authorization started with reference number:   Date authorization received and expires:   Follow up appointments: ipr  DME needed: ipr  Transportation at discharge: tbd  [facility] Medicare/ letter given: y, initial  Is patient a [employer] and connected with [facility]?    If yes, was  transfer form completed and [facility] notified?   Caregiver Contact: wife Piper Chacon [phone]   Discharge Caregiver contacted prior to discharge?   Care Conference needed?   Barriers to discharge:  acceptance at [facility], bed availability

## 2025-01-17 NOTE — PLAN OF CARE
Problem: Occupational Therapy - Adult  Goal: By Discharge: Performs self-care activities at highest level of function for planned discharge setting.  See evaluation for individualized goals.  Description: FUNCTIONAL STATUS PRIOR TO ADMISSION:  Patient lives with spouse in [facility]. He was active and independent, no assistive devices needed for ADL or mobility. Cognition largely intact with occasional mild memory issues per spouse' report    Occupational Therapy Goals:  Initiated 1/17/2025  1.  Patient will perform LB bathing with Minimal Assist within 7 day(s).  2.  Patient will perform lower body dressing with Minimal Assist within 7 day(s).  3.  Patient will perform grooming in stand with Contact Guard Assist within 7 day(s).  4.  Patient will perform toilet transfers with Stand by Assist  within 7 day(s).  5.  Patient will perform all aspects of toileting with Minimal Assist within 7 day(s).    Outcome: Progressing   OCCUPATIONAL THERAPY EVALUATION    Patient: Andrea Chacon (74 y.o. male)  Date: 1/17/2025  Primary Diagnosis: Arthritis of right knee [M17.11]  Osteoarthritis of right knee, unspecified osteoarthritis type [M17.11]  Procedure(s) (LRB):  ROBOTIC RIGHT TOTAL KNEE ARTHROPLASTY (VELYS) (Right) 2 Days Post-Op     Precautions: Fall Risk                  ASSESSMENT :  Patient is typically active and independent with ADL/IADL and mobility. Patient currently require mod to max assist for LB ADL and toileting, set up and supervision with cues required for UB ADL/grooming tasks. The patient is limited by decreased functional mobility, independence in ADLs, high-level IADLs, ROM, strength, body mechanics, activity tolerance, endurance, cognition, command following, attention/concentration, balance, increased pain levels. Post op delirium is complicating his recovery.   Based on the impairments listed above patient will benefit from OT services to maximize his ADL independence

## 2025-01-17 NOTE — PLAN OF CARE
Problem: Physical Therapy - Adult  Goal: By Discharge: Performs mobility at highest level of function for planned discharge setting.  See evaluation for individualized goals.  Description: FUNCTIONAL STATUS PRIOR TO ADMISSION: Patient was independent and active without use of DME.    HOME SUPPORT PRIOR TO ADMISSION: The patient lived with wife but did not require assistance.    Physical Therapy Goals  Initiated 1/16/2025  1.  Patient will move from supine to sit and sit to supine and roll side to side in bed with modified independence within 4 day(s).    2.  Patient will perform sit to stand with modified independence within 4 day(s).  3.  Patient will transfer from bed to chair and chair to bed with modified independence using the least restrictive device within 4 day(s).  4.  Patient will ambulate with modified independence for 150 feet with the least restrictive device within 4 day(s).   5.  Patient will ascend/descend 4 stairs with 1 handrail(s) with modified independence within 4 day(s).  6. Patient will perform TKA home exercise program per protocol with modified independence within 4 days.  7. Patient will demonstrate AROM 0-90 degrees in operative joint within 4 days.     1/17/2025 1444 by Tamiko Grimes, PT  Outcome: Progressing   PHYSICAL THERAPY TREATMENT    Patient: Andrea Chacon (74 y.o. male)  Date: 1/17/2025  Diagnosis: Arthritis of right knee [M17.11]  Osteoarthritis of right knee, unspecified osteoarthritis type [M17.11] Arthritis of right knee  Procedure(s) (LRB):  ROBOTIC RIGHT TOTAL KNEE ARTHROPLASTY (VELYS) (Right) 2 Days Post-Op  Precautions:                        ASSESSMENT:  Patient continues to benefit from skilled PT services and is progressing towards goals. He remains intermittently confused and tangential in his speech but oriented when asked orientation questions.  He did better with getting out of the bed to the left side, as he tends to perseverate about being left

## 2025-01-18 PROCEDURE — 97110 THERAPEUTIC EXERCISES: CPT

## 2025-01-18 PROCEDURE — 1100000000 HC RM PRIVATE

## 2025-01-18 PROCEDURE — 6370000000 HC RX 637 (ALT 250 FOR IP): Performed by: PHYSICIAN ASSISTANT

## 2025-01-18 PROCEDURE — 97116 GAIT TRAINING THERAPY: CPT

## 2025-01-18 PROCEDURE — 97530 THERAPEUTIC ACTIVITIES: CPT

## 2025-01-18 RX ADMIN — ASPIRIN 81 MG: 81 TABLET, COATED ORAL at 21:49

## 2025-01-18 RX ADMIN — FAMOTIDINE 20 MG: 20 TABLET, FILM COATED ORAL at 10:47

## 2025-01-18 RX ADMIN — AMLODIPINE BESYLATE 5 MG: 5 TABLET ORAL at 10:46

## 2025-01-18 RX ADMIN — ACETAMINOPHEN 650 MG: 325 TABLET ORAL at 07:30

## 2025-01-18 RX ADMIN — ASPIRIN 81 MG: 81 TABLET, COATED ORAL at 10:46

## 2025-01-18 RX ADMIN — POLYETHYLENE GLYCOL 3350 17 G: 17 POWDER, FOR SOLUTION ORAL at 10:45

## 2025-01-18 RX ADMIN — LITHIUM CARBONATE 150 MG: 300 TABLET ORAL at 10:46

## 2025-01-18 RX ADMIN — LEVOTHYROXINE SODIUM 150 MCG: 0.15 TABLET ORAL at 10:46

## 2025-01-18 RX ADMIN — LAMOTRIGINE 150 MG: 100 TABLET ORAL at 21:49

## 2025-01-18 RX ADMIN — SENNOSIDES AND DOCUSATE SODIUM 1 TABLET: 50; 8.6 TABLET ORAL at 10:46

## 2025-01-18 RX ADMIN — DOXAZOSIN 1 MG: 1 TABLET ORAL at 10:46

## 2025-01-18 RX ADMIN — QUETIAPINE FUMARATE 300 MG: 100 TABLET ORAL at 21:49

## 2025-01-18 RX ADMIN — ACETAMINOPHEN 650 MG: 325 TABLET ORAL at 21:49

## 2025-01-18 RX ADMIN — SENNOSIDES AND DOCUSATE SODIUM 1 TABLET: 50; 8.6 TABLET ORAL at 21:49

## 2025-01-18 RX ADMIN — ACETAMINOPHEN 650 MG: 325 TABLET ORAL at 18:53

## 2025-01-18 RX ADMIN — ATORVASTATIN CALCIUM 10 MG: 10 TABLET, FILM COATED ORAL at 10:46

## 2025-01-18 RX ADMIN — ACETAMINOPHEN 650 MG: 325 TABLET ORAL at 13:32

## 2025-01-18 RX ADMIN — LAMOTRIGINE 150 MG: 100 TABLET ORAL at 10:46

## 2025-01-18 ASSESSMENT — PAIN SCALES - GENERAL: PAINLEVEL_OUTOF10: 2

## 2025-01-18 ASSESSMENT — PAIN DESCRIPTION - LOCATION: LOCATION: KNEE

## 2025-01-18 ASSESSMENT — PAIN DESCRIPTION - ORIENTATION: ORIENTATION: RIGHT

## 2025-01-18 NOTE — PLAN OF CARE
Problem: Pain  Goal: Verbalizes/displays adequate comfort level or baseline comfort level  1/18/2025 0422 by Soila Penn RN  Outcome: Progressing  1/17/2025 2245 by Soila Penn RN  Outcome: Progressing  Flowsheets (Taken 1/17/2025 2245)  Verbalizes/displays adequate comfort level or baseline comfort level:   Encourage patient to monitor pain and request assistance   Administer analgesics based on type and severity of pain and evaluate response   Consider cultural and social influences on pain and pain management   Assess pain using appropriate pain scale   Implement non-pharmacological measures as appropriate and evaluate response   Notify Licensed Independent Practitioner if interventions unsuccessful or patient reports new pain  1/17/2025 1714 by Ila Lopez RN  Outcome: Progressing  Flowsheets  Taken 1/17/2025 1434  Verbalizes/displays adequate comfort level or baseline comfort level: Assess pain using appropriate pain scale  Taken 1/17/2025 0829  Verbalizes/displays adequate comfort level or baseline comfort level: Assess pain using appropriate pain scale     Problem: Safety - Adult  Goal: Free from fall injury  1/18/2025 0422 by Soila Penn RN  Outcome: Progressing  1/17/2025 1714 by Ila Lopez RN  Outcome: Progressing  Flowsheets (Taken 1/17/2025 0829)  Free From Fall Injury: Instruct family/caregiver on patient safety     Problem: Discharge Planning  Goal: Discharge to home or other facility with appropriate resources  1/18/2025 0422 by Soila Penn RN  Outcome: Progressing  1/17/2025 1714 by Ila Lopez RN  Outcome: Progressing  Flowsheets (Taken 1/17/2025 0829)  Discharge to home or other facility with appropriate resources: Identify barriers to discharge with patient and caregiver     Problem: Physical Therapy - Adult  Goal: By Discharge: Performs mobility at highest level of function for planned discharge setting.  See evaluation for individualized goals.  Description:

## 2025-01-18 NOTE — PLAN OF CARE
Problem: Physical Therapy - Adult  Goal: By Discharge: Performs mobility at highest level of function for planned discharge setting.  See evaluation for individualized goals.  Description: FUNCTIONAL STATUS PRIOR TO ADMISSION: Patient was independent and active without use of DME.    HOME SUPPORT PRIOR TO ADMISSION: The patient lived with wife but did not require assistance.    Physical Therapy Goals  Initiated 1/16/2025  1.  Patient will move from supine to sit and sit to supine and roll side to side in bed with modified independence within 4 day(s).    2.  Patient will perform sit to stand with modified independence within 4 day(s).  3.  Patient will transfer from bed to chair and chair to bed with modified independence using the least restrictive device within 4 day(s).  4.  Patient will ambulate with modified independence for 150 feet with the least restrictive device within 4 day(s).   5.  Patient will ascend/descend 4 stairs with 1 handrail(s) with modified independence within 4 day(s).  6. Patient will perform TKA home exercise program per protocol with modified independence within 4 days.  7. Patient will demonstrate AROM 0-90 degrees in operative joint within 4 days.     1/18/2025 1305 by Elizabeth Wright, PT  Outcome: Progressing   PHYSICAL THERAPY TREATMENT-AFTERNOON SESSION    Patient: Andrea Chacon (74 y.o. male)  Date: 1/18/2025  Diagnosis: Arthritis of right knee [M17.11]  Osteoarthritis of right knee, unspecified osteoarthritis type [M17.11] Arthritis of right knee  Procedure(s) (LRB):  ROBOTIC RIGHT TOTAL KNEE ARTHROPLASTY (VELYS) (Right) 3 Days Post-Op  Precautions: Fall Risk                    ASSESSMENT:  Patient continues to benefit from skilled PT services and is progressing towards goals. Patient is now ambulating 120 ft with contact guard assist using rolling walker.    Barrier(s) to discharge: Anticipate that patient will need 3 more PT sessions in order to

## 2025-01-18 NOTE — PLAN OF CARE
Problem: Physical Therapy - Adult  Goal: By Discharge: Performs mobility at highest level of function for planned discharge setting.  See evaluation for individualized goals.  Description: FUNCTIONAL STATUS PRIOR TO ADMISSION: Patient was independent and active without use of DME.    HOME SUPPORT PRIOR TO ADMISSION: The patient lived with wife but did not require assistance.    Physical Therapy Goals  Initiated 1/16/2025  1.  Patient will move from supine to sit and sit to supine and roll side to side in bed with modified independence within 4 day(s).    2.  Patient will perform sit to stand with modified independence within 4 day(s).  3.  Patient will transfer from bed to chair and chair to bed with modified independence using the least restrictive device within 4 day(s).  4.  Patient will ambulate with modified independence for 150 feet with the least restrictive device within 4 day(s).   5.  Patient will ascend/descend 4 stairs with 1 handrail(s) with modified independence within 4 day(s).  6. Patient will perform TKA home exercise program per protocol with modified independence within 4 days.  7. Patient will demonstrate AROM 0-90 degrees in operative joint within 4 days.     Outcome: Progressing   PHYSICAL THERAPY TREATMENT    Patient: Andrea Chacon (74 y.o. male)  Date: 1/18/2025  Diagnosis: Arthritis of right knee [M17.11]  Osteoarthritis of right knee, unspecified osteoarthritis type [M17.11] Arthritis of right knee  Procedure(s) (LRB):  ROBOTIC RIGHT TOTAL KNEE ARTHROPLASTY (VELYS) (Right) 3 Days Post-Op  Precautions: Fall Risk                      ASSESSMENT:  Patient continues to benefit from skilled PT services and is progressing towards goals. Patient received in bed and most agreeable to therapy.  Overall moving better this am with less assist to get to sitting EOB.  Continues with some posterior lean in sitting as well as standing.  Worked on exercises prior to OOB and while sitting.

## 2025-01-18 NOTE — PLAN OF CARE
Problem: Safety - Adult  Goal: Free from fall injury  1/17/2025 1714 by Ila Lopez RN  Outcome: Progressing  Flowsheets (Taken 1/17/2025 0829)  Free From Fall Injury: Instruct family/caregiver on patient safety     Problem: Pain  Goal: Verbalizes/displays adequate comfort level or baseline comfort level  1/17/2025 2245 by Soila Penn RN  Outcome: Progressing  Flowsheets (Taken 1/17/2025 2245)  Verbalizes/displays adequate comfort level or baseline comfort level:   Encourage patient to monitor pain and request assistance   Administer analgesics based on type and severity of pain and evaluate response   Consider cultural and social influences on pain and pain management   Assess pain using appropriate pain scale   Implement non-pharmacological measures as appropriate and evaluate response   Notify Licensed Independent Practitioner if interventions unsuccessful or patient reports new pain  1/17/2025 1714 by Ila Lopez RN  Outcome: Progressing  Flowsheets  Taken 1/17/2025 1434  Verbalizes/displays adequate comfort level or baseline comfort level: Assess pain using appropriate pain scale  Taken 1/17/2025 0829  Verbalizes/displays adequate comfort level or baseline comfort level: Assess pain using appropriate pain scale

## 2025-01-19 PROCEDURE — 6370000000 HC RX 637 (ALT 250 FOR IP): Performed by: PHYSICIAN ASSISTANT

## 2025-01-19 PROCEDURE — 1100000000 HC RM PRIVATE

## 2025-01-19 PROCEDURE — 51702 INSERT TEMP BLADDER CATH: CPT

## 2025-01-19 PROCEDURE — 97116 GAIT TRAINING THERAPY: CPT

## 2025-01-19 PROCEDURE — 97530 THERAPEUTIC ACTIVITIES: CPT

## 2025-01-19 PROCEDURE — 6370000000 HC RX 637 (ALT 250 FOR IP): Performed by: NURSE PRACTITIONER

## 2025-01-19 RX ADMIN — FAMOTIDINE 20 MG: 20 TABLET, FILM COATED ORAL at 09:55

## 2025-01-19 RX ADMIN — ASPIRIN 81 MG: 81 TABLET, COATED ORAL at 20:53

## 2025-01-19 RX ADMIN — ACETAMINOPHEN 650 MG: 325 TABLET ORAL at 16:28

## 2025-01-19 RX ADMIN — POLYETHYLENE GLYCOL 3350 17 G: 17 POWDER, FOR SOLUTION ORAL at 10:02

## 2025-01-19 RX ADMIN — LAMOTRIGINE 150 MG: 100 TABLET ORAL at 10:02

## 2025-01-19 RX ADMIN — OXYCODONE HYDROCHLORIDE 5 MG: 5 TABLET ORAL at 18:08

## 2025-01-19 RX ADMIN — ASPIRIN 81 MG: 81 TABLET, COATED ORAL at 09:55

## 2025-01-19 RX ADMIN — DOXAZOSIN 1 MG: 1 TABLET ORAL at 09:53

## 2025-01-19 RX ADMIN — LITHIUM CARBONATE 150 MG: 300 TABLET ORAL at 09:55

## 2025-01-19 RX ADMIN — ACETAMINOPHEN 650 MG: 325 TABLET ORAL at 06:01

## 2025-01-19 RX ADMIN — SENNOSIDES AND DOCUSATE SODIUM 1 TABLET: 50; 8.6 TABLET ORAL at 09:54

## 2025-01-19 RX ADMIN — LEVOTHYROXINE SODIUM 150 MCG: 0.15 TABLET ORAL at 09:55

## 2025-01-19 RX ADMIN — AMLODIPINE BESYLATE 5 MG: 5 TABLET ORAL at 09:55

## 2025-01-19 RX ADMIN — QUETIAPINE FUMARATE 300 MG: 100 TABLET ORAL at 20:52

## 2025-01-19 RX ADMIN — LAMOTRIGINE 150 MG: 100 TABLET ORAL at 20:53

## 2025-01-19 RX ADMIN — ACETAMINOPHEN 650 MG: 325 TABLET ORAL at 09:53

## 2025-01-19 RX ADMIN — SENNOSIDES AND DOCUSATE SODIUM 1 TABLET: 50; 8.6 TABLET ORAL at 20:53

## 2025-01-19 RX ADMIN — ATORVASTATIN CALCIUM 10 MG: 10 TABLET, FILM COATED ORAL at 09:55

## 2025-01-19 RX ADMIN — HYDROXYZINE HYDROCHLORIDE 10 MG: 10 TABLET ORAL at 20:52

## 2025-01-19 ASSESSMENT — PAIN SCALES - GENERAL
PAINLEVEL_OUTOF10: 2
PAINLEVEL_OUTOF10: 8

## 2025-01-19 ASSESSMENT — PAIN DESCRIPTION - LOCATION
LOCATION: KNEE;LEG
LOCATION: KNEE
LOCATION: KNEE

## 2025-01-19 ASSESSMENT — PAIN DESCRIPTION - ORIENTATION
ORIENTATION: RIGHT

## 2025-01-19 ASSESSMENT — PAIN DESCRIPTION - DESCRIPTORS
DESCRIPTORS: ACHING
DESCRIPTORS: ACHING

## 2025-01-19 ASSESSMENT — PAIN - FUNCTIONAL ASSESSMENT: PAIN_FUNCTIONAL_ASSESSMENT: ACTIVITIES ARE NOT PREVENTED

## 2025-01-19 NOTE — PLAN OF CARE
Problem: Physical Therapy - Adult  Goal: By Discharge: Performs mobility at highest level of function for planned discharge setting.  See evaluation for individualized goals.  Description: FUNCTIONAL STATUS PRIOR TO ADMISSION: Patient was independent and active without use of DME.    HOME SUPPORT PRIOR TO ADMISSION: The patient lived with wife but did not require assistance.    Physical Therapy Goals  Initiated 1/16/2025  1.  Patient will move from supine to sit and sit to supine and roll side to side in bed with modified independence within 4 day(s).    2.  Patient will perform sit to stand with modified independence within 4 day(s).  3.  Patient will transfer from bed to chair and chair to bed with modified independence using the least restrictive device within 4 day(s).  4.  Patient will ambulate with modified independence for 150 feet with the least restrictive device within 4 day(s).   5.  Patient will ascend/descend 4 stairs with 1 handrail(s) with modified independence within 4 day(s).  6. Patient will perform TKA home exercise program per protocol with modified independence within 4 days.  7. Patient will demonstrate AROM 0-90 degrees in operative joint within 4 days.     Outcome: Progressing   PHYSICAL THERAPY TREATMENT    Patient: Andrea Chacon (74 y.o. male)  Date: 1/19/2025  Diagnosis: Arthritis of right knee [M17.11]  Osteoarthritis of right knee, unspecified osteoarthritis type [M17.11] Arthritis of right knee  Procedure(s) (LRB):  ROBOTIC RIGHT TOTAL KNEE ARTHROPLASTY (VELYS) (Right) 4 Days Post-Op  Precautions: Fall Risk                      ASSESSMENT:  Patient continues to benefit from skilled PT services and is slowly progressing towards goals. Pt confused and required repeated re-directions to complete task.  Pt amb in hallway with RW - 150 feet.           PLAN:  Patient continues to benefit from skilled intervention to address the above impairments.  Continue treatment per established

## 2025-01-19 NOTE — PLAN OF CARE
Problem: Physical Therapy - Adult  Goal: By Discharge: Performs mobility at highest level of function for planned discharge setting.  See evaluation for individualized goals.  Description: FUNCTIONAL STATUS PRIOR TO ADMISSION: Patient was independent and active without use of DME.    HOME SUPPORT PRIOR TO ADMISSION: The patient lived with wife but did not require assistance.    Physical Therapy Goals  Initiated 1/16/2025  1.  Patient will move from supine to sit and sit to supine and roll side to side in bed with modified independence within 4 day(s).    2.  Patient will perform sit to stand with modified independence within 4 day(s).  3.  Patient will transfer from bed to chair and chair to bed with modified independence using the least restrictive device within 4 day(s).  4.  Patient will ambulate with modified independence for 150 feet with the least restrictive device within 4 day(s).   5.  Patient will ascend/descend 4 stairs with 1 handrail(s) with modified independence within 4 day(s).  6. Patient will perform TKA home exercise program per protocol with modified independence within 4 days.  7. Patient will demonstrate AROM 0-90 degrees in operative joint within 4 days.     1/19/2025 0920 by Eliza Lee, PT  Outcome: Progressing   PHYSICAL THERAPY TREATMENT-AFTERNOON SESSION    Patient: Andrea Chacon (74 y.o. male)  Date: 1/19/2025  Diagnosis: Arthritis of right knee [M17.11]  Osteoarthritis of right knee, unspecified osteoarthritis type [M17.11] Arthritis of right knee  Procedure(s) (LRB):  ROBOTIC RIGHT TOTAL KNEE ARTHROPLASTY (VELYS) (Right) 4 Days Post-Op  Precautions: Weight Bearing, Fall Risk Right Lower Extremity Weight Bearing: Weight Bearing As Tolerated                  ASSESSMENT:  Patient continues to benefit from skilled PT services and is slowly progressing towards goals. Patient is now ambulating 75 - 120  ft with contact guard assist using rolling walker.  Pt performing seated TKR

## 2025-01-19 NOTE — PLAN OF CARE
Problem: Pain  Goal: Verbalizes/displays adequate comfort level or baseline comfort level  1/19/2025 1326 by Hermelinda Méndez RN  Outcome: Progressing  1/19/2025 0303 by Nicolle Manriquez RN  Outcome: Progressing     Problem: Safety - Adult  Goal: Free from fall injury  1/19/2025 1326 by Hermelinda Méndez RN  Outcome: Progressing  1/19/2025 0303 by Nicolle Manriquez RN  Outcome: Progressing     Problem: Discharge Planning  Goal: Discharge to home or other facility with appropriate resources  Outcome: Progressing     Problem: Physical Therapy - Adult  Goal: By Discharge: Performs mobility at highest level of function for planned discharge setting.  See evaluation for individualized goals.  Description: FUNCTIONAL STATUS PRIOR TO ADMISSION: Patient was independent and active without use of DME.    HOME SUPPORT PRIOR TO ADMISSION: The patient lived with wife but did not require assistance.    Physical Therapy Goals  Initiated 1/16/2025  1.  Patient will move from supine to sit and sit to supine and roll side to side in bed with modified independence within 4 day(s).    2.  Patient will perform sit to stand with modified independence within 4 day(s).  3.  Patient will transfer from bed to chair and chair to bed with modified independence using the least restrictive device within 4 day(s).  4.  Patient will ambulate with modified independence for 150 feet with the least restrictive device within 4 day(s).   5.  Patient will ascend/descend 4 stairs with 1 handrail(s) with modified independence within 4 day(s).  6. Patient will perform TKA home exercise program per protocol with modified independence within 4 days.  7. Patient will demonstrate AROM 0-90 degrees in operative joint within 4 days.     1/19/2025 0920 by Eliza Lee, PT  Outcome: Progressing     Problem: Skin/Tissue Integrity  Goal: Absence of new skin breakdown  Description: 1.  Monitor for areas of redness and/or skin breakdown  2.  Assess vascular

## 2025-01-20 VITALS
DIASTOLIC BLOOD PRESSURE: 59 MMHG | SYSTOLIC BLOOD PRESSURE: 130 MMHG | RESPIRATION RATE: 16 BRPM | HEIGHT: 74 IN | HEART RATE: 72 BPM | TEMPERATURE: 98.1 F | WEIGHT: 195 LBS | OXYGEN SATURATION: 98 % | BODY MASS INDEX: 25.03 KG/M2

## 2025-01-20 PROCEDURE — 51702 INSERT TEMP BLADDER CATH: CPT

## 2025-01-20 PROCEDURE — 6370000000 HC RX 637 (ALT 250 FOR IP): Performed by: PHYSICIAN ASSISTANT

## 2025-01-20 PROCEDURE — 97116 GAIT TRAINING THERAPY: CPT

## 2025-01-20 PROCEDURE — 97110 THERAPEUTIC EXERCISES: CPT

## 2025-01-20 RX ORDER — ASPIRIN 81 MG/1
81 TABLET, CHEWABLE ORAL 2 TIMES DAILY
Qty: 60 TABLET | Refills: 0 | Status: SHIPPED | OUTPATIENT
Start: 2025-01-20

## 2025-01-20 RX ORDER — BISACODYL 10 MG
10 SUPPOSITORY, RECTAL RECTAL DAILY PRN
Status: DISCONTINUED | OUTPATIENT
Start: 2025-01-20 | End: 2025-01-20 | Stop reason: HOSPADM

## 2025-01-20 RX ORDER — BISACODYL 10 MG
10 SUPPOSITORY, RECTAL RECTAL DAILY
Status: DISCONTINUED | OUTPATIENT
Start: 2025-01-20 | End: 2025-01-20

## 2025-01-20 RX ORDER — OXYCODONE HYDROCHLORIDE 5 MG/1
5 TABLET ORAL EVERY 8 HOURS PRN
Qty: 21 TABLET | Refills: 0 | Status: SHIPPED | OUTPATIENT
Start: 2025-01-20 | End: 2025-01-27

## 2025-01-20 RX ORDER — DEXAMETHASONE 4 MG/1
4 TABLET ORAL
Qty: 4 TABLET | Refills: 0 | Status: SHIPPED | OUTPATIENT
Start: 2025-01-20 | End: 2025-01-24

## 2025-01-20 RX ADMIN — SENNOSIDES AND DOCUSATE SODIUM 1 TABLET: 50; 8.6 TABLET ORAL at 11:03

## 2025-01-20 RX ADMIN — FAMOTIDINE 20 MG: 20 TABLET, FILM COATED ORAL at 11:04

## 2025-01-20 RX ADMIN — DOXAZOSIN 1 MG: 1 TABLET ORAL at 11:04

## 2025-01-20 RX ADMIN — LITHIUM CARBONATE 150 MG: 300 TABLET ORAL at 11:03

## 2025-01-20 RX ADMIN — ACETAMINOPHEN 650 MG: 325 TABLET ORAL at 11:03

## 2025-01-20 RX ADMIN — ATORVASTATIN CALCIUM 10 MG: 10 TABLET, FILM COATED ORAL at 11:04

## 2025-01-20 RX ADMIN — LAMOTRIGINE 150 MG: 100 TABLET ORAL at 11:03

## 2025-01-20 RX ADMIN — ACETAMINOPHEN 650 MG: 325 TABLET ORAL at 00:05

## 2025-01-20 RX ADMIN — ASPIRIN 81 MG: 81 TABLET, COATED ORAL at 11:02

## 2025-01-20 RX ADMIN — AMLODIPINE BESYLATE 5 MG: 5 TABLET ORAL at 11:04

## 2025-01-20 RX ADMIN — POLYETHYLENE GLYCOL 3350 17 G: 17 POWDER, FOR SOLUTION ORAL at 11:02

## 2025-01-20 RX ADMIN — LEVOTHYROXINE SODIUM 150 MCG: 0.15 TABLET ORAL at 11:03

## 2025-01-20 RX ADMIN — ACETAMINOPHEN 650 MG: 325 TABLET ORAL at 06:40

## 2025-01-20 ASSESSMENT — PAIN SCALES - GENERAL
PAINLEVEL_OUTOF10: 5
PAINLEVEL_OUTOF10: 3

## 2025-01-20 ASSESSMENT — PAIN DESCRIPTION - ORIENTATION
ORIENTATION: RIGHT
ORIENTATION: RIGHT

## 2025-01-20 ASSESSMENT — PAIN DESCRIPTION - DESCRIPTORS
DESCRIPTORS: ACHING;SORE
DESCRIPTORS: ACHING;SORE

## 2025-01-20 ASSESSMENT — PAIN DESCRIPTION - LOCATION
LOCATION: KNEE
LOCATION: KNEE

## 2025-01-20 NOTE — PLAN OF CARE
Problem: Pain  Goal: Verbalizes/displays adequate comfort level or baseline comfort level  1/20/2025 1447 by Ila Lopez RN  Outcome: Progressing  1/20/2025 1447 by Ila Lopez RN  Reactivated  1/20/2025 1346 by Anthony Wilson RN  Outcome: Adequate for Discharge  1/20/2025 0057 by Soila Penn RN  Outcome: Progressing     Problem: Safety - Adult  Goal: Free from fall injury  1/20/2025 1447 by Ila Lopez RN  Outcome: Progressing  1/20/2025 1447 by Ila Lopez RN  Reactivated  1/20/2025 1346 by Anthony Wilson RN  Outcome: Adequate for Discharge  1/20/2025 0057 by Soila Penn RN  Outcome: Progressing     Problem: Discharge Planning  Goal: Discharge to home or other facility with appropriate resources  1/20/2025 1447 by Ila Lopez RN  Outcome: Progressing  1/20/2025 1447 by Ila Lopez RN  Reactivated  1/20/2025 1346 by Anthony Wilson RN  Outcome: Adequate for Discharge  1/20/2025 0057 by Soila Penn RN  Outcome: Progressing     Problem: Physical Therapy - Adult  Goal: By Discharge: Performs mobility at highest level of function for planned discharge setting.  See evaluation for individualized goals.  1/20/2025 1346 by Anthony Wilson RN  Outcome: Adequate for Discharge  1/20/2025 1142 by Jerzy Green, ANNABEL  Outcome: Progressing     Problem: Occupational Therapy - Adult  Goal: By Discharge: Performs self-care activities at highest level of function for planned discharge setting.  See evaluation for individualized goals.  1/20/2025 1346 by Anthony Wilson RN  Outcome: Adequate for Discharge     Problem: Skin/Tissue Integrity  Goal: Absence of new skin breakdown  1/20/2025 1447 by Ila Lopez RN  Outcome: Progressing  1/20/2025 1447 by Ila Lopez RN  Reactivated  1/20/2025 1346 by Anthony Wilson RN  Outcome: Adequate for Discharge  1/20/2025 0057 by Awuni, Soila, RN  Outcome: Progressing

## 2025-01-20 NOTE — PLAN OF CARE
Problem: Pain  Goal: Verbalizes/displays adequate comfort level or baseline comfort level  1/20/2025 0057 by Soila Penn RN  Outcome: Progressing  1/19/2025 1333 by Hermelinda Méndez RN  Outcome: Progressing  1/19/2025 1326 by Hermelinda Méndez RN  Outcome: Progressing     Problem: Safety - Adult  Goal: Free from fall injury  1/20/2025 0057 by Soila Penn RN  Outcome: Progressing  1/19/2025 1333 by Hermelinda Méndez RN  Outcome: Progressing  1/19/2025 1326 by Hermelinda Méndez RN  Outcome: Progressing     Problem: Discharge Planning  Goal: Discharge to home or other facility with appropriate resources  1/20/2025 0057 by Soila Penn RN  Outcome: Progressing  1/19/2025 1333 by Hermelinda Méndez RN  Outcome: Progressing  1/19/2025 1326 by Hermelinda Méndez RN  Outcome: Progressing     Problem: Physical Therapy - Adult  Goal: By Discharge: Performs mobility at highest level of function for planned discharge setting.  See evaluation for individualized goals.  Description: FUNCTIONAL STATUS PRIOR TO ADMISSION: Patient was independent and active without use of DME.    HOME SUPPORT PRIOR TO ADMISSION: The patient lived with wife but did not require assistance.    Physical Therapy Goals  Initiated 1/16/2025  1.  Patient will move from supine to sit and sit to supine and roll side to side in bed with modified independence within 4 day(s).    2.  Patient will perform sit to stand with modified independence within 4 day(s).  3.  Patient will transfer from bed to chair and chair to bed with modified independence using the least restrictive device within 4 day(s).  4.  Patient will ambulate with modified independence for 150 feet with the least restrictive device within 4 day(s).   5.  Patient will ascend/descend 4 stairs with 1 handrail(s) with modified independence within 4 day(s).  6. Patient will perform TKA home exercise program per protocol with modified independence within 4 days.  7. Patient will demonstrate

## 2025-01-20 NOTE — PROGRESS NOTES
Pharmacy Note - Renal dose adjustment made per P/T protocol    Original order:  Famotidine 20 mg twice daily    Estimated Creatinine Clearance: 43 mL/min (A) (based on SCr of 1.74 mg/dL (H)).        Renally adjusted order:  Famotidine 20 mg once daily    Please call pharmacy with any questions.    Thank you,  Shyam Gregorio [facility]  1/15/2025 4:16 PM   
 pt is complaining of new right calf pain. Upon assessment, calf is negative for redness, swelling, and heat. Provider made aware. Pain medicine given (refer to eMAR).   
25 patient completed online education. no questions at this time.     KNEE DISABILITY OSTEOARTHRITIS AND OUTCOME SCORE    Stiffness - The following question concerns the amount of joing stiffness you have experienced during the last week in your knee. Stiffness is a sensation of restriction or slowness in the ease with which you move your knee joint.  How severe is your knee stiffness after first wakening in the morning?: 2        Pain - What amount of knee pain have you experienced the last week during the following activities?  Twisting/pivoting on your knee: 2  Straightening knee fully: 2  Going up or down stairs: 2  Standing upright: 2        Function - Please indicate the degree of difficulty you have experienced in the last week due to your knee.  Rising from sittin  Bending to floor/ an object: 3        Raw Score  Jr CARMINA. Knee Survey Score: 15  KOOS JR Total Interval Score (0-100 Scale): 50.012      PROMIS Questions    In general, would you say your health is:: 3    In general, would you say your quality of life is:: 3    In general, how would you rate your physical health?: 3    In general, how would you rate your mental health, including your mood and your ability to think?: 2    To what extent are you able to carry out your everyday physical activities such as walking, climbing stairs, carrying groceries, or moving a chair?: 2    In the past 7 days how often have you been bothered by emotional problems such as feeling anxious, depressed or irritable?: 3    In the past 7 days how would you rate your fatigue on average?: 2    In the past 7 days how would you rate your pain on average?: 7    In general, please rate how well you carry out your usual social activities and roles. (This includes activities at home, at work and in your community, and responsibilities as a parent, child, spouse, employee, friend, etc.): 3    In general, how would you rate your satisfaction with your social 
Bladder scan at 4 pm and he was retaining 1008 ml. NP aware and give verbal order to insert a coude Flynn catheter.  
OT order received, chart reviewed. Patient with increased confusion/delirium  this AM. Will hold OT evaluation this morning and follow up later  Delores Long OTR/L  
Occupational Therapy Note:     Attempted to see pt for evaluation.  Pt resting in bed but continues to report increased amounts of pain.  Repositioned ice to improve comfort.      Karely Arredondo, OTR/L       
Ortho NP Note    POD# 2  s/p ROBOTIC RIGHT TOTAL KNEE ARTHROPLASTY (VELYS)   Pt seen with wife at bedside.     Pt resting in bed. Pleasant and talkative today however confused with hallucinations. Aware it is day time and he is in the hospital but unaware of recent surgery.   She reports some memory issues but no significant confusion PTA.   He did not sleep much the first night, slept several hrs during the day and was awake most of last night. Likely has some hospital delirium worsening baseline confusion, narcotics could also be contributing.   Issue with pain control, nausea and urinary retention since surgery   Currently he does not have complaints of pain. Wife unsure if hx of intolerance to pain medications in past.   Flynn in place for bladder rest. Denies any issues voiding PTA or known hx of BPH.     Reviewed plan today to avoid/limit narcotics, encourage day/night wake cycle, mobilize with therapy and attempt voiding trial.       VSS Afebrile.    Visit Vitals  BP (!) 144/83   Pulse 81   Temp 97.3 °F (36.3 °C) (Oral)   Resp 18   Ht 1.88 m (6' 2\")   Wt 88.5 kg (195 lb)   SpO2 97%   BMI 25.04 kg/m²       Voiding status: acute retention - Flynn         Labs    Lab Results   Component Value Date/Time    HGB 13.6 01/16/2025 03:49 AM      Lab Results   Component Value Date/Time    INR 1.0 01/08/2025 01:35 PM      Lab Results   Component Value Date/Time     01/16/2025 03:49 AM    K 4.6 01/16/2025 03:49 AM     01/16/2025 03:49 AM    CO2 22 01/16/2025 03:49 AM    BUN 21 01/16/2025 03:49 AM     Recent Glucose Results:   Glucose   Date Value Ref Range Status   01/16/2025 139 (H) 65 - 100 mg/dL Final   01/08/2025 103 (H) 65 - 100 mg/dL Final   12/16/2024 91 70 - 99 mg/dL Final   06/11/2024 92 70 - 99 mg/dL Final   12/20/2023 116 (H) 70 - 99 mg/dL Final     Cr 1.66, close to baseline Cr 1.74      Body mass index is 25.04 kg/m². : A BMI > 30 is classified as obesity and > 40 is classified as morbid obesity. 
Orthopaedics Daily Progress Note                            Date of Surgery:  1/15/2025    Patient: Andrea Chacon   YOB: 1950  Age: 74 y.o.      SUBJECTIVE:   3 Days Post-Op following ROBOTIC RIGHT TOTAL KNEE ARTHROPLASTY (VELYS).      The patient's post operative pain is controlled.  No CP/SOB.  No N/V. The patient's mobility will be evaluated today during PT sessions.    Continues to be quite confused throughout visit, unsure of month/year, still \"awaiting surgical plan\" / not aware that surgery was done already.      OBJECTIVE:     Vital Signs:    BP (!) 137/57   Pulse 82   Temp 98.6 °F (37 °C) (Oral)   Resp 18   Ht 1.88 m (6' 2\")   Wt 88.5 kg (195 lb)   SpO2 97%   BMI 25.04 kg/m²     Physical Exam:  General: Alert. The patient is cooperative, and in no acute distress.  Pleasant and talkative but only oriented to person  Respiratory: Respirations are unlabored.  Surgical site(s): dressing clean, dry  Musculoskeletal: Calves are soft, supple, and non-tender upon palpation.     Laboratory Values:             No results found for this or any previous visit (from the past 12 hour(s)).      PLAN:     S/P ROBOTIC RIGHT TOTAL KNEE ARTHROPLASTY (VELYS) -Continue WBAT.  -Mobilize and continue with PT/OT until discharged  Flynn still in place, consider voiding trial      Hemodynamics Acute blood loss anemia as expected. Patient asymptomatic.  Continue to monitor.     Wound Monitor postop dressing; no postop dressing changes necessary.  Reinforce PRN.     Post Operative Pain Pain Control:  limit narcotics, tolerating Tylenol .     DVT Prophylaxis Continue with SCD'S, Ankle Pump Exercises. ASA 81mg BID     Discharge Disposition Discharge plan: pending, likely rehab.       Signed By: Shoshana Kim PA-C  January 18, 2025 9:28 AM   
Orthopaedics Daily Progress Note                            Date of Surgery:  1/15/2025    Patient: Andrea Chacon   YOB: 1950  Age: 74 y.o.      SUBJECTIVE:   4 Days Post-Op following ROBOTIC RIGHT TOTAL KNEE ARTHROPLASTY (VELYS).      The patient's post operative pain is controlled.  No CP/SOB.  No N/V. The patient's mobility will be evaluated today during PT sessions.    Seems more confused than yesterday.      OBJECTIVE:     Vital Signs:    /60   Pulse 76   Temp 98.1 °F (36.7 °C) (Oral)   Resp 18   Ht 1.88 m (6' 2\")   Wt 88.5 kg (195 lb)   SpO2 97%   BMI 25.04 kg/m²     Physical Exam:  General: Alert. The patient is cooperative, and in no acute distress.  Pleasant and talkative but only oriented to person  Respiratory: Respirations are unlabored.  Surgical site(s): incision clean/dry, no dressing in place  Musculoskeletal: Calves are soft, supple, and non-tender upon palpation.     Laboratory Values:             No results found for this or any previous visit (from the past 12 hour(s)).      PLAN:     S/P ROBOTIC RIGHT TOTAL KNEE ARTHROPLASTY (VELYS) -Continue WBAT.  -Mobilize and continue with PT/OT until discharged       Hemodynamics Acute blood loss anemia as expected. Patient asymptomatic.  Continue to monitor.     Wound Monitor postop dressing; no postop dressing changes necessary.  Reinforce PRN.     Post Operative Pain Pain Control:  limit narcotics, tolerating Tylenol .     DVT Prophylaxis Continue with SCD'S, Ankle Pump Exercises. ASA 81mg BID     Discharge Disposition Discharge plan: pending, likely rehab.       Signed By: Shoshana Kim PA-C  January 19, 2025 8:34 AM   
Orthopaedics Daily Progress Note                            Date of Surgery:  1/15/2025    Patient: Andrea Chacon   YOB: 1950  Age: 74 y.o.      SUBJECTIVE:   5 Days Post-Op following ROBOTIC RIGHT TOTAL KNEE ARTHROPLASTY (VELYS).      The patient's post operative pain is controlled.  No CP/SOB.  No N/V. The patient's mobility is doing great, cleared by PT    Alert and oriented today. Doing very well.     OBJECTIVE:     Vital Signs:    BP (!) 130/59   Pulse 72   Temp 98.1 °F (36.7 °C) (Oral)   Resp 16   Ht 1.88 m (6' 2\")   Wt 88.5 kg (195 lb)   SpO2 98%   BMI 25.04 kg/m²     Physical Exam:  General: Alert. The patient is cooperative, and in no acute distress.  Pleasant and talkative but only oriented to person  Respiratory: Respirations are unlabored.  Surgical site(s): incision clean/dry, no dressing in place  Musculoskeletal: Calves are soft, supple, and non-tender upon palpation.     Laboratory Values:             No results found for this or any previous visit (from the past 12 hour(s)).      PLAN:     S/P ROBOTIC RIGHT TOTAL KNEE ARTHROPLASTY (VELYS) -Continue WBAT.  -Mobilize and continue with PT/OT until discharged      -BM overnight    Urinary retention - will need to follow up out patient with urology. Patients wife to Contact [facility] at [phone] for follow up for lopez follow up. Contact information in discharge instruction.      Hemodynamics Acute blood loss anemia as expected. Patient asymptomatic.  Continue to monitor.     Wound Monitor postop dressing; no postop dressing changes necessary.  Reinforce PRN.     Post Operative Pain Pain Control:  limit narcotics, tolerating Tylenol .     DVT Prophylaxis Continue with SCD'S, Ankle Pump Exercises. ASA 81mg BID     Discharge Disposition Discharge plan: home goal for today with HHPT       Signed By: Magalie Alvarez PA-C  January 20, 2025 9:50 AM   
PT order acknowledged and chart reviewed in prep for PT eval.  Pt reporting pain 9/10 - also shivering --- nurse aware and giving IV Dilaudid.  Will defer PT secondary to pain - nursing to mobilize as appropriate.    Eliza Lee, PT   
Physical Therapy    Attempted to see patient for follow up therapy session, but he is continuing to struggle with pain and now nausea, having vomited almost immediately after taking pain meds. Discussed with RN and NP and we will follow up tomorrow.  Given his mobility earlier today, recommend he get up to the edge of the bed with nursing this evening once his nausea and pain are better controlled.    Tamiko Grimes, PT    
Pt sat at edge of bed and tried to urinate, however was unable.  
Reviewed discharge instruction with patient and spouse and verbalized understanding. And also provide teaching about how to change leg bag and patient verbalized understanding.   
Prophylaxis. Encouraged early mobilization, bed exercises, and SCD use.  3) GI Prophylaxis - Pepcid    4) Pain control - scheduled tylenol, and prn  oxycodone 5-10 mg. Will give next oxycodone in 3 hours and start Decadron 4 mg BID.   5) Acute urinary retention - Straight cath x2 (900ml, 550). Able to void once between caths for 200ml. Hopefully bladder function will return given time from surgery and increasing activity today. Continue bladder checks.   6) Post op care - Continue bowel regimen, encouraged IS. Straight cath per protocol. Prineo dressing to remain in place until follow up unless integrity is lost.    7) Readniess for discharge:     [x] Vital Signs stable    [x] Hgb stable    [] + Voiding    [x] Wound intact, drainage minimal    [x] Tolerating PO intake     [] Cleared by PT (OT if applicable)     [] Stair training completed (if applicable)    [] Independent / Contact Guard Assist (household distance)     [] Bed mobility     [] Car transfers     [] ADL’s    [] Adequate pain control on oral medication alone     Discharge planning - plans to return home with HH & wife's support.     GLENYS Johnston - NP

## 2025-01-20 NOTE — CARE COORDINATION
LYDIA:    Pt denied for IPR but made good progress over the weekend. Home today with HH and wife. CM met with Pt, confirmed plan, reviewedd 2nd IMM letter, and updated HH referral to include SN for potential lopez care. Wife to transport.     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     Transition of Care Plan:    RUR: 10%  Prior Level of Functioning: home with wife  Disposition: home with HH  ROSALIA: 1/20  If SNF or IPR: Date FOC offered: 1/17  Date FOC received: 1/17  Accepting facility:   Date authorization started with reference number:   Date authorization received and expires:   Follow up appointments: ipr  DME needed: ipr  Transportation at discharge: tbd  IM/IMM Medicare/ letter given: y, initial  Is patient a [place] and connected with [facility]?    If yes, was [place] transfer form completed and [facility] notified?   Caregiver Contact: wife Piper Chacon [phone]   Discharge Caregiver contacted prior to discharge?   Care Conference needed?   Barriers to discharge:  none

## 2025-01-20 NOTE — PLAN OF CARE
Problem: Physical Therapy - Adult  Goal: By Discharge: Performs mobility at highest level of function for planned discharge setting.  See evaluation for individualized goals.  Description: FUNCTIONAL STATUS PRIOR TO ADMISSION: Patient was independent and active without use of DME.    HOME SUPPORT PRIOR TO ADMISSION: The patient lived with wife but did not require assistance.    Physical Therapy Goals  Initiated 1/16/2025  1.  Patient will move from supine to sit and sit to supine and roll side to side in bed with modified independence within 4 day(s).    2.  Patient will perform sit to stand with modified independence within 4 day(s).  3.  Patient will transfer from bed to chair and chair to bed with modified independence using the least restrictive device within 4 day(s).  4.  Patient will ambulate with modified independence for 150 feet with the least restrictive device within 4 day(s).   5.  Patient will ascend/descend 4 stairs with 1 handrail(s) with modified independence within 4 day(s).  6. Patient will perform TKA home exercise program per protocol with modified independence within 4 days.  7. Patient will demonstrate AROM 0-90 degrees in operative joint within 4 days.     Outcome: Progressing       PHYSICAL THERAPY TREATMENT    Patient: Andrea Chacon (74 y.o. male)  Date: 1/20/2025  Diagnosis: Arthritis of right knee [M17.11]  Osteoarthritis of right knee, unspecified osteoarthritis type [M17.11] Arthritis of right knee  Procedure(s) (LRB):  ROBOTIC RIGHT TOTAL KNEE ARTHROPLASTY (VELYS) (Right) 5 Days Post-Op  Precautions: Weight Bearing, Fall Risk Right Lower Extremity Weight Bearing: Weight Bearing As Tolerated                    ASSESSMENT:  Patient continues to benefit from skilled PT services and is progressing towards goals. Pt generally mobilized at a Modified Independent level during today's session. Pt received semi phillips in bed, on RA, lopez, family present, and agreeable to work with

## 2025-01-25 ENCOUNTER — TELEPHONE (OUTPATIENT)
Age: 75
End: 2025-01-25

## 2025-02-03 ENCOUNTER — PROCEDURE VISIT (OUTPATIENT)
Age: 75
End: 2025-02-03

## 2025-02-03 DIAGNOSIS — G47.33 OSA (OBSTRUCTIVE SLEEP APNEA): Primary | ICD-10-CM

## 2025-02-03 NOTE — PROGRESS NOTES
Patient was seen in the lab today for a mask fit.  Patient is currently using a ResMed F30i Medium full face mask.  Patient was fitted with a ResMed F40 Medium FFM and given a trial in the lab on his device.  No leak present and patient was instructed on care of cleaning procedures of mask.

## 2025-02-04 ENCOUNTER — HOSPITAL ENCOUNTER (OUTPATIENT)
Age: 75
Discharge: HOME | End: 2025-02-07
Payer: MEDICARE

## 2025-02-04 DIAGNOSIS — Z96.651 STATUS POST RIGHT KNEE REPLACEMENT: ICD-10-CM

## 2025-02-04 PROCEDURE — 93971 EXTREMITY STUDY: CPT

## 2025-02-24 ASSESSMENT — SLEEP AND FATIGUE QUESTIONNAIRES
DO YOU HAVE DIFFICULTY VISITING YOUR FAMILY OR FRIENDS IN THEIR HOME BECAUSE YOU BECOME SLEEPY OR TIRED: NO
HOW LIKELY ARE YOU TO NOD OFF OR FALL ASLEEP WHILE WATCHING TV: SLIGHT CHANCE OF DOZING
DO YOU HAVE DIFFICULTY OPERATING A MOTOR VEHICLE FOR SHORT DISTANCES (LESS THAN 100 MILES) BECAUSE YOU BECOME SLEEPY: YES, A LITTLE
HOW LIKELY ARE YOU TO NOD OFF OR FALL ASLEEP WHILE SITTING INACTIVE IN A PUBLIC PLACE: SLIGHT CHANCE OF DOZING
HOW LIKELY ARE YOU TO NOD OFF OR FALL ASLEEP WHILE SITTING QUIETLY AFTER LUNCH WITHOUT ALCOHOL: SLIGHT CHANCE OF DOZING
HAS YOUR RELATIONSHIP WITH FAMILY, FRIENDS OR WORK COLLEAGUES BEEN AFFECTED BECAUSE YOU ARE SLEEPY OR TIRED: YES, A LITTLE
HOW LIKELY ARE YOU TO NOD OFF OR FALL ASLEEP WHILE LYING DOWN TO REST IN THE AFTERNOON WHEN CIRCUMSTANCES PERMIT: MODERATE CHANCE OF DOZING
DO YOU HAVE DIFFICULTY CONCENTRATING ON THE THINGS YOU DO BECAUSE YOU ARE SLEEPY OR TIRED: YES, A LITTLE
HOW LIKELY ARE YOU TO NOD OFF OR FALL ASLEEP IN A CAR, WHILE STOPPED FOR A FEW MINUTES IN TRAFFIC: WOULD NEVER DOZE
DO YOU HAVE DIFFICULTY BEING AS ACTIVE AS YOU WANT TO BE IN THE MORNING BECAUSE YOU ARE SLEEPY OR TIRED: YES, LITTLE
HOW LIKELY ARE YOU TO NOD OFF OR FALL ASLEEP WHILE SITTING AND READING: SLIGHT CHANCE OF DOZING
DO YOU GENERALLY HAVE DIFFICULTY REMEMBERING THINGS BECAUSE YOU ARE SLEEPY OR TIRED: YES, A LITTLE
FOSQ SCORE: 16
HOW LIKELY ARE YOU TO NOD OFF OR FALL ASLEEP WHILE SITTING AND TALKING TO SOMEONE: WOULD NEVER DOZE
ESS TOTAL SCORE: 7
HOW LIKELY ARE YOU TO NOD OFF OR FALL ASLEEP WHEN YOU ARE A PASSENGER IN A CAR FOR AN HOUR WITHOUT A BREAK: SLIGHT CHANCE OF DOZING
HAS YOUR MOOD BEEN AFFECTED BECAUSE YOU ARE SLEEPY OR TIRED: NO
DO YOU HAVE DIFFICULTY WATCHING A MOVIE OR VIDEO BECAUSE YOU BECOME SLEEPY OR TIRED: NO
HOW LIKELY ARE YOU TO NOD OFF OR FALL ASLEEP WHILE SITTING INACTIVE IN A PUBLIC PLACE: SLIGHT CHANCE OF DOZING
HOW LIKELY ARE YOU TO NOD OFF OR FALL ASLEEP WHILE SITTING AND TALKING TO SOMEONE: WOULD NEVER DOZE
DO YOU HAVE DIFFICULTY BEING AS ACTIVE AS YOU WANT TO BE IN THE EVENING BECAUSE YOU ARE SLEEPY OR TIRED: YES, LITTLE
HOW LIKELY ARE YOU TO NOD OFF OR FALL ASLEEP WHILE SITTING QUIETLY AFTER LUNCH WITHOUT ALCOHOL: SLIGHT CHANCE OF DOZING
HOW LIKELY ARE YOU TO NOD OFF OR FALL ASLEEP WHEN YOU ARE A PASSENGER IN A CAR FOR AN HOUR WITHOUT A BREAK: SLIGHT CHANCE OF DOZING
HOW LIKELY ARE YOU TO NOD OFF OR FALL ASLEEP WHILE SITTING AND READING: SLIGHT CHANCE OF DOZING
HOW LIKELY ARE YOU TO NOD OFF OR FALL ASLEEP IN A CAR, WHILE STOPPED FOR A FEW MINUTES IN TRAFFIC: WOULD NEVER DOZE
HOW LIKELY ARE YOU TO NOD OFF OR FALL ASLEEP WHILE LYING DOWN TO REST IN THE AFTERNOON WHEN CIRCUMSTANCES PERMIT: MODERATE CHANCE OF DOZING
HOW LIKELY ARE YOU TO NOD OFF OR FALL ASLEEP WHILE WATCHING TV: SLIGHT CHANCE OF DOZING
DO YOU HAVE DIFFICULTY OPERATING A MOTOR VEHICLE FOR LONG DISTANCES (GREATER THAN 100 MILES) BECAUSE YOU BECOME SLEEPY: YES, MODERATE

## 2025-02-25 ENCOUNTER — OFFICE VISIT (OUTPATIENT)
Age: 75
End: 2025-02-25
Payer: MEDICARE

## 2025-02-25 VITALS
HEART RATE: 62 BPM | HEIGHT: 74 IN | BODY MASS INDEX: 25.35 KG/M2 | OXYGEN SATURATION: 98 % | WEIGHT: 197.5 LBS | TEMPERATURE: 98.2 F | DIASTOLIC BLOOD PRESSURE: 49 MMHG | SYSTOLIC BLOOD PRESSURE: 116 MMHG

## 2025-02-25 DIAGNOSIS — G47.33 OSA (OBSTRUCTIVE SLEEP APNEA): Primary | ICD-10-CM

## 2025-02-25 DIAGNOSIS — I10 PRIMARY HYPERTENSION: ICD-10-CM

## 2025-02-25 PROCEDURE — 1123F ACP DISCUSS/DSCN MKR DOCD: CPT | Performed by: INTERNAL MEDICINE

## 2025-02-25 PROCEDURE — 3074F SYST BP LT 130 MM HG: CPT | Performed by: INTERNAL MEDICINE

## 2025-02-25 PROCEDURE — 3078F DIAST BP <80 MM HG: CPT | Performed by: INTERNAL MEDICINE

## 2025-02-25 PROCEDURE — 3017F COLORECTAL CA SCREEN DOC REV: CPT | Performed by: INTERNAL MEDICINE

## 2025-02-25 PROCEDURE — G8419 CALC BMI OUT NRM PARAM NOF/U: HCPCS | Performed by: INTERNAL MEDICINE

## 2025-02-25 PROCEDURE — 99214 OFFICE O/P EST MOD 30 MIN: CPT | Performed by: INTERNAL MEDICINE

## 2025-02-25 PROCEDURE — 1160F RVW MEDS BY RX/DR IN RCRD: CPT | Performed by: INTERNAL MEDICINE

## 2025-02-25 PROCEDURE — G2211 COMPLEX E/M VISIT ADD ON: HCPCS | Performed by: INTERNAL MEDICINE

## 2025-02-25 PROCEDURE — G8427 DOCREV CUR MEDS BY ELIG CLIN: HCPCS | Performed by: INTERNAL MEDICINE

## 2025-02-25 PROCEDURE — 1159F MED LIST DOCD IN RCRD: CPT | Performed by: INTERNAL MEDICINE

## 2025-02-25 PROCEDURE — 1036F TOBACCO NON-USER: CPT | Performed by: INTERNAL MEDICINE

## 2025-02-25 NOTE — PROGRESS NOTES
[address], [place] [address]  Tel.  [phone]    Fax. [phone]     [address], [place] [address]  Tel.  [phone]    Fax. [phone] [address] [place] [address]  Tel.  [phone]    Fax. [phone]       Andrea Chacon (: 1950) is a 74 y.o. male, established patient, seen for positive airway pressure follow-up and evaluation of the following chief complaint(s):   Sleep Problem (6 month F/U)       Andrea Chacon was last seen by Ayanna Gray on 24, prior notes reviewed in detail.   Initial sleep apnea diagnosis in approx , re-evaluated 2013 with home sleep testing showing Avg AHI of 13/hr, treated with CPAP and BiPAP, changed to Oral appliance due to PAP tolerance issues .  Oral appliance caused dental damage and was stopped.       Repeat diagnostic in lab sleep test 2023 showed AHI of 15.9/hr with a lowest SpO2 of 91%.  Weight at time of sleep testing 199 pounds.  Titration completed 2023 adequate at pressure of 5 cm H2O with N20 nasal mask.     ASSESSMENT/PLAN:   Diagnosis Orders   1. TREVOR (obstructive sleep apnea)  DME Order for (Specify) as OP    SLEEP STUDY FULL      2. Primary hypertension        3. Adult BMI 25.0-25.9 kg/sq m            On ResMed:  AirSense 10 AutoSet    Settings:  Min EPAP:  04 cmH2O  Max EPAP: 7.6 cmH2O    EPR: 2    Sleep Apnea -   * He is adherent with PAP therapy and PAP continues to benefit patient and remains necessary for control of his sleep apnea.     *Hypoglossal Nerve Stimulation Therapy qualifiers, otolaryngology evaluation including Awake Endoscopy (DISE) and brief summary of the implantation process reviewed. Need to continue PAP therapy perioperatively until Hypoglossal Nerve Stimulator Implant Activation discussed.     * Mask evaluation done in the office - see tech notes.    * Supplies for his device were ordered as noted below:    Orders Placed

## 2025-02-25 NOTE — PATIENT INSTRUCTIONS
[address]  [place] [address]  Tel.  [phone]  Fax. [phone] [address]  [place] [address]  Tel.  [phone]  Fax. [phone] [address] [place] [address]  Tel.  [phone]  Fax. [phone]     Sleep Apnea: After Your Visit  Your Care Instructions  Sleep apnea occurs when you frequently stop breathing for 10 seconds or longer during sleep. It can be mild to severe, based on the number of times per hour that you stop breathing or have slowed breathing. Blocked or narrowed airways in your nose, mouth, or throat can cause sleep apnea. Your airway can become blocked when your throat muscles and tongue relax during sleep.  Sleep apnea is common, occurring in 1 out of 20 individuals.  Individuals having any of the following characteristics should be evaluated and treated right away due to high risk and detrimental consequences from untreated sleep apnea:  Obesity  Congestive Heart failure  Atrial Fibrillation  Uncontrolled Hypertension  Type II Diabetes  Night-time Arrhythmias  Stroke  Pulmonary Hypertension  High-risk Driving Populations (pilots, truck drivers, etc.)  Patients Considering Weight-loss Surgery    How do you know you have sleep apnea?  You probably have sleep apnea if you answer 'yes' to 3 or more of the following questions:  S - Have you been told that you Snore?   T - Are you often Tired during the day?  O - Has anyone Observed you stop breathing while sleeping?  P- Do you have (or are being treated for) high blood Pressure?    B - Are you obese (Body Mass Index > 35)?  A - Is your Age 50 years old or older?  N - Is your Neck size greater than 16 inches?  G - Are you male Gender?  A sleep physician can prescribe a breathing device that prevents tissues in the throat from blocking your airway. Or your doctor may recommend using a dental device (oral breathing device) to help keep your airway open. In some cases, surgery may

## 2025-02-26 NOTE — PROGRESS NOTES
Sleep Study Technical Notes   Disclaimer:  all notes have not been confirmed by interpreting physician      PRE-Test:  Rashawn Shane (: 1950) arrived in the lobby. The patient was taken to the Sleep Center and taken directly to his/her room. Procedure explained to the patient and questions were answered. The patient expressed understanding of the procedure. Electrodes were applied without incident. The patient was placed in bed and the study was started. Acquisition Notes:  Lights off: 2211    Respiratory events:   A__YES  H__YES  C__NO  M__NO   Snoring:   mild/moderate   Sleep Stages:  REM   YES  PAP titration information in Sleep Study CPAP Evaluation  Positional therapy with:   Patient slept with positional therapy:  NO  Patient slept with head of bed elevated:  NO  Supine sleep assessed per physician order:  NO    If not, why?? NA  Patient wore an oral appliance:  NO  Other comments:    Patient had caregiver in attendance:  NO  Patient watched TV or on phone after lights out for 0 hours  Patient to bathroom 0 times       POST Test:  Patient was awakened. Electrodes were removed. The patient was discharged after answering the Post Questionnaire. Equipment and room cleaned per infection control policy.
Opt out

## 2025-02-28 ENCOUNTER — CLINICAL DOCUMENTATION (OUTPATIENT)
Age: 75
End: 2025-02-28

## 2025-03-03 ENCOUNTER — TELEPHONE (OUTPATIENT)
Age: 75
End: 2025-03-03

## 2025-03-03 NOTE — TELEPHONE ENCOUNTER
Patient LVM concerning which DME company he is to get his supplies from--[facility] or [employer]

## 2025-03-17 ENCOUNTER — HOSPITAL ENCOUNTER (OUTPATIENT)
Facility: HOSPITAL | Age: 75
Discharge: HOME | End: 2025-03-20
Attending: INTERNAL MEDICINE
Payer: MEDICARE

## 2025-03-17 VITALS
HEART RATE: 60 BPM | OXYGEN SATURATION: 98 % | TEMPERATURE: 97.6 F | DIASTOLIC BLOOD PRESSURE: 49 MMHG | SYSTOLIC BLOOD PRESSURE: 116 MMHG | BODY MASS INDEX: 23.75 KG/M2 | WEIGHT: 191 LBS | HEIGHT: 75 IN

## 2025-03-17 DIAGNOSIS — G47.33 OSA (OBSTRUCTIVE SLEEP APNEA): ICD-10-CM

## 2025-03-17 PROCEDURE — 95810 POLYSOM 6/> YRS 4/> PARAM: CPT | Performed by: INTERNAL MEDICINE

## 2025-03-21 ENCOUNTER — TELEPHONE (OUTPATIENT)
Age: 75
End: 2025-03-21

## 2025-03-21 NOTE — TELEPHONE ENCOUNTER
Called the patient and left a voicemail to reschedule appointment on 07.02 due to provider will be out of the office.

## 2025-03-23 ENCOUNTER — CLINICAL DOCUMENTATION (OUTPATIENT)
Age: 75
End: 2025-03-23

## 2025-03-23 DIAGNOSIS — G47.33 OSA (OBSTRUCTIVE SLEEP APNEA): Primary | ICD-10-CM

## 2025-03-25 ENCOUNTER — TELEPHONE (OUTPATIENT)
Age: 75
End: 2025-03-25

## 2025-03-28 ENCOUNTER — CLINICAL DOCUMENTATION (OUTPATIENT)
Age: 75
End: 2025-03-28

## 2025-03-28 NOTE — PROGRESS NOTES
Referral to ENT faxed to Dr. Ender Kasper. [employer] message sent informing patient that the referral has been faxed. Patient provided with hard-copy of referral via [employer].

## 2025-03-28 NOTE — TELEPHONE ENCOUNTER
Referral to ENT faxed to Dr. Ender Kasper. [employer] message sent informing patient that the referral has been faxed. Patient provided with hard-copy of referral via [employer].

## 2025-04-28 ENCOUNTER — TELEPHONE (OUTPATIENT)
Age: 75
End: 2025-04-28

## 2025-04-30 NOTE — Clinical Note
Andrea Chacon                                                                [address]  [address]  [place] [address]         4/30/25     Dear Mr. Chacon:  MRN:[id number]    This message is regarding a referral that needs to be scheduled. We were unable to reach you by phone; however, your referral is available for review in [facility] under insurance in the drop down menu. We will be making further attempts to contact you to get this scheduled.       Thank you,  [facility]

## 2025-05-13 ENCOUNTER — TELEPHONE (OUTPATIENT)
Age: 75
End: 2025-05-13

## 2025-05-13 NOTE — TELEPHONE ENCOUNTER
Patient would like a call back to let him know why he needs to see neuropsych and if there are any sooner openings.

## 2025-05-20 ENCOUNTER — OFFICE VISIT (OUTPATIENT)
Age: 75
End: 2025-05-20
Payer: MEDICARE

## 2025-05-20 VITALS
DIASTOLIC BLOOD PRESSURE: 66 MMHG | WEIGHT: 193.4 LBS | HEART RATE: 68 BPM | SYSTOLIC BLOOD PRESSURE: 122 MMHG | HEIGHT: 75 IN | OXYGEN SATURATION: 96 % | BODY MASS INDEX: 24.05 KG/M2 | RESPIRATION RATE: 18 BRPM

## 2025-05-20 DIAGNOSIS — H61.23 BILATERAL IMPACTED CERUMEN: ICD-10-CM

## 2025-05-20 DIAGNOSIS — Z78.9 INTOLERANCE OF CONTINUOUS POSITIVE AIRWAY PRESSURE (CPAP) VENTILATION: ICD-10-CM

## 2025-05-20 DIAGNOSIS — G47.33 OSA (OBSTRUCTIVE SLEEP APNEA): Primary | ICD-10-CM

## 2025-05-20 PROCEDURE — 3078F DIAST BP <80 MM HG: CPT | Performed by: OTOLARYNGOLOGY

## 2025-05-20 PROCEDURE — 69210 REMOVE IMPACTED EAR WAX UNI: CPT | Performed by: OTOLARYNGOLOGY

## 2025-05-20 PROCEDURE — 1123F ACP DISCUSS/DSCN MKR DOCD: CPT | Performed by: OTOLARYNGOLOGY

## 2025-05-20 PROCEDURE — 1159F MED LIST DOCD IN RCRD: CPT | Performed by: OTOLARYNGOLOGY

## 2025-05-20 PROCEDURE — G8420 CALC BMI NORM PARAMETERS: HCPCS | Performed by: OTOLARYNGOLOGY

## 2025-05-20 PROCEDURE — G8427 DOCREV CUR MEDS BY ELIG CLIN: HCPCS | Performed by: OTOLARYNGOLOGY

## 2025-05-20 PROCEDURE — 1036F TOBACCO NON-USER: CPT | Performed by: OTOLARYNGOLOGY

## 2025-05-20 PROCEDURE — 3074F SYST BP LT 130 MM HG: CPT | Performed by: OTOLARYNGOLOGY

## 2025-05-20 PROCEDURE — 99204 OFFICE O/P NEW MOD 45 MIN: CPT | Performed by: OTOLARYNGOLOGY

## 2025-05-20 ASSESSMENT — ENCOUNTER SYMPTOMS
VOICE CHANGE: 0
SINUS PAIN: 0
EYE DISCHARGE: 0
SHORTNESS OF BREATH: 0
BACK PAIN: 0
SINUS PRESSURE: 0
NAUSEA: 0
APNEA: 1
EYE ITCHING: 0
VOMITING: 0
ABDOMINAL PAIN: 0
COUGH: 0
TROUBLE SWALLOWING: 0
STRIDOR: 0
RHINORRHEA: 0
CHOKING: 0
SORE THROAT: 0

## 2025-05-20 NOTE — PROGRESS NOTES
Tobacco Use    Smoking status: Passive Smoke Exposure - Never Smoker     Passive exposure: Yes    Smokeless tobacco: Never    Tobacco comments:     quit 1971   Substance Use Topics    Alcohol use: Not Currently     Alcohol/week: 1.0 standard drink of alcohol     Types: 1 Glasses of wine per week      Prior to Admission medications    Medication Sig Start Date End Date Taking? Authorizing Provider   sildenafil (REVATIO) 20 MG tablet TAKE ONE TO THREE TABLETS BY MOUTH ONE HOUR BEFORE RELATIONS **MAX 3 TABLETS DAILY, USE INSTEAD OF CIALIS (TADALAFIL)** 3/6/25  Yes GWENDOLYN Bundy IV, MD   Cyanocobalamin (VITAMIN B 12 PO) Take 500 mcg by mouth daily   Yes Gisela Joshi MD   Ergocalciferol (VITAMIN D2 PO) Take 60 mcg by mouth once a week TAKES ON SUNDAYS   Yes Gisela Joshi MD   sodium chloride (OCEAN, BABY AYR) 0.65 % nasal spray 1 spray by Nasal route as needed for Congestion   Yes Gisela Joshi MD   amLODIPine (NORVASC) 5 MG tablet TAKE 1 TABLET DAILY 12/25/24  Yes GWENDOLYN Bundy IV, MD   atorvastatin (LIPITOR) 10 MG tablet TAKE 1 TABLET DAILY 12/25/24  Yes GWENDOLYN Bundy IV, MD   doxazosin (CARDURA) 1 MG tablet TAKE 1 TABLET AT BEDTIME 10/7/24  Yes GWENDOLYN Bundy IV, MD   colchicine (COLCRYS) 0.6 MG tablet Take 1 tablet by mouth 2 times daily as needed (gout) Use instead of Medrol dose pack 8/30/24  Yes GWENDOLYN Bundy IV, MD   levothyroxine (SYNTHROID) 150 MCG tablet TAKE 1 TABLET DAILY 7/22/24  Yes GWENDOLYN Bundy IV, MD   lithium 150 MG capsule Take 1 capsule by mouth daily   Yes Gisela Joshi MD   carboxymethylcellulose (REFRESH PLUS) 0.5 % SOLN ophthalmic solution Apply 1 drop to eye 2 times daily   Yes Automatic Reconciliation, Ar   diclofenac sodium (VOLTAREN) 1 % GEL Apply topically 4 times daily 11/19/20  Yes Automatic Reconciliation, Ar   hydrocortisone 2.5 % cream Apply 1 g topically 2 times daily as needed   Yes Automatic Reconciliation, Ar   lamoTRIgine

## 2025-05-21 ENCOUNTER — TELEPHONE (OUTPATIENT)
Age: 75
End: 2025-05-21

## 2025-05-27 ENCOUNTER — TELEPHONE (OUTPATIENT)
Age: 75
End: 2025-05-27

## 2025-05-27 NOTE — TELEPHONE ENCOUNTER
PT is returning Von[employer]'s phone to setup Inspire appt with Dr. Kasper @ [place], please call when you return from vacation.

## 2025-06-03 ENCOUNTER — PREP FOR PROCEDURE (OUTPATIENT)
Age: 75
End: 2025-06-03

## 2025-06-03 DIAGNOSIS — Z78.9 INTOLERANCE OF CONTINUOUS POSITIVE AIRWAY PRESSURE (CPAP) VENTILATION: ICD-10-CM

## 2025-06-03 DIAGNOSIS — H61.23 BILATERAL IMPACTED CERUMEN: ICD-10-CM

## 2025-06-03 DIAGNOSIS — G47.33 OSA (OBSTRUCTIVE SLEEP APNEA): ICD-10-CM

## 2025-06-20 NOTE — PERIOP NOTE
[facility]                   [address] [place] [address]   MAIN OR                               [phone]   MAIN PRE OP                       [phone]                                                                                AMBULATORY PRE OP       [phone]  PRE-ADMISSION TESTING [phone]     Surgery Date:  Monday, June 30, 2025          INSTRUCTIONS BEFORE YOUR SURGERY   Arrival [facility] staff will call you between 3 and 7pm the day before your surgery with your arrival time.   (If your surgery is on a Monday, we will call you the Friday before.)    Call [phone] after 7pm Monday-Friday if you did not receive this call.    Free  parking is available from 7:00 AM - 5:00 PM.    Come through the Main Entrance of the hospital.  Take the elevators on the left side of the lobby to the 2nd floor. Proceed to the Admitting Desk to you right for check in.   Have your insurance card, photo ID, and any copayment (if needed).   Food   and   Drink No food or drink (gum, mints, coffee, juice, etc) after midnight the night before surgery.   No alcohol (beer, wine, liquor) or marijuana 24 hours before and after surgery.    You may drink WATER ONLY up until 2 hours prior to your surgery time.   Please do not add anything to your water as this may result in your surgery being postponed.      Pre- operative  Medication Instructions   MEDICATIONS TO TAKE THE MORNING OF SURGERY WITH A SIP OF WATER:     Amlodipine  Atorvastatin  Colchicine, if needed  Lamotrigine  Levothyroxine  Lithium  Lorazepam, if needed  Quetiapine (Seroquel)    DO NOT TAKE Sidenafil for 72 hours before surgery    SPECIAL INSTRUCTIONS:    Tylenol may be taken as needed.   Medications  To  STOP      7 days before surgery Non-Steroidal anti-inflammatory Drugs (NSAID's): for example, Ibuprofen (Advil, Motrin), Naproxen (Aleve).  GLP-1 medications taken for weight loss/ diabetes.   Herbal

## 2025-06-27 ENCOUNTER — ANESTHESIA EVENT (OUTPATIENT)
Facility: HOSPITAL | Age: 75
End: 2025-06-27
Payer: MEDICARE

## 2025-06-27 NOTE — PERIOP NOTE
Hello,     You are scheduled to have surgery tomorrow at [facility].     We would like for you to arrive at 1030 am  We are located on the second floor, [address]. You will check-in at the registration desk located outside the elevators on the second floor prior to proceeding to [address].  Remember nothing to eat or drink after midnight. If you need to take medications the morning of surgery, please take with a few sips of water.   Wear loose, comfortable clothing and leave all your jewelry at home.   You may bring your cell phone with you.  One family member will be allowed in the pre-op area once you are dressed and your IV has been started.   You will need someone to drive you home and be with you for 24 hours post-anesthesia.     We look forward to seeing you! Call [phone] for questions after hours and [phone] between 5:30AM and 6PM.     Thanks!    [facility] ASU PREOP TEAM

## 2025-06-30 ENCOUNTER — HOSPITAL ENCOUNTER (OUTPATIENT)
Facility: HOSPITAL | Age: 75
Setting detail: OUTPATIENT SURGERY
Discharge: HOME | End: 2025-06-30
Attending: OTOLARYNGOLOGY | Admitting: OTOLARYNGOLOGY
Payer: MEDICARE

## 2025-06-30 ENCOUNTER — ANESTHESIA (OUTPATIENT)
Facility: HOSPITAL | Age: 75
End: 2025-06-30
Payer: MEDICARE

## 2025-06-30 VITALS
RESPIRATION RATE: 16 BRPM | HEART RATE: 63 BPM | BODY MASS INDEX: 24.15 KG/M2 | TEMPERATURE: 97.9 F | HEIGHT: 75 IN | DIASTOLIC BLOOD PRESSURE: 60 MMHG | SYSTOLIC BLOOD PRESSURE: 146 MMHG | WEIGHT: 194.22 LBS | OXYGEN SATURATION: 100 %

## 2025-06-30 PROBLEM — H61.23 BILATERAL IMPACTED CERUMEN: Status: RESOLVED | Noted: 2025-06-03 | Resolved: 2025-06-30

## 2025-06-30 PROCEDURE — 7100000000 HC PACU RECOVERY - FIRST 15 MIN: Performed by: OTOLARYNGOLOGY

## 2025-06-30 PROCEDURE — 3600000002 HC SURGERY LEVEL 2 BASE: Performed by: OTOLARYNGOLOGY

## 2025-06-30 PROCEDURE — 7100000011 HC PHASE II RECOVERY - ADDTL 15 MIN: Performed by: OTOLARYNGOLOGY

## 2025-06-30 PROCEDURE — 6360000002 HC RX W HCPCS: Performed by: OTOLARYNGOLOGY

## 2025-06-30 PROCEDURE — 3700000000 HC ANESTHESIA ATTENDED CARE: Performed by: OTOLARYNGOLOGY

## 2025-06-30 PROCEDURE — 7100000010 HC PHASE II RECOVERY - FIRST 15 MIN: Performed by: OTOLARYNGOLOGY

## 2025-06-30 PROCEDURE — 2580000003 HC RX 258: Performed by: ANESTHESIOLOGY

## 2025-06-30 PROCEDURE — 2709999900 HC NON-CHARGEABLE SUPPLY: Performed by: OTOLARYNGOLOGY

## 2025-06-30 PROCEDURE — 6370000000 HC RX 637 (ALT 250 FOR IP): Performed by: OTOLARYNGOLOGY

## 2025-06-30 PROCEDURE — 6360000002 HC RX W HCPCS: Performed by: NURSE ANESTHETIST, CERTIFIED REGISTERED

## 2025-06-30 PROCEDURE — 42975 DISE EVAL SLP DO BRTH FLX DX: CPT | Performed by: OTOLARYNGOLOGY

## 2025-06-30 RX ORDER — LIDOCAINE HYDROCHLORIDE 40 MG/ML
SOLUTION TOPICAL PRN
Status: DISCONTINUED | OUTPATIENT
Start: 2025-06-30 | End: 2025-06-30 | Stop reason: HOSPADM

## 2025-06-30 RX ORDER — ONDANSETRON 2 MG/ML
4 INJECTION INTRAMUSCULAR; INTRAVENOUS
Status: DISCONTINUED | OUTPATIENT
Start: 2025-06-30 | End: 2025-06-30 | Stop reason: HOSPADM

## 2025-06-30 RX ORDER — NALOXONE HYDROCHLORIDE 0.4 MG/ML
INJECTION, SOLUTION INTRAMUSCULAR; INTRAVENOUS; SUBCUTANEOUS PRN
Status: DISCONTINUED | OUTPATIENT
Start: 2025-06-30 | End: 2025-06-30 | Stop reason: HOSPADM

## 2025-06-30 RX ORDER — DIPHENHYDRAMINE HYDROCHLORIDE 50 MG/ML
12.5 INJECTION, SOLUTION INTRAMUSCULAR; INTRAVENOUS
Status: DISCONTINUED | OUTPATIENT
Start: 2025-06-30 | End: 2025-06-30 | Stop reason: HOSPADM

## 2025-06-30 RX ORDER — GLYCOPYRROLATE 0.2 MG/ML
0.2 INJECTION INTRAMUSCULAR; INTRAVENOUS ONCE
Status: COMPLETED | OUTPATIENT
Start: 2025-06-30 | End: 2025-06-30

## 2025-06-30 RX ORDER — MIDAZOLAM HYDROCHLORIDE 2 MG/2ML
2 INJECTION, SOLUTION INTRAMUSCULAR; INTRAVENOUS
Status: DISCONTINUED | OUTPATIENT
Start: 2025-06-30 | End: 2025-06-30 | Stop reason: HOSPADM

## 2025-06-30 RX ORDER — PROPOFOL 10 MG/ML
INJECTION, EMULSION INTRAVENOUS
Status: DISCONTINUED | OUTPATIENT
Start: 2025-06-30 | End: 2025-06-30 | Stop reason: SDUPTHER

## 2025-06-30 RX ORDER — SODIUM CHLORIDE, SODIUM LACTATE, POTASSIUM CHLORIDE, CALCIUM CHLORIDE 600; 310; 30; 20 MG/100ML; MG/100ML; MG/100ML; MG/100ML
INJECTION, SOLUTION INTRAVENOUS CONTINUOUS
Status: DISCONTINUED | OUTPATIENT
Start: 2025-06-30 | End: 2025-06-30 | Stop reason: HOSPADM

## 2025-06-30 RX ORDER — OXYMETAZOLINE HYDROCHLORIDE 0.05 G/100ML
SPRAY NASAL PRN
Status: DISCONTINUED | OUTPATIENT
Start: 2025-06-30 | End: 2025-06-30 | Stop reason: HOSPADM

## 2025-06-30 RX ORDER — LIDOCAINE HYDROCHLORIDE 10 MG/ML
1 INJECTION, SOLUTION EPIDURAL; INFILTRATION; INTRACAUDAL; PERINEURAL
Status: DISCONTINUED | OUTPATIENT
Start: 2025-06-30 | End: 2025-06-30 | Stop reason: HOSPADM

## 2025-06-30 RX ORDER — FENTANYL CITRATE 50 UG/ML
100 INJECTION, SOLUTION INTRAMUSCULAR; INTRAVENOUS
Status: DISCONTINUED | OUTPATIENT
Start: 2025-06-30 | End: 2025-06-30 | Stop reason: HOSPADM

## 2025-06-30 RX ADMIN — SODIUM CHLORIDE, SODIUM LACTATE, POTASSIUM CHLORIDE, AND CALCIUM CHLORIDE: .6; .31; .03; .02 INJECTION, SOLUTION INTRAVENOUS at 10:37

## 2025-06-30 RX ADMIN — PROPOFOL 150 MCG/KG/MIN: 10 INJECTION, EMULSION INTRAVENOUS at 11:16

## 2025-06-30 RX ADMIN — GLYCOPYRROLATE 0.2 MG: 0.2 INJECTION INTRAMUSCULAR; INTRAVENOUS at 10:40

## 2025-06-30 RX ADMIN — PROPOFOL 30 MG: 10 INJECTION, EMULSION INTRAVENOUS at 11:17

## 2025-06-30 ASSESSMENT — ENCOUNTER SYMPTOMS
BACK PAIN: 0
NAUSEA: 0
RHINORRHEA: 0
SINUS PRESSURE: 0
SINUS PAIN: 0
COUGH: 0
SHORTNESS OF BREATH: 0
STRIDOR: 0
EYE ITCHING: 0
CHOKING: 0
VOICE CHANGE: 0
SORE THROAT: 0
ABDOMINAL PAIN: 0
VOMITING: 0
EYE DISCHARGE: 0
APNEA: 1
TROUBLE SWALLOWING: 0

## 2025-06-30 ASSESSMENT — PAIN SCALES - GENERAL
PAINLEVEL_OUTOF10: 0
PAINLEVEL_OUTOF10: 0

## 2025-06-30 ASSESSMENT — PAIN - FUNCTIONAL ASSESSMENT: PAIN_FUNCTIONAL_ASSESSMENT: 0-10

## 2025-06-30 NOTE — ANESTHESIA PRE PROCEDURE
Answered  Tobacco comments: quit 1971      Vital Signs (Current):   Vitals:    06/20/25 1033   Weight: 88.5 kg (195 lb)   Height: 1.905 m (6' 3\")                                              BP Readings from Last 3 Encounters:   06/16/25 (!) 138/52   05/20/25 122/66   03/17/25 (!) 116/49       NPO Status:                                                                                 BMI:   Wt Readings from Last 3 Encounters:   06/20/25 88.5 kg (195 lb)   06/16/25 89.4 kg (197 lb)   05/20/25 87.7 kg (193 lb 6.4 oz)     Body mass index is 24.37 kg/m².    CBC:   Lab Results   Component Value Date/Time    WBC 6.0 06/16/2025 10:27 AM    RBC 4.23 06/16/2025 10:27 AM    HGB 13.0 06/16/2025 10:27 AM    HCT 42.2 06/16/2025 10:27 AM     06/16/2025 10:27 AM    MCV 95.0 08/25/2021 09:19 AM    RDW 11.3 06/16/2025 10:27 AM     06/16/2025 10:27 AM       CMP:   Lab Results   Component Value Date/Time     06/16/2025 10:27 AM    K 4.5 06/16/2025 10:27 AM     06/16/2025 10:27 AM    CO2 19 06/16/2025 10:27 AM    BUN 19 06/16/2025 10:27 AM    CREATININE 1.48 06/16/2025 10:27 AM    GFRAA 67 02/25/2022 10:52 AM    AGRATIO 2.3 06/11/2024 11:56 AM    LABGLOM 49 06/16/2025 10:27 AM    LABGLOM 49 12/20/2023 11:39 AM    GLUCOSE 91 06/16/2025 10:27 AM    CALCIUM 9.8 06/16/2025 10:27 AM    BILITOT 0.5 06/16/2025 10:27 AM    ALKPHOS 71 06/16/2025 10:27 AM    AST 19 06/16/2025 10:27 AM    ALT 28 06/16/2025 10:27 AM       POC Tests: No results for input(s): \"POCGLU\", \"POCNA\", \"POCK\", \"POCCL\", \"POCBUN\", \"POCHEMO\", \"POCHCT\" in the last 72 hours.    Coags:   Lab Results   Component Value Date/Time    PROTIME 10.4 01/08/2025 01:35 PM    INR 1.0 01/08/2025 01:35 PM       HCG (If Applicable): No results found for: \"PREGTESTUR\", \"PREGSERUM\", \"HCG\", \"HCGQUANT\"     ABGs: No results found for: \"PHART\", \"PO2ART\", \"QRX4JBZ\", \"BLW1RMU\", \"BEART\", \"O5IIDGHJ\"     Type & Screen (If Applicable):  Lab Results   Component Value Date    ABORH

## 2025-06-30 NOTE — H&P
[facility]          Outpatient Clinic Note      Subjective:    Andrea Chacon   75 y.o.   1950     H&P    No chief complaint on file.     History of Present Illness:  5/20/25 -  office  76 yo male presents with TREVOR.   Has had issues for a number of years.  Initially had difficulty with CPAP, but more recently with a newer machine he has tolerated better.  There has been issues with mask seal.  Still having some daytime sleepiness.  His wife complains the machine makes noise.  He does feel that his ability to sleep on his side is limited due to CPAP.    There is some snoring with mask on, definite snoring with no mask.  Less snoring on side.    Did have nasal surgery in his teens, for nasal congestion.  No issues with nasal congestion now.  Did have a tonsillectomy as a child also.    6/30/2025  Presents today for DISE evaluation, no interval health changes.    Review of Systems  Review of Systems   Constitutional:  Negative for activity change, appetite change, chills, fatigue and fever.   HENT:  Negative for congestion, ear discharge, ear pain, mouth sores, nosebleeds, postnasal drip, rhinorrhea, sinus pressure, sinus pain, sneezing, sore throat, tinnitus, trouble swallowing and voice change.    Eyes:  Negative for discharge, itching and visual disturbance.   Respiratory:  Positive for apnea. Negative for cough, choking, shortness of breath and stridor.    Cardiovascular:  Negative for chest pain and palpitations.   Gastrointestinal:  Negative for abdominal pain, nausea and vomiting.   Endocrine: Negative for cold intolerance and heat intolerance.   Genitourinary:  Negative for difficulty urinating and dysuria.   Musculoskeletal:  Negative for arthralgias, back pain, gait problem, myalgias, neck pain and neck stiffness.   Skin:  Negative for rash and wound.   Allergic/Immunologic: Positive for environmental allergies. Negative for food allergies.   Neurological:  Negative for

## 2025-06-30 NOTE — DISCHARGE INSTRUCTIONS
Resume home meds and diet. Our office will be in touch.        DISCHARGE SUMMARY from your Nurse      PATIENT INSTRUCTIONS    After general anesthesia or intravenous sedation, for 24 hours or while taking prescription Narcotics:  Limit your activities  Do not drive and operate hazardous machinery  Do not make important personal or business decisions  Do  not drink alcoholic beverages  If you have not urinated within 8 hours after discharge, please contact your surgeon on call.    COUGH AND DEEP BREATHE    Breathing deeply and coughing are very important exercises to do after surgery.  Deep breathing and coughing open the little air tubes and air sacks in your lungs.       You take deep breaths every day.  You may not even notice - it is just something you do when you sigh or yawn.  It is a natural exercise you do to keep these air passages open.        These are general instructions for a healthy lifestyle:    *   Please give a list of your current medications to your Primary Care Provider.  *   Please update this list whenever your medications are discontinued, doses are changed, or new medications (including over-the-counter products) are added.  *   Please carry medication information at all times in case of emergency situations.    Congestive Heart Failure  You may be retaining fluid if you have a history of heart failure or if you experience any of the following symptoms:  Weight gain of 3 pounds or more overnight or 5 pounds in a week, increased swelling in your hands or feet or shortness of breath while lying flat in bed.  Please call your doctor as soon as you notice any of these symptoms; do not wait until your next office visit.      Recognize signs and symptoms of STROKE:  F -  Face looks uneven  A -  Arms unable to move or move evenly  S -  Speech slurred or non-existent  T -  Time-call 911 as soon as signs and symptoms begin-DO NOT go          back to bed or wait to see if you get better-TIME IS

## 2025-06-30 NOTE — PERIOP NOTE
TRANSFER - IN REPORT:    Verbal report received from Susu MENG and Anabel RN on Andrea Chacon  being received from OR for routine post-op      Report consisted of patient's Situation, Background, Assessment and   Recommendations(SBAR).     Information from the following report(s) Nurse Handoff Report, Adult Overview, and Surgery Report was reviewed with the receiving nurse.    Opportunity for questions and clarification was provided.      Assessment completed upon patient's arrival to unit and care assumed.

## 2025-06-30 NOTE — ANESTHESIA POSTPROCEDURE EVALUATION
Department of Anesthesiology  Postprocedure Note    Patient: Andrea Chacon  MRN: [id number]  YOB: 1950  Date of evaluation: 6/30/2025    Procedure Summary       Date: 06/30/25 Room / Location: [facility] ASU OR  / [facility] AMBULATORY OR    Anesthesia Start: 1114 Anesthesia Stop: 1134    Procedure: DRUG INDUCED SLEEP ENDOSCOPY (Mouth) Diagnosis:       TREVOR (obstructive sleep apnea)      Intolerance of continuous positive airway pressure (CPAP) ventilation      Bilateral impacted cerumen      (TREVOR (obstructive sleep apnea) [G47.33])      (Intolerance of continuous positive airway pressure (CPAP) ventilation [Z78.9])      (Bilateral impacted cerumen [H61.23])    Surgeons: Ender Kasper MD Responsible Provider: Andrea Mena MD    Anesthesia Type: MAC ASA Status: 3            Anesthesia Type: No value filed.    Cat Phase I: Cat Score: 10    Cat Phase II: Cat Score: 10    Anesthesia Post Evaluation    Patient location during evaluation: PACU  Patient participation: complete - patient participated  Level of consciousness: awake  Pain score: 0  Airway patency: patent  Nausea & Vomiting: no nausea and no vomiting  Cardiovascular status: blood pressure returned to baseline  Respiratory status: acceptable  Hydration status: euvolemic  Pain management: adequate    No notable events documented.

## 2025-06-30 NOTE — OP NOTE
Operative Note    Patient: Andrea Chacon  YOB: 1950  MRN: [id number]    Date of Procedure: 6/30/25     Pre-Op Diagnosis: TREVOR (obstructive sleep apnea) [G47.33]  Intolerance of continuous positive airway pressure (CPAP) ventilation [Z78.9]  Bilateral impacted cerumen [H61.23]    Post-Op Diagnosis: Same as preoperative diagnosis.      Procedure(s):  DRUG INDUCED SLEEP ENDOSCOPY    Surgeon(s):  Ender Kasper MD    Surgical Assistant: None    Anesthesia: General     Estimated Blood Loss (mL):  Minimal    Complications: None    Specimens: * No specimens in log *     Implants: * No implants in log *    Drains:   [REMOVED] Urinary Catheter 01/16/25 Flynn (Removed)       [REMOVED] Urinary Catheter 01/17/25 Coude (Removed)       [REMOVED] External Urinary Catheter (Removed)       Findings: No evidence of concentric collapse at the velopharynx    Indications: 75-year-old male presents with moderate to severe obstructive sleep apnea, BMI of 24.3 kg/m² and CPAP intolerance.  He is here for a drug-induced sleep endoscopy to assess his airway for candidacy for hypoglossal nerve stimulator implant.    Detailed Description of Procedure:   The patient was brought to the operating room and kept supine on the stretcher.  Sterile drape was applied.  Timeout was performed.  I packed the bilateral nasal cavities with neuro patties soaked in solution of oxymetazoline and 4% topical lidocaine.  The anesthesia team then initiated the propofol infusion.  After a few minutes the patient obtained a nonarousable level of deep sleep along with snoring.  Oxygen therapy was provided either transorally or transnasally using a nasal cannula.     I removed the neuro patties and then utilized the nasopharyngoscope through the nasal cavity.  The scope was advanced at the level of the middle turbinate and posteriorly to the nasopharynx.  Findings are as summarized:      Velum-anteroposterior collapse, no evidence of lateral

## 2025-07-21 PROBLEM — H61.23 BILATERAL IMPACTED CERUMEN: Status: ACTIVE | Noted: 2025-07-21

## 2025-07-24 ENCOUNTER — TELEPHONE (OUTPATIENT)
Facility: HOSPITAL | Age: 75
End: 2025-07-24

## 2025-07-24 NOTE — TELEPHONE ENCOUNTER
Patient called with additional questions about Inspire and his mask.  Inspire questions were addressed as well as his mask concerns.  Patient scheduled for a mask fit on 7/29/25.

## 2025-07-30 ENCOUNTER — PROCEDURE VISIT (OUTPATIENT)
Age: 75
End: 2025-07-30

## 2025-07-30 DIAGNOSIS — G47.33 OSA (OBSTRUCTIVE SLEEP APNEA): Primary | ICD-10-CM

## 2025-07-30 NOTE — PROGRESS NOTES
A mask evaluation was completed for Ines and his current mask the Resmed F40 FFM is leaking. He was fitted with an Marylu FFM, size:M which he will give a try. We spoke about when to clean/replace the mask and tubing. Patient's questions were answered. He did not bring in his device today. A remote 30 Day download was completed and scanned into patient's chart.     Mr. Chacon is scheduled to have inspire surgery in September, however he is apprehensive about having the surgery. He would like to give the new mask a try and let us know what he decides.

## 2025-08-13 ENCOUNTER — TELEPHONE (OUTPATIENT)
Age: 75
End: 2025-08-13

## 2025-08-15 ENCOUNTER — TELEPHONE (OUTPATIENT)
Age: 75
End: 2025-08-15

## (undated) DEVICE — CONTAINER,SPECIMEN,4OZ,OR STRL: Brand: MEDLINE

## (undated) DEVICE — GLOVE ORTHO 8   MSG9480

## (undated) DEVICE — SPONGE GZ W4XL4IN COT 12 PLY TYP VII WVN C FLD DSGN STERILE

## (undated) DEVICE — ENDO CARRY-ON PROCEDURE KIT INCLUDES ENZYMATIC SPONGE, GAUZE, BIOHAZARD LABEL, TRAY, LUBRICANT, DIRTY SCOPE LABEL, WATER LABEL, TRAY, DRAWSTRING PAD, AND DEFENDO 4-PIECE KIT.: Brand: ENDO CARRY-ON PROCEDURE KIT

## (undated) DEVICE — 1200 GUARD II KIT W/5MM TUBE W/O VAC TUBE: Brand: GUARDIAN

## (undated) DEVICE — NEONATAL-ADULT SPO2 SENSOR: Brand: NELLCOR

## (undated) DEVICE — SUTURE VICRYL 1 L27IN ABSRB CT BRAID COAT UD J281H

## (undated) DEVICE — C-ARM: Brand: UNBRANDED

## (undated) DEVICE — NEEDLE HYPO 21GA L1.5IN INTRAMUSCULAR S STL LATCH BVL UP

## (undated) DEVICE — DRAPE EQUIP ROBOT STRL VELYS 20/PK ORDER IN MULTIIPLES OF 20 EACH

## (undated) DEVICE — ZIMMER® STERILE DISPOSABLE TOURNIQUET CUFF WITH PLC, DUAL PORT, SINGLE BLADDER, 34 IN. (86 CM)

## (undated) DEVICE — SYR LR LCK 1ML GRAD NSAF 30ML --

## (undated) DEVICE — SUTURE MCRYL SZ 3-0 L27IN ABSRB UD L24MM PS-1 3/8 CIR PRIM Y936H

## (undated) DEVICE — AIRLIFE™ U/CONNECT-IT OXYGEN TUBING 7 FEET (2.1 M) CRUSH-RESISTANT OXYGEN TUBING, VINYL TIPPED: Brand: AIRLIFE™

## (undated) DEVICE — CONTAINER,SPECIMEN,3OZ,OR STRL: Brand: MEDLINE

## (undated) DEVICE — BAG SPEC BIOHZD LF 2MIL 6X10IN -- CONVERT TO ITEM 357326

## (undated) DEVICE — SUTURE VCRL SZ 2-0 L36IN ABSRB UD L40MM CT 1/2 CIR J957H

## (undated) DEVICE — SUTURE VCRL 1 L27IN ABSRB CT BRAID COAT UD J281H

## (undated) DEVICE — CONNECTOR TBNG AUX H2O JET DISP FOR OLY 160/180 SER

## (undated) DEVICE — SUTURE STRATAFIX SYMMETRIC PDS + SZ 1 L18IN ABSRB VLT L48MM SXPP1A400

## (undated) DEVICE — SYRINGE MED 20ML STD CLR PLAS LUERLOCK TIP N CTRL DISP

## (undated) DEVICE — BLADE SAW W0.49XL3.15IN THK0.047IN CUT THK0.047IN REPL SAG

## (undated) DEVICE — SCRUB DRY SURG EZ SCRUB BRUSH PREOPERATIVE GRN

## (undated) DEVICE — BIPOLAR SEALER 23-112-1 AQM 6.0: Brand: AQUAMANTYS ®

## (undated) DEVICE — QUILTED PREMIUM COMFORT UNDERPAD,EXTRA HEAVY: Brand: WINGS

## (undated) DEVICE — BW-412T DISP COMBO CLEANING BRUSH: Brand: SINGLE USE COMBINATION CLEANING BRUSH

## (undated) DEVICE — STAPLER SKIN H3.9MM WIRE DIA0.58MM CRWN 6.9MM 35 STPL ROT

## (undated) DEVICE — SET ADMIN 16ML TBNG L100IN 2 Y INJ SITE IV PIGGY BK DISP

## (undated) DEVICE — DRAPE,EXTREMITY,89X128,STERILE: Brand: MEDLINE

## (undated) DEVICE — SOLUTION SURG PREP 26 CC PURPREP

## (undated) DEVICE — Device

## (undated) DEVICE — GOWN,SIRUS,NONRNF,SETINSLV,2XL,18/CS: Brand: MEDLINE

## (undated) DEVICE — SOLIDIFIER FLUID 3000 CC ABSORB

## (undated) DEVICE — PADDING CAST SPEC 6INX4YD COT --

## (undated) DEVICE — GLOVE SURG SZ 8 L12IN FNGR THK94MIL STD WHT LTX FREE

## (undated) DEVICE — SYR 20ML LL STRL LF --

## (undated) DEVICE — SPONGE GZ W4XL4IN COT 12 PLY TYP VII WVN C FLD DSGN

## (undated) DEVICE — STERILE POLYISOPRENE POWDER-FREE SURGICAL GLOVES WITH EMOLLIENT COATING: Brand: PROTEXIS

## (undated) DEVICE — SYRINGE 20ML LL S/C 50

## (undated) DEVICE — CONTAINER SPEC 20 ML LID NEUT BUFF FORMALIN 10 % POLYPR STS

## (undated) DEVICE — T4 HOOD

## (undated) DEVICE — STERIS COLD SNARE

## (undated) DEVICE — MARKER,SKIN,WI/RULER AND LABELS: Brand: MEDLINE

## (undated) DEVICE — TUBING SUCT 12FR MAL ALUM SHFT FN CAP VENT UNIV CONN W/ OBT

## (undated) DEVICE — NEEDLE HYPO 18GA L1.5IN PNK S STL HUB POLYPR SHLD REG BVL

## (undated) DEVICE — GLOVE SURG SZ 65 L12IN FNGR THK94MIL STD WHT LTX FREE

## (undated) DEVICE — KENDALL RADIOLUCENT FOAM MONITORING ELECTRODE -RECTANGULAR SHAPE: Brand: KENDALL

## (undated) DEVICE — BOWL MED L 32OZ PLAS W/ MOLD GRAD EZ OPN PEEL PCH

## (undated) DEVICE — TOTAL TRAY, 16FR 10ML SIL FOLEY, URN: Brand: MEDLINE

## (undated) DEVICE — SUTURE MONOCRYL STRATAFIX SPRL + 3 0 SGL ARMED PS 1 24 IN LEN SXMP1B103

## (undated) DEVICE — TRAP SURG QUAD PARABOLA SLOT DSGN SFTY SCRN TRAPEASE

## (undated) DEVICE — 4-PORT MANIFOLD: Brand: NEPTUNE 2

## (undated) DEVICE — SOL IRR SOD CL 0.9% 3000ML --

## (undated) DEVICE — DRAPE EQUIP SATELLITE STN STRL VELYS

## (undated) DEVICE — SOLUTION IRRIG 1000ML H2O PIC PLAS SHATTERPROOF CONTAINER

## (undated) DEVICE — HANDPIECE SET WITH BONE CLEANING TIP AND SUCTION TUBE: Brand: INTERPULSE

## (undated) DEVICE — JELLY,LUBE,STERILE,FLIP TOP,TUBE,4-OZ: Brand: MEDLINE

## (undated) DEVICE — STERILE POLYISOPRENE POWDER-FREE SURGICAL GLOVES: Brand: PROTEXIS

## (undated) DEVICE — DRSG POSTOP PRMSL AG 3.5X14IN

## (undated) DEVICE — SCRUBIN SCRUB BRUSH DRY STER: Brand: MEDLINE INDUSTRIES, INC.

## (undated) DEVICE — CATH IV AUTOGRD BC BLU 22GA 25 -- INSYTE

## (undated) DEVICE — CODMAN® SURGICAL PATTIES 1/2" X 3" (1.27CM X 7.62CM): Brand: CODMAN®

## (undated) DEVICE — FORCEPS BX L240CM JAW DIA2.8MM L CAP W/ NDL MIC MESH TOOTH

## (undated) DEVICE — Z DUP USE 2275493 DRESSING ALGINATE POST OPERATIVE 10X3.5 IN RECT PRIMASEAL

## (undated) DEVICE — KIT IV STRT W CHLORAPREP PD 1ML

## (undated) DEVICE — KIT,ANTI FOG,W/SPONGE & FLUID,SOFT PACK: Brand: MEDLINE

## (undated) DEVICE — TOTAL JOINT - SMH: Brand: MEDLINE INDUSTRIES, INC.

## (undated) DEVICE — TOWEL,OR,DSP,ST,BLUE,STD,4/PK,20PK/CS: Brand: MEDLINE

## (undated) DEVICE — BLADE SAW W11.2XL77.5MM THK0.76MM CUT THK1.17MM REPL RECIP

## (undated) DEVICE — Z DISCONTINUED NO SUB IDED SET EXTN W/ 4 W STPCOCK M SPIN LOK 36IN

## (undated) DEVICE — BAG BELONG PT PERS CLEAR HANDL

## (undated) DEVICE — PIN DRL 4X125 MM ROBOTIC-ASSISTED SOLUTION ARRY VELYS

## (undated) DEVICE — HOOD, PEEL-AWAY: Brand: FLYTE

## (undated) DEVICE — 6619 2 PTNT ISO SYS INCISE AREA&LT;(&GT;&&LT;)&GT;P: Brand: STERI-DRAPE™ IOBAN™ 2

## (undated) DEVICE — Z DISCONTINUED USE 2751540 TUBING IRRIG L10IN DISP PMP ENDOGATOR

## (undated) DEVICE — 3M™ STERI-DRAPE™ U-DRAPE 1015: Brand: STERI-DRAPE™

## (undated) DEVICE — APPLICATOR MEDICATED 26 CC SOLUTION HI LT ORNG CHLORAPREP

## (undated) DEVICE — SOL IRR STRL H2O 1000ML BTL --

## (undated) DEVICE — STRAP,POSITIONING,KNEE/BODY,FOAM,4X60": Brand: MEDLINE

## (undated) DEVICE — DERMABOND SKIN ADH 0.7ML -- DERMABOND ADVANCED 12/BX

## (undated) DEVICE — INFECTION CONTROL KIT SYS

## (undated) DEVICE — GLOVE SURG SZ 65 L12IN FNGR THK79MIL GRN LTX FREE

## (undated) DEVICE — BLADE SAW OSCILLATING 85X19X2 MM ROBOTIC-ASSISTED VELYS

## (undated) DEVICE — SUTURE ABS MF 2-0 CT1 27IN STRATAFIX PDS+ SXPP1B412

## (undated) DEVICE — HYPODERMIC SAFETY NEEDLE: Brand: MAGELLAN

## (undated) DEVICE — SNARE ENDOSCP M L240CM W27MM SHTH DIA2.4MM CHN 2.8MM OVL

## (undated) DEVICE — SOLUTION WND IRRIGATION 450 ML 0.5 PVP-I 0.9 NACL

## (undated) DEVICE — ELECTRODE PT RET AD L9FT HI MOIST COND ADH HYDRGEL CORDED

## (undated) DEVICE — CANN NASAL O2 CAPNOGRAPHY AD -- FILTERLINE

## (undated) DEVICE — PADDING CAST W6INXL4YD NONSTERILE COT RAYON MICROPLEATED

## (undated) DEVICE — 2108 SERIES SAGITTAL BLADE, NO OFFSET  (12.4 X 1.19 X 82.1MM)

## (undated) DEVICE — ATTUNE SOLO PINNING SYSTEM

## (undated) DEVICE — BOWL BNE CEM MIX SPAT CURET SMARTMIX CTS

## (undated) DEVICE — SOLUTION IRRIG 3000ML 0.9% SOD CHL USP UROMATIC PLAS CONT

## (undated) DEVICE — YANKAUER,FLEXIBLE HANDLE,REGLR CAPACITY: Brand: MEDLINE INDUSTRIES, INC.

## (undated) DEVICE — REM POLYHESIVE ADULT PATIENT RETURN ELECTRODE: Brand: VALLEYLAB

## (undated) DEVICE — COVER,MAYO STAND,STERILE: Brand: MEDLINE

## (undated) DEVICE — SUTURE ETHBND EXCEL SZ 2 L30IN NONABSORBABLE GRN L40MM V-37 MX69G

## (undated) DEVICE — BLADE ELECTRODE: Brand: EDGE

## (undated) DEVICE — PREP SKN CHLRAPRP APL 26ML STR --